# Patient Record
Sex: FEMALE | Race: WHITE | NOT HISPANIC OR LATINO | Employment: FULL TIME | URBAN - METROPOLITAN AREA
[De-identification: names, ages, dates, MRNs, and addresses within clinical notes are randomized per-mention and may not be internally consistent; named-entity substitution may affect disease eponyms.]

---

## 2017-03-07 ENCOUNTER — OFFICE VISIT (OUTPATIENT)
Dept: FAMILY MEDICINE | Facility: CLINIC | Age: 27
End: 2017-03-07
Payer: COMMERCIAL

## 2017-03-07 VITALS
HEART RATE: 64 BPM | DIASTOLIC BLOOD PRESSURE: 64 MMHG | TEMPERATURE: 98.6 F | OXYGEN SATURATION: 100 % | SYSTOLIC BLOOD PRESSURE: 118 MMHG | HEIGHT: 64 IN | RESPIRATION RATE: 16 BRPM | WEIGHT: 125 LBS | BODY MASS INDEX: 21.34 KG/M2

## 2017-03-07 DIAGNOSIS — Z00.00 LABORATORY EXAMINATION ORDERED AS PART OF A ROUTINE GENERAL MEDICAL EXAMINATION: ICD-10-CM

## 2017-03-07 DIAGNOSIS — J45.40 MODERATE PERSISTENT ASTHMA WITHOUT COMPLICATION: ICD-10-CM

## 2017-03-07 DIAGNOSIS — Z23 NEED FOR VACCINATION: ICD-10-CM

## 2017-03-07 DIAGNOSIS — Z12.4 SCREENING FOR MALIGNANT NEOPLASM OF CERVIX: ICD-10-CM

## 2017-03-07 DIAGNOSIS — Z00.00 ENCOUNTER FOR ROUTINE ADULT HEALTH EXAMINATION WITHOUT ABNORMAL FINDINGS: Primary | ICD-10-CM

## 2017-03-07 PROBLEM — Z13.6 CARDIOVASCULAR SCREENING; LDL GOAL LESS THAN 160: Status: ACTIVE | Noted: 2017-03-07

## 2017-03-07 PROBLEM — K90.49 DAIRY PRODUCT INTOLERANCE: Status: ACTIVE | Noted: 2017-03-07

## 2017-03-07 PROCEDURE — 99395 PREV VISIT EST AGE 18-39: CPT | Mod: 25 | Performed by: PHYSICIAN ASSISTANT

## 2017-03-07 PROCEDURE — 90715 TDAP VACCINE 7 YRS/> IM: CPT | Performed by: PHYSICIAN ASSISTANT

## 2017-03-07 PROCEDURE — 90471 IMMUNIZATION ADMIN: CPT | Performed by: PHYSICIAN ASSISTANT

## 2017-03-07 RX ORDER — ALBUTEROL SULFATE 90 UG/1
2 AEROSOL, METERED RESPIRATORY (INHALATION) EVERY 6 HOURS PRN
Qty: 2 INHALER | Refills: 5 | Status: SHIPPED | OUTPATIENT
Start: 2017-03-07 | End: 2020-01-09

## 2017-03-07 RX ORDER — ALBUTEROL SULFATE 90 UG/1
2 AEROSOL, METERED RESPIRATORY (INHALATION) EVERY 6 HOURS PRN
Qty: 2 INHALER | Refills: 5 | Status: SHIPPED | OUTPATIENT
Start: 2017-03-07 | End: 2017-03-07

## 2017-03-07 NOTE — PROGRESS NOTES
"Chief Complaint   Patient presents with     Physical       Initial /64  Pulse 64  Temp 98.6  F (37  C)  Resp 16  Ht 5' 4\" (1.626 m)  Wt 125 lb (56.7 kg)  LMP 02/15/2017 (Exact Date)  SpO2 100%  BMI 21.46 kg/m2 Estimated body mass index is 21.46 kg/(m^2) as calculated from the following:    Height as of this encounter: 5' 4\" (1.626 m).    Weight as of this encounter: 125 lb (56.7 kg).  Medication Reconciliation: complete. KEYLA Alfaro LPN       SUBJECTIVE:     CC: Cindi Davies is an 26 year old woman who presents for preventive health visit.     Healthy Habits:    Do you get at least three servings of calcium containing foods daily (dairy, green leafy vegetables, etc.)? yes    Amount of exercise or daily activities, outside of work: 6 day(s) per week    Problems taking medications regularly No    Medication side effects: No    Have you had an eye exam in the past two years? yes    Do you see a dentist twice per year? yes    Do you have sleep apnea, excessive snoring or daytime drowsiness?no    Pap smear done on this date: 8/2016 (approximately), by this group: [facility], results were normal.     Due for tetanus.   Asthma stable - needs refills.   See snap shot for updated social history - will be starting DNP program - needs form signed.     -------------------------------------    Today's PHQ-2 Score:   PHQ-2 ( 1999 [employer]) 3/7/2017 7/11/2014   Q1: Little interest or pleasure in doing things 0 0   Q2: Feeling down, depressed or hopeless 0 0   PHQ-2 Score 0 0       Abuse: Current or Past(Physical, Sexual or Emotional)- No  Do you feel safe in your environment - Yes    Social History   Substance Use Topics     Smoking status: Never Smoker     Smokeless tobacco: Never Used     Alcohol use Yes     The patient does not drink >3 drinks per day nor >7 drinks per week.    No results for input(s): CHOL, HDL, LDL, TRIG, CHOLHDLRATIO, NHDL in the last 08121 hours.    Reviewed orders with patient.  Reviewed health maintenance " and updated orders accordingly - Yes      KEYLA Alfaro LPN      Mammo Decision Support:  Mammogram not appropriate for this patient based on age.    Pertinent mammograms are reviewed under the imaging tab.  History of abnormal Pap smear: NO - age 21-29 PAP every 3 years recommended    Reviewed and updated as needed this visit by clinical staff  Tobacco  Allergies  Meds  Fam Hx  Soc Hx        Reviewed and updated as needed this visit by Provider            ROS:  C: NEGATIVE for fever, chills, change in weight  I: NEGATIVE for worrisome rashes, moles or lesions  E: NEGATIVE for vision changes or irritation  ENT: NEGATIVE for ear, mouth and throat problems  R: NEGATIVE for significant cough or SOB  B: NEGATIVE for masses, tenderness or discharge  CV: NEGATIVE for chest pain, palpitations or peripheral edema  GI: NEGATIVE for nausea, abdominal pain, heartburn, or change in bowel habits  : NEGATIVE for unusual urinary or vaginal symptoms. Periods are regular.  M: NEGATIVE for significant arthralgias or myalgia  N: NEGATIVE for weakness, dizziness or paresthesias  P: NEGATIVE for changes in mood or affect    Problem list, Medication list, Allergies, and Medical/Social/Surgical histories reviewed in [facility] and updated as appropriate.  Labs reviewed in EPIC  Patient Active Problem List   Diagnosis     Moderate persistent asthma without complication     CARDIOVASCULAR SCREENING; LDL GOAL LESS THAN 160     Past Surgical History   Procedure Laterality Date     No history of surgery       Lasik bilateral Bilateral 03/2016     Laura teeth         Social History   Substance Use Topics     Smoking status: Never Smoker     Smokeless tobacco: Never Used     Alcohol use Yes     Family History   Problem Relation Age of Onset     Asthma Mother      Hypothyroidism Mother      Hypertension Father      well-controlled     Colon Polyps Father      Angina Maternal Grandfather      no h/o MI     Alzheimer Disease Paternal Grandmother       "passed from kidney stone leading to sepsis     Colon Cancer Paternal Grandfather 80     did not pass from this     Pacemaker Paternal Grandfather      Heart Failure Paternal Grandfather      Colon Polyps Paternal Aunt      Breast Cancer No family hx of          Current Outpatient Prescriptions   Medication Sig Dispense Refill     albuterol (PROAIR HFA/PROVENTIL HFA/VENTOLIN HFA) 108 (90 BASE) MCG/ACT Inhaler Inhale 2 puffs into the lungs every 6 hours as needed for shortness of breath / dyspnea 2 Inhaler 5     fluticasone-salmeterol (ADVAIR DISKUS) 250-50 MCG/DOSE diskus inhaler Inhale 1 puff into the lungs 2 times daily 60 Inhaler 5     [DISCONTINUED] albuterol (PROAIR HFA/PROVENTIL HFA/VENTOLIN HFA) 108 (90 BASE) MCG/ACT Inhaler Inhale 2 puffs into the lungs every 6 hours as needed for shortness of breath / dyspnea 2 Inhaler 5     [DISCONTINUED] fluticasone-salmeterol (ADVAIR DISKUS) 250-50 MCG/DOSE diskus inhaler Inhale 1 puff into the lungs 2 times daily 60 Inhaler 5     [DISCONTINUED] ADVAIR DISKUS 250-50 MCG/DOSE diskus inhaler        [DISCONTINUED] albuterol (PROAIR HFA, PROVENTIL HFA, VENTOLIN HFA) 108 (90 BASE) MCG/ACT inhaler Inhale 2 puffs into the lungs every 6 hours as needed for shortness of breath / dyspnea 3 Inhaler 1     No Known Allergies  OBJECTIVE:     /64  Pulse 64  Temp 98.6  F (37  C)  Resp 16  Ht 5' 4\" (1.626 m)  Wt 125 lb (56.7 kg)  LMP 02/15/2017 (Exact Date)  SpO2 100%  BMI 21.46 kg/m2  EXAM:  GENERAL: healthy, alert and no distress  EYES: Eyes grossly normal to inspection, PERRL and conjunctivae and sclerae normal  HENT: ear canals and TM's normal, nose and mouth without ulcers or lesions  NECK: no adenopathy, no asymmetry, masses, or scars and thyroid normal to palpation  RESP: lungs clear to auscultation - no rales, rhonchi or wheezes  BREAST: normal without masses, tenderness or nipple discharge and no palpable axillary masses or adenopathy  CV: regular rate and rhythm, normal S1 " "S2, no S3 or S4, no murmur, click or rub, no peripheral edema and peripheral pulses strong  ABDOMEN: soft, nontender, no hepatosplenomegaly, no masses and bowel sounds normal  MS: no gross musculoskeletal defects noted, no edema  SKIN: no suspicious lesions or rashes  NEURO: Normal strength and tone, mentation intact and speech normal  PSYCH: mentation appears normal, affect normal/bright    ASSESSMENT/PLAN:     Cindi was seen today for physical.    Diagnoses and all orders for this visit:    Laboratory examination ordered as part of a routine general medical examination    Screening for malignant neoplasm of cervix  UTD. Will abstract.     Moderate persistent asthma without complication    -     albuterol (PROAIR HFA/PROVENTIL HFA/VENTOLIN HFA) 108 (90 BASE) MCG/ACT Inhaler; Inhale 2 puffs into the lungs every 6 hours as needed for shortness of breath / dyspnea  -     fluticasone-salmeterol (ADVAIR DISKUS) 250-50 MCG/DOSE diskus inhaler; Inhale 1 puff into the lungs 2 times daily    Need for vaccination  -     TDAP (ADACEL AGES 11-64) [19788.002]  -     1st  Administration  [27518]    Other orders  Not fasting today - will update next year.     COUNSELING:   Reviewed preventive health counseling, as reflected in patient instructions  Special attention given to:        Regular exercise       Healthy diet/nutrition       Vision screening       Contraception       Family planning       Safe sex practices/STD prevention         reports that she has never smoked. She has never used smokeless tobacco.    Estimated body mass index is 21.46 kg/(m^2) as calculated from the following:    Height as of this encounter: 5' 4\" (1.626 m).    Weight as of this encounter: 125 lb (56.7 kg).       Counseling Resources:  ATP IV Guidelines  Pooled Cohorts Equation Calculator  Breast Cancer Risk Calculator  FRAX Risk Assessment  ICSI Preventive Guidelines  Dietary Guidelines for Americans, 2010  USDA's MyPlate  ASA Prophylaxis  Lung CA " Screening    WILL MAIL ACT IN 6 months.     Tonya Ernst PA-C  [facility]

## 2017-03-07 NOTE — MR AVS SNAPSHOT
After Visit Summary   3/7/2017    Cindi Davies    MRN: [id number]           Patient Information     Date Of Birth          1990        Visit Information        Provider Department      3/7/2017 9:00 AM Tonya Ernst PA-C [facility]        Today's Diagnoses     Encounter for routine adult health examination without abnormal findings    -  1    Laboratory examination ordered as part of a routine general medical examination        Screening for malignant neoplasm of cervix        Moderate persistent asthma without complication        Need for vaccination          Care Instructions      Preventive Health Recommendations  Female Ages 18 to 25     Yearly exam:     See your health care provider every year in order to  o Review health changes.   o Discuss preventive care.    o Review your medicines if your doctor has prescribed any.      You should be tested each year for STDs (sexually transmitted diseases).       After age 20, talk to your provider about how often you should have cholesterol testing.      Starting at age 21, get a Pap test every three years. If you have an abnormal result, your doctor may have you test more often.      If you are at risk for diabetes, you should have a diabetes test (fasting glucose).     Shots:     Get a flu shot each year.     Get a tetanus shot every 10 years.     Consider getting the shot (vaccine) that prevents cervical cancer (Gardasil).    Nutrition:     Eat at least 5 servings of fruits and vegetables each day.    Eat whole-grain bread, whole-wheat pasta and brown rice instead of white grains and rice.    Talk to your provider about Calcium and Vitamin D.     Lifestyle    Exercise at least 150 minutes a week each week (30 minutes a day, 5 days a week). This will help you control your weight and prevent disease.    Limit alcohol to one drink per day.    No smoking.     Wear sunscreen to prevent skin cancer.    See your dentist  "every six months for an exam and cleaning.        Follow-ups after your visit        Follow-up notes from your care team     Return in about 1 year (around 3/7/2018), or if symptoms worsen or fail to improve, for Physical Exam.      Who to contact     If you have questions or need follow up information about today's clinic visit or your schedule please contact [facility] [place] directly at [phone].  Normal or non-critical lab and imaging results will be communicated to you by [employer], letter or phone within 4 business days after the clinic has received the results. If you do not hear from us within 7 days, please contact the clinic through [employer] or phone. If you have a critical or abnormal lab result, we will notify you by phone as soon as possible.  Submit refill requests through [facility] or call your pharmacy and they will forward the refill request to us. Please allow 3 business days for your refill to be completed.          Additional Information About Your Visit        [employer]ex[employer]ar[employer] Information     [facility] lets you send messages to your doctor, view your test results, renew your prescriptions, schedule appointments and more. To sign up, go to [email or web address] . Click on \"Log in\" on the left side of the screen, which will take you to the Welcome page. Then click on \"Sign up Now\" on the right side of the page.     You will be asked to enter the access code listed below, as well as some personal information. Please follow the directions to create your username and password.     Your access code is: [email or web address]  Expires: 2017 10:34 AM     Your access code will  in 90 days. If you need help or a new code, please call your [place] clinic or [phone].        Care EveryWhere ID     This is your Care EveryWhere ID. This could be used by other organizations to access your [place] medical records  [id number]        Your Vitals Were     Pulse Temperature Respirations Height Last Period " "Pulse Oximetry    64 98.6  F (37  C) 16 5' 4\" (1.626 m) 02/15/2017 (Exact Date) 100%    BMI (Body Mass Index)                   21.46 kg/m2            Blood Pressure from Last 3 Encounters:   03/07/17 118/64   07/10/14 112/62   08/04/13 120/82    Weight from Last 3 Encounters:   03/07/17 125 lb (56.7 kg)   07/10/14 127 lb (57.6 kg)              We Performed the Following     1st  Administration  [33344]     TDAP (ADACEL AGES 11-64) [38227.002]          Today's Medication Changes          These changes are accurate as of: 3/7/17 10:34 AM.  If you have any questions, ask your nurse or doctor.               Start taking these medicines.        Dose/Directions    albuterol 108 (90 BASE) MCG/ACT Inhaler   Commonly known as:  PROAIR HFA/PROVENTIL HFA/VENTOLIN HFA   Used for:  Moderate persistent asthma without complication   Started by:  Tonya Ernst PA-C        Dose:  2 puff   Inhale 2 puffs into the lungs every 6 hours as needed for shortness of breath / dyspnea   Quantity:  2 Inhaler   Refills:  5       fluticasone-salmeterol 250-50 MCG/DOSE diskus inhaler   Commonly known as:  ADVAIR DISKUS   Used for:  Moderate persistent asthma without complication   Started by:  Tonya Ernst PA-C        Dose:  1 puff   Inhale 1 puff into the lungs 2 times daily   Quantity:  60 Inhaler   Refills:  5         Stop taking these medicines if you haven't already. Please contact your care team if you have questions.     ipratropium - albuterol 0.5 mg/2.5 mg/3 mL 0.5-2.5 (3) MG/3ML neb solution   Commonly known as:  DUONEB   Stopped by:  Tonya Ernst PA-C           predniSONE 20 MG tablet   Commonly known as:  DELTASONE   Stopped by:  Tonya Ernst PA-C                Where to get your medicines      These medications were sent to [facility] [place] [place], [address] [place] [address]     Phone:  [phone]     " albuterol 108 (90 BASE) MCG/ACT Inhaler    fluticasone-salmeterol 250-50 MCG/DOSE diskus inhaler                Primary Care Provider Office Phone # Fax #    Tonya Ernst PA-C [phone]       [facility] [place] [address] [place] [address] [address]  [place] [address]        Thank you!     Thank you for choosing [facility]  for your care. Our goal is always to provide you with excellent care. Hearing back from our patients is one way we can continue to improve our services. Please take a few minutes to complete the written survey that you may receive in the mail after your visit with us. Thank you!             Your Updated Medication List - Protect others around you: Learn how to safely use, store and throw away your medicines at www.disposemymeds.org.          This list is accurate as of: 3/7/17 10:34 AM.  Always use your most recent med list.                   Brand Name Dispense Instructions for use    albuterol 108 (90 BASE) MCG/ACT Inhaler    PROAIR HFA/PROVENTIL HFA/VENTOLIN HFA    2 Inhaler    Inhale 2 puffs into the lungs every 6 hours as needed for shortness of breath / dyspnea       fluticasone-salmeterol 250-50 MCG/DOSE diskus inhaler    ADVAIR DISKUS    60 Inhaler    Inhale 1 puff into the lungs 2 times daily

## 2017-03-08 ASSESSMENT — ASTHMA QUESTIONNAIRES: ACT_TOTALSCORE: 22

## 2018-02-25 ENCOUNTER — APPOINTMENT (OUTPATIENT)
Dept: GENERAL RADIOLOGY | Facility: CLINIC | Age: 28
End: 2018-02-25
Attending: EMERGENCY MEDICINE
Payer: COMMERCIAL

## 2018-02-25 ENCOUNTER — NURSE TRIAGE (OUTPATIENT)
Dept: NURSING | Facility: CLINIC | Age: 28
End: 2018-02-25

## 2018-02-25 ENCOUNTER — HOSPITAL ENCOUNTER (EMERGENCY)
Facility: CLINIC | Age: 28
Discharge: HOME | End: 2018-02-25
Attending: EMERGENCY MEDICINE | Admitting: EMERGENCY MEDICINE
Payer: COMMERCIAL

## 2018-02-25 VITALS
RESPIRATION RATE: 16 BRPM | TEMPERATURE: 97.8 F | OXYGEN SATURATION: 100 % | DIASTOLIC BLOOD PRESSURE: 66 MMHG | SYSTOLIC BLOOD PRESSURE: 106 MMHG

## 2018-02-25 DIAGNOSIS — R07.9 ACUTE CHEST PAIN: ICD-10-CM

## 2018-02-25 LAB
ANION GAP SERPL CALCULATED.3IONS-SCNC: 7 MMOL/L (ref 3–14)
BASOPHILS # BLD AUTO: 0 10E9/L (ref 0–0.2)
BASOPHILS NFR BLD AUTO: 0.5 %
BUN SERPL-MCNC: 18 MG/DL (ref 7–30)
CALCIUM SERPL-MCNC: 8.5 MG/DL (ref 8.5–10.1)
CHLORIDE SERPL-SCNC: 108 MMOL/L (ref 94–109)
CO2 SERPL-SCNC: 26 MMOL/L (ref 20–32)
CREAT SERPL-MCNC: 1.02 MG/DL (ref 0.52–1.04)
D DIMER PPP FEU-MCNC: 0.3 UG/ML FEU (ref 0–0.5)
DIFFERENTIAL METHOD BLD: NORMAL
EOSINOPHIL # BLD AUTO: 0.5 10E9/L (ref 0–0.7)
EOSINOPHIL NFR BLD AUTO: 5.9 %
ERYTHROCYTE [DISTWIDTH] IN BLOOD BY AUTOMATED COUNT: 12.3 % (ref 10–15)
GFR SERPL CREATININE-BSD FRML MDRD: 65 ML/MIN/1.7M2
GLUCOSE SERPL-MCNC: 84 MG/DL (ref 70–99)
HCT VFR BLD AUTO: 39 % (ref 35–47)
HGB BLD-MCNC: 13.9 G/DL (ref 11.7–15.7)
IMM GRANULOCYTES # BLD: 0 10E9/L (ref 0–0.4)
IMM GRANULOCYTES NFR BLD: 0.1 %
LYMPHOCYTES # BLD AUTO: 2 10E9/L (ref 0.8–5.3)
LYMPHOCYTES NFR BLD AUTO: 26 %
MCH RBC QN AUTO: 32.7 PG (ref 26.5–33)
MCHC RBC AUTO-ENTMCNC: 35.6 G/DL (ref 31.5–36.5)
MCV RBC AUTO: 92 FL (ref 78–100)
MONOCYTES # BLD AUTO: 0.7 10E9/L (ref 0–1.3)
MONOCYTES NFR BLD AUTO: 8.4 %
NEUTROPHILS # BLD AUTO: 4.6 10E9/L (ref 1.6–8.3)
NEUTROPHILS NFR BLD AUTO: 59.1 %
NRBC # BLD AUTO: 0 10*3/UL
NRBC BLD AUTO-RTO: 0 /100
PLATELET # BLD AUTO: 301 10E9/L (ref 150–450)
POTASSIUM SERPL-SCNC: 3.9 MMOL/L (ref 3.4–5.3)
RBC # BLD AUTO: 4.25 10E12/L (ref 3.8–5.2)
SODIUM SERPL-SCNC: 141 MMOL/L (ref 133–144)
TROPONIN I SERPL-MCNC: <0.015 UG/L (ref 0–0.04)
WBC # BLD AUTO: 7.7 10E9/L (ref 4–11)

## 2018-02-25 PROCEDURE — 93005 ELECTROCARDIOGRAM TRACING: CPT

## 2018-02-25 PROCEDURE — 71046 X-RAY EXAM CHEST 2 VIEWS: CPT

## 2018-02-25 PROCEDURE — 80048 BASIC METABOLIC PNL TOTAL CA: CPT | Performed by: EMERGENCY MEDICINE

## 2018-02-25 PROCEDURE — 85379 FIBRIN DEGRADATION QUANT: CPT | Performed by: EMERGENCY MEDICINE

## 2018-02-25 PROCEDURE — 84484 ASSAY OF TROPONIN QUANT: CPT | Performed by: EMERGENCY MEDICINE

## 2018-02-25 PROCEDURE — 25000128 H RX IP 250 OP 636: Performed by: EMERGENCY MEDICINE

## 2018-02-25 PROCEDURE — 99285 EMERGENCY DEPT VISIT HI MDM: CPT | Mod: 25

## 2018-02-25 PROCEDURE — 85025 COMPLETE CBC W/AUTO DIFF WBC: CPT | Performed by: EMERGENCY MEDICINE

## 2018-02-25 PROCEDURE — 96360 HYDRATION IV INFUSION INIT: CPT

## 2018-02-25 RX ORDER — SODIUM CHLORIDE 9 MG/ML
1000 INJECTION, SOLUTION INTRAVENOUS CONTINUOUS
Status: DISCONTINUED | OUTPATIENT
Start: 2018-02-25 | End: 2018-02-25 | Stop reason: HOSPADM

## 2018-02-25 RX ORDER — KETOROLAC TROMETHAMINE 30 MG/ML
30 INJECTION, SOLUTION INTRAMUSCULAR; INTRAVENOUS ONCE
Status: DISCONTINUED | OUTPATIENT
Start: 2018-02-25 | End: 2018-02-25 | Stop reason: HOSPADM

## 2018-02-25 RX ADMIN — SODIUM CHLORIDE 1000 ML: 9 INJECTION, SOLUTION INTRAVENOUS at 21:02

## 2018-02-25 RX ADMIN — SODIUM CHLORIDE 1000 ML: 9 INJECTION, SOLUTION INTRAVENOUS at 20:27

## 2018-02-25 ASSESSMENT — ENCOUNTER SYMPTOMS: DIZZINESS: 1

## 2018-02-25 NOTE — ED AVS SNAPSHOT
Emergency Department    [address] [place] [address]    Phone:  [phone]    Fax:  [phone]                                       Cindi Padilla   MRN: [id number]    Department:   Emergency Department   Date of Visit:  2/25/2018           After Visit Summary Signature Page     I have received my discharge instructions, and my questions have been answered. I have discussed any challenges I see with this plan with the nurse or doctor.    ..........................................................................................................................................  Patient/Patient Representative Signature      ..........................................................................................................................................  Patient Representative Print Name and Relationship to Patient    ..................................................               ................................................  Date                                            Time    ..........................................................................................................................................  Reviewed by Signature/Title    ...................................................              ..............................................  Date                                                            Time

## 2018-02-25 NOTE — ED AVS SNAPSHOT
Emergency Department    [address] [place] [id number]    Phone:  [phone]    Fax:  [phone]                                       Cindi Padilla   MRN: [id number]    Department:   Emergency Department   Date of Visit:  2/25/2018           Patient Information     Date Of Birth          1990        Your diagnoses for this visit were:     Acute chest pain        You were seen by Robert Shepherd MD.      Follow-up Information     Follow up with Tonya Ernst PA-C.    Specialty:  Physician Assistant        Discharge Instructions           Chest Wall Pain: Costochondritis    The chest pain that you have had today is caused by costochondritis. This condition is caused by an inflammation of the cartilage joining your ribs to your breastbone. It is not caused by heart or lung problems. Your healthcare team has made sure that the chest pain you feel is not from a life threatening cause of chest pain such as heart attack, collapsed lung, blood clot in the lung, tear in the aorta, or esophageal rupture. The inflammation may have been brought on by a blow to the chest, lifting heavy objects, intense exercise, or an illness that made you cough and sneeze a lot. It often occurs during times of emotional stress. It can be painful, but it is not dangerous. It usually goes away in 1 to 2 weeks. But it may happen again. Rarely, a more serious condition may cause symptoms similar to costochondritis. That s why it s important to watch for the warning signs listed below.  Home care  Follow these guidelines when caring for yourself at home:    If you feel that emotional stress is a cause of your condition, try to figure out the sources of that stress. It may not be obvious. Learn ways to deal with the stress in your life. This can include regular exercise, muscle relaxation, meditation, or simply taking time out for yourself.    You may use acetaminophen, ibuprofen, or naproxen to control  pain, unless another pain medicine was prescribed. If you have liver or kidney disease or ever had a stomach ulcer, talk with your healthcare provider before using these medicines.    You can also help ease pain by using a hot, wet compress or heating pad. Use this with or without a medicated skin cream that helps relieves pain.    Do stretching exercise as advised by your provider.    Take any prescribed medicines as directed.  Follow-up care  Follow up with your healthcare provider, or as advised, if you do not start to get better in the next 2 days.  When to seek medical advice  Call your healthcare provider right away if any of these occur:    A change in the type of pain. Call if it feels different, becomes more serious, lasts longer, or spreads into your shoulder, arm, neck, jaw, or back.    Shortness of breath or pain gets worse when you breathe    Weakness, dizziness, or fainting    Cough with dark-colored sputum (phlegm) or blood    Abdominal pain    Dark red or black stools    Fever of 100.4 F (38 C) or higher, or as directed by your healthcare provider  Date Last Reviewed: 12/1/2016 2000-2017 [employer]. [address] [place] [address]. All rights reserved. This information is not intended as a substitute for professional medical care. Always follow your healthcare professional's instructions.      Discharge Instructions  Chest Pain    You have been seen today for chest pain or discomfort.  At this time, your doctor has found no signs that your chest pain is due to a serious or life-threatening condition, (or you have declined more testing and/or admission to the hospital). However, sometimes there is a serious problem that does not show up right away. Your evaluation today may not be complete and you may need further testing and evaluation.     You need to follow-up with your regular doctor within 3 days.    Return to the Emergency Department if:    Your chest pain changes,  gets worse, starts to happen more often, or comes with less activity.    You are short of breath.    You get very weak or tired.    You pass out or faint.    You have any new symptoms, like fever, cough, numb legs, or you cough up blood.    You have anything else that worries you.    Until you follow-up with your regular doctor please do the following:    Take one aspirin daily unless you have an allergy or are told not to by your doctor.    If a stress test appointment has been made, go to the appointment.    If you have questions, contact your regular doctor.    If your doctor today has told you to follow-up with your regular doctor, it is very important that you make an appointment with your clinic and go to the appointment.  If you do not follow-up with your primary doctor, it may result in missing an important development which could result in permanent injury or disability and/or lasting pain.  If there is any problem keeping your appointment, call your doctor or return to the Emergency Department.    If you were given a prescription for medicine here today, be sure to read all of the information (including the package insert) that comes with your prescription.  This will include important information about the medicine, its side effects, and any warnings that you need to know about.  The pharmacist who fills the prescription can provide more information and answer questions you may have about the medicine.  If you have questions or concerns that the pharmacist cannot address, please call or return to the Emergency Department.     Opioid Medication Information    Pain medications are among the most commonly prescribed medicines, so we are including this information for all our patients. If you did not receive pain medication or get a prescription for pain medicine, you can ignore it.     You may have been given a prescription for an opioid (narcotic) pain medicine and/or have received a pain medicine while  here in the Emergency Department. These medicines can make you drowsy or impaired. You must not drive, operate dangerous equipment, or engage in any other dangerous activities while taking these medications. If you drive while taking these medications, you could be arrested for DUI, or driving under the influence. Do not drink any alcohol while you are taking these medications.     Opioid pain medications can cause addiction. If you have a history of chemical dependency of any type, you are at a higher risk of becoming addicted to pain medications.  Only take these prescribed medications to treat your pain when all other options have been tried. Take it for as short a time and as few doses as possible. Store your pain pills in a secure place, as they are frequently stolen and provide a dangerous opportunity for children or visitors in your house to start abusing these powerful medications. We will not replace any lost or stolen medicine.  As soon as your pain is better, you should flush all your remaining medication.     Many prescription pain medications contain Tylenol  (acetaminophen), including Vicodin , Tylenol #3 , Norco , Lortab , and Percocet .  You should not take any extra pills of Tylenol  if you are using these prescription medications or you can get very sick.  Do not ever take more than 3000 mg of acetaminophen in any 24 hour period.    All opioids tend to cause constipation. Drink plenty of water and eat foods that have a lot of fiber, such as fruits, vegetables, prune juice, apple juice and high fiber cereal.  Take a laxative if you don t move your bowels at least every other day. Miralax , Milk of Magnesia, Colace , or Senna  can be used to keep you regular.      Remember that you can always come back to the Emergency Department if you are not able to see your regular doctor in the amount of time listed above, if you get any new symptoms, or if there is anything that worries you.          24 Hour  Appointment Hotline       To make an appointment at any [facility], call [phone] ([phone]). If you don't have a family doctor or clinic, we will help you find one. [facility] are conveniently located to serve the needs of you and your family.             Review of your medicines      Our records show that you are taking the medicines listed below. If these are incorrect, please call your family doctor or clinic.        Dose / Directions Last dose taken    albuterol 108 (90 BASE) MCG/ACT Inhaler   Commonly known as:  PROAIR HFA/PROVENTIL HFA/VENTOLIN HFA   Dose:  2 puff   Quantity:  2 Inhaler        Inhale 2 puffs into the lungs every 6 hours as needed for shortness of breath / dyspnea   Refills:  5        fluticasone-salmeterol 250-50 MCG/DOSE diskus inhaler   Commonly known as:  ADVAIR DISKUS   Dose:  1 puff   Quantity:  60 Inhaler        Inhale 1 puff into the lungs 2 times daily   Refills:  5                Procedures and tests performed during your visit     Basic metabolic panel    CBC with platelets differential    Cardiac Continuous Monitoring    D dimer quantitative    EKG 12-lead, tracing only    Peripheral IV: Standard    Pulse oximetry nursing    Troponin I    XR Chest 2 Views      Orders Needing Specimen Collection     None      Pending Results     Date and Time Order Name Status Description    2/25/2018 1956 EKG 12-lead, tracing only Preliminary             Pending Culture Results     No orders found from 2/23/2018 to 2/26/2018.            Pending Results Instructions     If you had any lab results that were not finalized at the time of your Discharge, you can call the ED Lab Result RN at [phone]. You will be contacted by this team for any positive Lab results or changes in treatment. The nurses are available 7 days a week from 10A to 6:30P.  You can leave a message 24 hours per day and they will return your call.        Test Results From Your Hospital Stay        2/25/2018   8:30 PM      Component Results     Component Value Ref Range & Units Status    WBC 7.7 4.0 - 11.0 10e9/L Final    RBC Count 4.25 3.8 - 5.2 10e12/L Final    Hemoglobin 13.9 11.7 - 15.7 g/dL Final    Hematocrit 39.0 35.0 - 47.0 % Final    MCV 92 78 - 100 fl Final    MCH 32.7 26.5 - 33.0 pg Final    MCHC 35.6 31.5 - 36.5 g/dL Final    RDW 12.3 10.0 - 15.0 % Final    Platelet Count 301 150 - 450 10e9/L Final    Diff Method Automated Method  Final    % Neutrophils 59.1 % Final    % Lymphocytes 26.0 % Final    % Monocytes 8.4 % Final    % Eosinophils 5.9 % Final    % Basophils 0.5 % Final    % Immature Granulocytes 0.1 % Final    Nucleated RBCs 0 0 /100 Final    Absolute Neutrophil 4.6 1.6 - 8.3 10e9/L Final    Absolute Lymphocytes 2.0 0.8 - 5.3 10e9/L Final    Absolute Monocytes 0.7 0.0 - 1.3 10e9/L Final    Absolute Eosinophils 0.5 0.0 - 0.7 10e9/L Final    Absolute Basophils 0.0 0.0 - 0.2 10e9/L Final    Abs Immature Granulocytes 0.0 0 - 0.4 10e9/L Final    Absolute Nucleated RBC 0.0  Final         2/25/2018  8:48 PM      Component Results     Component Value Ref Range & Units Status    Sodium 141 133 - 144 mmol/L Final    Potassium 3.9 3.4 - 5.3 mmol/L Final    Chloride 108 94 - 109 mmol/L Final    Carbon Dioxide 26 20 - 32 mmol/L Final    Anion Gap 7 3 - 14 mmol/L Final    Glucose 84 70 - 99 mg/dL Final    Urea Nitrogen 18 7 - 30 mg/dL Final    Creatinine 1.02 0.52 - 1.04 mg/dL Final    GFR Estimate 65 >60 mL/min/1.7m2 Final    Non  GFR Calc    GFR Estimate If Black 79 >60 mL/min/1.7m2 Final    African American GFR Calc    Calcium 8.5 8.5 - 10.1 mg/dL Final         2/25/2018  8:52 PM      Component Results     Component Value Ref Range & Units Status    Troponin I ES <0.015 0.000 - 0.045 ug/L Final    The 99th percentile for upper reference range is 0.045 ug/L.  Troponin values   in the range of 0.045 - 0.120 ug/L may be associated with risks of adverse   clinical events.           2/25/2018  8:46 PM       Component Results     Component Value Ref Range & Units Status    D Dimer 0.3 0.0 - 0.50 ug/ml FEU Final    This D-dimer assay is intended for use in conjunction with a clinical pretest   probability assessment model to exclude pulmonary embolism (PE) and deep   venous thrombosis (DVT) in outpatients suspected of PE or DVT. The cut-off   value is 0.5 ug/mL FEU.           2/25/2018  8:24 PM      Narrative     XR CHEST 2 VW 2/25/2018 8:18 PM     HISTORY: Chest pain;      COMPARISON: None available        Impression     IMPRESSION:  No acute pulmonary disease.    ROBERT ORNELAS MD                Clinical Quality Measure: Blood Pressure Screening     Your blood pressure was checked while you were in the emergency department today. The last reading we obtained was  BP: 106/66 . Please read the guidelines below about what these numbers mean and what you should do about them.  If your systolic blood pressure (the top number) is less than 120 and your diastolic blood pressure (the bottom number) is less than 80, then your blood pressure is normal. There is nothing more that you need to do about it.  If your systolic blood pressure (the top number) is 120-139 or your diastolic blood pressure (the bottom number) is 80-89, your blood pressure may be higher than it should be. You should have your blood pressure rechecked within a year by a primary care provider.  If your systolic blood pressure (the top number) is 140 or greater or your diastolic blood pressure (the bottom number) is 90 or greater, you may have high blood pressure. High blood pressure is treatable, but if left untreated over time it can put you at risk for heart attack, stroke, or kidney failure. You should have your blood pressure rechecked by a primary care provider within the next 4 weeks.  If your provider in the emergency department today gave you specific instructions to follow-up with your doctor or provider even sooner than that, you should follow  "that instruction and not wait for up to 4 weeks for your follow-up visit.        Thank you for choosing [facility]       Thank you for choosing [facility] for your care. Our goal is always to provide you with excellent care. Hearing back from our patients is one way we can continue to improve our services. Please take a few minutes to complete the written survey that you may receive in the mail after you visit with us. Thank you!        [employer] Information     [employer] lets you send messages to your doctor, view your test results, renew your prescriptions, schedule appointments and more. To sign up, go to [email or web address] . Click on \"Log in\" on the left side of the screen, which will take you to the Welcome page. Then click on \"Sign up Now\" on the right side of the page.     You will be asked to enter the access code listed below, as well as some personal information. Please follow the directions to create your username and password.     Your access code is: [email or web address]  Expires: 2018  9:26 PM     Your access code will  in 90 days. If you need help or a new code, please call your [facility] clinic or [phone].        Care EveryWhere ID     This is your Care EveryWhere ID. This could be used by other organizations to access your [facility] medical records  [id number]        Equal Access to Services     MARILOU FATIMA : David Ramirez, waaxda luqadaha, qaybta kaalmada gustavo, nessa bravo . So [facility] [phone].    ATENCIÓN: Si habla español, tiene a davidson disposición servicios gratuitos de asistencia lingüística. Llame al [phone].    We comply with applicable federal civil rights laws and [place] laws. We do not discriminate on the basis of race, color, national origin, age, disability, sex, sexual orientation, or gender identity.            After Visit Summary       This is your record. Keep this with you and show to your community pharmacist(s) and " doctor(s) at your next visit.

## 2018-02-26 LAB — INTERPRETATION ECG - MUSE: NORMAL

## 2018-02-26 NOTE — DISCHARGE INSTRUCTIONS
Chest Wall Pain: Costochondritis    The chest pain that you have had today is caused by costochondritis. This condition is caused by an inflammation of the cartilage joining your ribs to your breastbone. It is not caused by heart or lung problems. Your healthcare team has made sure that the chest pain you feel is not from a life threatening cause of chest pain such as heart attack, collapsed lung, blood clot in the lung, tear in the aorta, or esophageal rupture. The inflammation may have been brought on by a blow to the chest, lifting heavy objects, intense exercise, or an illness that made you cough and sneeze a lot. It often occurs during times of emotional stress. It can be painful, but it is not dangerous. It usually goes away in 1 to 2 weeks. But it may happen again. Rarely, a more serious condition may cause symptoms similar to costochondritis. That s why it s important to watch for the warning signs listed below.  Home care  Follow these guidelines when caring for yourself at home:    If you feel that emotional stress is a cause of your condition, try to figure out the sources of that stress. It may not be obvious. Learn ways to deal with the stress in your life. This can include regular exercise, muscle relaxation, meditation, or simply taking time out for yourself.    You may use acetaminophen, ibuprofen, or naproxen to control pain, unless another pain medicine was prescribed. If you have liver or kidney disease or ever had a stomach ulcer, talk with your healthcare provider before using these medicines.    You can also help ease pain by using a hot, wet compress or heating pad. Use this with or without a medicated skin cream that helps relieves pain.    Do stretching exercise as advised by your provider.    Take any prescribed medicines as directed.  Follow-up care  Follow up with your healthcare provider, or as advised, if you do not start to get better in the next 2 days.  When to seek medical  advice  Call your healthcare provider right away if any of these occur:    A change in the type of pain. Call if it feels different, becomes more serious, lasts longer, or spreads into your shoulder, arm, neck, jaw, or back.    Shortness of breath or pain gets worse when you breathe    Weakness, dizziness, or fainting    Cough with dark-colored sputum (phlegm) or blood    Abdominal pain    Dark red or black stools    Fever of 100.4 F (38 C) or higher, or as directed by your healthcare provider  Date Last Reviewed: 12/1/2016 2000-2017 [employer]. [address] [place] [address]. All rights reserved. This information is not intended as a substitute for professional medical care. Always follow your healthcare professional's instructions.      Discharge Instructions  Chest Pain    You have been seen today for chest pain or discomfort.  At this time, your doctor has found no signs that your chest pain is due to a serious or life-threatening condition, (or you have declined more testing and/or admission to the hospital). However, sometimes there is a serious problem that does not show up right away. Your evaluation today may not be complete and you may need further testing and evaluation.     You need to follow-up with your regular doctor within 3 days.    Return to the Emergency Department if:    Your chest pain changes, gets worse, starts to happen more often, or comes with less activity.    You are short of breath.    You get very weak or tired.    You pass out or faint.    You have any new symptoms, like fever, cough, numb legs, or you cough up blood.    You have anything else that worries you.    Until you follow-up with your regular doctor please do the following:    Take one aspirin daily unless you have an allergy or are told not to by your doctor.    If a stress test appointment has been made, go to the appointment.    If you have questions, contact your regular doctor.    If your  doctor today has told you to follow-up with your regular doctor, it is very important that you make an appointment with your clinic and go to the appointment.  If you do not follow-up with your primary doctor, it may result in missing an important development which could result in permanent injury or disability and/or lasting pain.  If there is any problem keeping your appointment, call your doctor or return to the Emergency Department.    If you were given a prescription for medicine here today, be sure to read all of the information (including the package insert) that comes with your prescription.  This will include important information about the medicine, its side effects, and any warnings that you need to know about.  The pharmacist who fills the prescription can provide more information and answer questions you may have about the medicine.  If you have questions or concerns that the pharmacist cannot address, please call or return to the Emergency Department.     Opioid Medication Information    Pain medications are among the most commonly prescribed medicines, so we are including this information for all our patients. If you did not receive pain medication or get a prescription for pain medicine, you can ignore it.     You may have been given a prescription for an opioid (narcotic) pain medicine and/or have received a pain medicine while here in the Emergency Department. These medicines can make you drowsy or impaired. You must not drive, operate dangerous equipment, or engage in any other dangerous activities while taking these medications. If you drive while taking these medications, you could be arrested for DUI, or driving under the influence. Do not drink any alcohol while you are taking these medications.     Opioid pain medications can cause addiction. If you have a history of chemical dependency of any type, you are at a higher risk of becoming addicted to pain medications.  Only take these prescribed  medications to treat your pain when all other options have been tried. Take it for as short a time and as few doses as possible. Store your pain pills in a secure place, as they are frequently stolen and provide a dangerous opportunity for children or visitors in your house to start abusing these powerful medications. We will not replace any lost or stolen medicine.  As soon as your pain is better, you should flush all your remaining medication.     Many prescription pain medications contain Tylenol  (acetaminophen), including Vicodin , Tylenol #3 , Norco , Lortab , and Percocet .  You should not take any extra pills of Tylenol  if you are using these prescription medications or you can get very sick.  Do not ever take more than 3000 mg of acetaminophen in any 24 hour period.    All opioids tend to cause constipation. Drink plenty of water and eat foods that have a lot of fiber, such as fruits, vegetables, prune juice, apple juice and high fiber cereal.  Take a laxative if you don t move your bowels at least every other day. Miralax , Milk of Magnesia, Colace , or Senna  can be used to keep you regular.      Remember that you can always come back to the Emergency Department if you are not able to see your regular doctor in the amount of time listed above, if you get any new symptoms, or if there is anything that worries you.

## 2018-02-26 NOTE — TELEPHONE ENCOUNTER
"  Reason for Disposition    Difficulty breathing    Additional Information    Negative: Chest pain    Negative: Rectal bleeding, bloody stool, or tarry-black stool    Negative: [1] Vomiting AND [2] contains red blood or black (\"coffee ground\") material    Negative: Vomiting is main symptom    Negative: Diarrhea is main symptom    Negative: Headache is main symptom    Negative: Patient states that he/she is having an anxiety/panic attack    Negative: Dizziness from low blood sugar (i.e., < 60 mg/dl or 3.5 mmol/l)    Negative: Dizziness is described as a spinning sensation (i.e., vertigo)    Negative: Heat exhaustion suspected    Negative: Severe difficulty breathing (e.g., struggling for each breath, speaks in single words)    Negative: [1] Difficulty breathing or swallowing AND [2] started suddenly after medicine, an allergic food or bee sting    Negative: Shock suspected (e.g., cold/pale/clammy skin, too weak to stand, low BP, rapid pulse)    Negative: Difficult to awaken or acting confused  (e.g., disoriented, slurred speech)    Negative: [1] Weakness (i.e., paralysis, loss of muscle strength) of the face, arm or leg on one side of the body AND [2] sudden onset AND [3] present now    Negative: [1] Numbness (i.e., loss of sensation) of the face, arm or leg on one side of the body AND [2] sudden onset AND [3] present now    Negative: [1] Loss of speech or garbled speech AND [2] sudden onset AND [3] present now    Negative: Overdose (accidental or intentional) of medications    Negative: [1] Fainted > 15 minutes ago AND [2] still feels too weak or dizzy to stand    Negative: Heart beating < 50 beats per minute OR > 140 beats per minute    Negative: Sounds like a life-threatening emergency to the triager    Answer Assessment - Initial Assessment Questions  1. DESCRIPTION: \"Describe your dizziness.\"      Lightheaded.  Pt reports one hour ago she felt a stabbing pain in her chest and felt light it was difficult to " "breath, she reports this is better now she is talking in sentences.  She also then started to feel dizzy and had some tingling in her legs and hands, no numbness.  She is alert and oriented.  2. LIGHTHEADED: \"Do you feel lightheaded?\" (e.g., somewhat faint, woozy, weak upon standing)      Worse when sitting  3. VERTIGO: \"Do you feel like either you or the room is spinning or tilting?\" (i.e. vertigo)      no  4. SEVERITY: \"How bad is it?\"  \"Do you feel like you are going to faint?\" \"Can you stand and walk?\"    - MILD - walking normally    - MODERATE - interferes with normal activities (e.g., work, school)     - SEVERE - unable to stand, requires support to walk, feels like passing out now.       mild  5. ONSET:  \"When did the dizziness begin?\"      One hour ago  6. AGGRAVATING FACTORS: \"Does anything make it worse?\" (e.g., standing, change in head position)        7. HEART RATE: \"Can you tell me your heart rate?\" \"How many beats in 15 seconds?\"  (Note: not all patients can do this)       no  8. CAUSE: \"What do you think is causing the dizziness?\"      unknown  9. RECURRENT SYMPTOM: \"Have you had dizziness before?\" If so, ask: \"When was the last time?\" \"What happened that time?\"      yes  10. OTHER SYMPTOMS: \"Do you have any other symptoms?\" (e.g., fever, chest pain, vomiting, diarrhea, bleeding)        Chest pain that was stabbing  11. PREGNANCY: \"Is there any chance you are pregnant?\" \"When was your last menstrual period?\"        She has period now    Protocols used: DIZZINESS - LIGHTHEADEDNESS-ADULT-AH    "

## 2018-02-26 NOTE — ED PROVIDER NOTES
History     Chief Complaint:  Chest Pain    HPI   Cindi Padilla is a 27 year old female with a history of asthma who presents to the emergency department today for evaluation of chest pain. The patient reports she is very active and runs about 6-7 miles per day. Today, she was down hill skiing earlier today for several hours. When she returned home at 1730, she was playing with her dog when she began to feel a stabbing left sided chest pain which lasted about 30 seconds. She has been feeling intermittent dizziness and a dull discomfort, prompting her to present to the emergency department. She denies chest pain with exertion.     Cardiac/PE/DVT Risk Factors:  Family history of heart complications     Allergies:  No Known Drug Allergies    Medications:    The patient is currently on no regular medications.    Past Medical History:    Asthma    Past Surgical History:    Lasik bilateral    Family History:    Asthma  Hypertension  Heart Failure  Angina    Social History:  The patient was accompanied to the ED by a friend.  Smoking Status: never  Smokeless Tobacco: never  Alcohol Use: yes  Marital Status:  Single      Review of Systems   Cardiovascular: Positive for chest pain.   Neurological: Positive for dizziness.   All other systems reviewed and are negative.    Physical Exam   First Vitals: BP (!) 138/100  Resp 16  SpO2 98%    Physical Exam  GENERAL: well developed, pleasant  HEAD: atraumatic  EYES: pupils reactive, extraocular muscles intact, conjunctivae normal  ENT:  mucus membranes moist  NECK:  trachea midline, normal range of motion  RESPIRATORY: no tachypnea, breath sounds clear to auscultation   CVS: normal S1/S2, no murmurs, intact distal pulses  ABDOMEN: soft, nontender, nondistention  MUSCULOSKELETAL: no deformities  SKIN: warm and dry, no acute rashes or ulceration  NEURO: GCS 15, cranial nerves intact, alert and oriented x3  PSYCH:  Mood/affect normal    Emergency Department Course      ECG:  Indication: Chest Pain  Completed at 2003.  Read at 2006.   Normal sinus rhythm   Incomplete right bundle branch block  Borderline ECG  Rate 80 bpm. SD interval 162. QRS duration 98. QT/QTc 406/468. P-R-T axes 44 80 42.    Imaging:  Radiology findings were communicated with the patient who voiced understanding of the findings.    XR Chest 2 Views  IMPRESSION:  No acute pulmonary disease.  Report per radiology     Laboratory:  Laboratory findings were communicated with the patient who voiced understanding of the findings.  CBC: WBC 7.7, HGB 13.9,   CMP: Creatinine 1.02  Troponin: <0.015  D dimer: 0.3    Interventions:  2027 NS Bolus 1,000mL IV  2102 NS Bolus 1,000 mL IV    Emergency Department Course:  Nursing notes and vitals reviewed.  I performed an exam of the patient as documented above.   IV was inserted and blood was drawn for laboratory testing, results above.  The patient was sent for a XR Chest 2 Views while in the emergency department, results above.     2153 Patient rechecked and updated.     I personally reviewed the laboratory and imaging results with the Patient and answered all related questions prior to discharge.  Findings and plan explained to the Patient. Patient discharged home with instructions regarding supportive care, medications, and reasons to return. The importance of close follow-up was reviewed.     Impression & Plan      Medical Decision Making:  Cindi Padilla is a 27 year old female who presents with chest pain. Differential includes chest wall pain given all the activity she has been doing, pericarditis, less likely ACS, PE pneumothorax and GERD amongst others. Work up is normal. She does not want anything for pain. She is fairly active with running 5-6 miles on a regular basis without exertional symptoms. She has low cardiac risk factors. I do not feel she needs a stress test. Can try course of tylenol of Maalox. I suspect this is more musculoskeletal.  There are no  findings for pericarditis.     Diagnosis:      1. Acute chest pain     Disposition:  discharged to home    Scribe Disclosure:  I, Adolfo Conklin, am serving as a scribe at 7:45 PM on 2/25/2018 to document services personally performed by Robert Shepherd MD based on my observations and the provider's statements to me.     2/25/2018    EMERGENCY DEPARTMENT       Robert Shepherd MD  02/28/18 1164

## 2018-03-05 ENCOUNTER — OFFICE VISIT (OUTPATIENT)
Dept: FAMILY MEDICINE | Facility: CLINIC | Age: 28
End: 2018-03-05
Payer: COMMERCIAL

## 2018-03-05 VITALS
HEIGHT: 64 IN | WEIGHT: 130 LBS | HEART RATE: 65 BPM | SYSTOLIC BLOOD PRESSURE: 98 MMHG | DIASTOLIC BLOOD PRESSURE: 70 MMHG | OXYGEN SATURATION: 98 % | TEMPERATURE: 97.3 F | BODY MASS INDEX: 22.2 KG/M2

## 2018-03-05 DIAGNOSIS — R07.89 ATYPICAL CHEST PAIN: ICD-10-CM

## 2018-03-05 DIAGNOSIS — J45.40 MODERATE PERSISTENT ASTHMA WITHOUT COMPLICATION: ICD-10-CM

## 2018-03-05 DIAGNOSIS — Z00.00 ENCOUNTER FOR ROUTINE ADULT HEALTH EXAMINATION WITHOUT ABNORMAL FINDINGS: Primary | ICD-10-CM

## 2018-03-05 PROCEDURE — 99395 PREV VISIT EST AGE 18-39: CPT | Performed by: PHYSICIAN ASSISTANT

## 2018-03-05 NOTE — LETTER
[facility]          [address] [place] [address]                            [phone]  Fax: [phone]    Cindi Padilla  [address] [address]    [phone]    March 5, 2018      To whom it may concern     Cindi Padilla received her TDAP vaccination on 03/7/2017. .  If you have any other questions or concerns please feel free to contact me at anytime.        Sincerely,      Sherif Bull PA-C

## 2018-03-05 NOTE — MR AVS SNAPSHOT
After Visit Summary   3/5/2018    Cindi Padilla    MRN: [id number]           Patient Information     Date Of Birth          1990        Visit Information        Provider Department      3/5/2018 10:00 AM Sherif Bull PA-C [facility]        Today's Diagnoses     Encounter for routine adult health examination without abnormal findings    -  1    Atypical chest pain        Moderate persistent asthma without complication          Care Instructions      Preventive Health Recommendations  Female Ages 26 - 39  Yearly exam:   See your health care provider every year in order to    Review health changes.     Discuss preventive care.      Review your medicines if you your doctor has prescribed any.    Until age 30: Get a Pap test every three years (more often if you have had an abnormal result).    After age 30: Talk to your doctor about whether you should have a Pap test every 3 years or have a Pap test with HPV screening every 5 years.   You do not need a Pap test if your uterus was removed (hysterectomy) and you have not had cancer.  You should be tested each year for STDs (sexually transmitted diseases), if you're at risk.   Talk to your provider about how often to have your cholesterol checked.  If you are at risk for diabetes, you should have a diabetes test (fasting glucose).  Shots: Get a flu shot each year. Get a tetanus shot every 10 years.   Nutrition:     Eat at least 5 servings of fruits and vegetables each day.    Eat whole-grain bread, whole-wheat pasta and brown rice instead of white grains and rice.    Talk to your provider about Calcium and Vitamin D.     Lifestyle    Exercise at least 150 minutes a week (30 minutes a day, 5 days of the week). This will help you control your weight and prevent disease.    Limit alcohol to one drink per day.    No smoking.     Wear sunscreen to prevent skin cancer.    See your dentist every six months for an exam and  "cleaning.            Follow-ups after your visit        Follow-up notes from your care team     Return in about 1 year (around 3/5/2019) for Physical Exam.      Your next 10 appointments already scheduled     Mar 05, 2018 10:00 AM CST   New Patient with Sherif Bull PA-C   [facility] [place] [place] ([facility] [place] [place])    [address] [place], [place] [address]en [facility] [id number] [phone]              Who to contact     If you have questions or need follow up information about today's clinic visit or your schedule please contact [facility] directly at [phone].  Normal or non-critical lab and imaging results will be communicated to you by MyChart, letter or phone within 4 business days after the clinic has received the results. If you do not hear from us within 7 days, please contact the clinic through [email or web address] or phone. If you have a critical or abnormal lab result, we will notify you by phone as soon as possible.  Submit refill requests through "[employer] or call your pharmacy and they will forward the refill request to us. Please allow 3 business days for your refill to be completed.          Additional Information About Your Visit        MyChart Information     "[employer] lets you send messages to your doctor, view your test results, renew your prescriptions, schedule appointments and more. To sign up, go to [email or web address] . Click on \"Log in\" on the left side of the screen, which will take you to the Welcome page. Then click on \"Sign up Now\" on the right side of the page.     You will be asked to enter the access code listed below, as well as some personal information. Please follow the directions to create your username and password.     Your access code is: [email or web address]  Expires: 2018  9:26 PM     Your access code will  in 90 days. If you need help or a new code, please call your [facility] or [phone].        Care EveryWhere ID     This is " "your Care EveryWhere ID. This could be used by other organizations to access your [facility] medical records  [id number]        Your Vitals Were     Pulse Temperature Height Last Period Pulse Oximetry BMI (Body Mass Index)    65 97.3  F (36.3  C) (Tympanic) 5' 4\" (1.626 m) 02/21/2018 98% 22.31 kg/m2       Blood Pressure from Last 3 Encounters:   03/05/18 98/70   02/25/18 106/66   03/07/17 118/64    Weight from Last 3 Encounters:   03/05/18 130 lb (59 kg)   03/07/17 125 lb (56.7 kg)   07/10/14 127 lb (57.6 kg)              Today, you had the following     No orders found for display       Primary Care Provider Office Phone # Fax #    [facility] [phone]       [address] [facility] [address]        Equal Access to Services     MARILOU FATIMA : Hadii aad ku hadasho Soomaali, waaxda luqadaha, qaybta kaalmada adeegyada, waxay marimarin haychayo bravo . So [facility] [phone].    ATENCIÓN: Si salvadorla español, tiene a davidson disposición servicios gratuitos de asistencia lingüística. Llame al [phone].    We comply with applicable federal civil rights laws and [place] laws. We do not discriminate on the basis of race, color, national origin, age, disability, sex, sexual orientation, or gender identity.            Thank you!     Thank you for choosing [facility]  for your care. Our goal is always to provide you with excellent care. Hearing back from our patients is one way we can continue to improve our services. Please take a few minutes to complete the written survey that you may receive in the mail after your visit with us. Thank you!             Your Updated Medication List - Protect others around you: Learn how to safely use, store and throw away your medicines at www.disposemymeds.org.          This list is accurate as of 3/5/18  9:49 AM.  Always use your most recent med list.                   Brand Name Dispense Instructions for use Diagnosis "    albuterol 108 (90 BASE) MCG/ACT Inhaler    PROAIR HFA/PROVENTIL HFA/VENTOLIN HFA    2 Inhaler    Inhale 2 puffs into the lungs every 6 hours as needed for shortness of breath / dyspnea    Moderate persistent asthma without complication       fluticasone-salmeterol 250-50 MCG/DOSE diskus inhaler    ADVAIR DISKUS    60 Inhaler    Inhale 1 puff into the lungs 2 times daily    Moderate persistent asthma without complication

## 2018-03-05 NOTE — PROGRESS NOTES
SUBJECTIVE:   CC: Cindi Padilla is an 27 year old woman who presents for preventive health visit.     Healthy Habits:    Do you get at least three servings of calcium containing foods daily (dairy, green leafy vegetables, etc.)? yes    Amount of exercise or daily activities, outside of work: 6-7 day(s) per week    Problems taking medications regularly No    Medication side effects: No    Have you had an eye exam in the past two years? yes    Do you see a dentist twice per year? no    Do you have sleep apnea, excessive snoring or daytime drowsiness?no      PROBLEMS TO ADD ON... Was seen in [facility] on 2-25  for chest pain, all tests came back normal: She continues to have intermittent dull ache in the last side of her chest underneath her breasts.  This comes and goes and is not severe or associated with  Dizziness, SOB or nausea.     Today's PHQ-2 Score:   PHQ-2 ( 1999 Pfizer) 3/7/2017 7/11/2014   Q1: Little interest or pleasure in doing things 0 0   Q2: Feeling down, depressed or hopeless 0 0   PHQ-2 Score 0 0       Abuse: Current or Past(Physical, Sexual or Emotional)- NO  Do you feel safe in your environment - YES    Social History   Substance Use Topics     Smoking status: Never Smoker     Smokeless tobacco: Never Used     Alcohol use Yes     If you drink alcohol do you typically have >3 drinks per day or >7 drinks per week? No                     Reviewed orders with patient.  Reviewed health maintenance and updated orders accordingly - Yes  Patient Active Problem List   Diagnosis     Moderate persistent asthma without complication     CARDIOVASCULAR SCREENING; LDL GOAL LESS THAN 160     Dairy product intolerance     Past Surgical History:   Procedure Laterality Date     LASIK BILATERAL Bilateral 03/2016     NO HISTORY OF SURGERY       wisdom teeth         Social History   Substance Use Topics     Smoking status: Never Smoker     Smokeless tobacco: Never Used     Alcohol use Yes     Family History    Problem Relation Age of Onset     Asthma Mother      Hypothyroidism Mother      Hypertension Father      well-controlled     Colon Polyps Father      Angina Maternal Grandfather      no h/o MI     Alzheimer Disease Paternal Grandmother      passed from kidney stone leading to sepsis     Colon Cancer Paternal Grandfather 80     did not pass from this     Pacemaker Paternal Grandfather      Heart Failure Paternal Grandfather      Colon Polyps Paternal Aunt      Breast Cancer No family hx of          Current Outpatient Prescriptions   Medication Sig Dispense Refill     albuterol (PROAIR HFA/PROVENTIL HFA/VENTOLIN HFA) 108 (90 BASE) MCG/ACT Inhaler Inhale 2 puffs into the lungs every 6 hours as needed for shortness of breath / dyspnea 2 Inhaler 5     fluticasone-salmeterol (ADVAIR DISKUS) 250-50 MCG/DOSE diskus inhaler Inhale 1 puff into the lungs 2 times daily 60 Inhaler 5     No Known Allergies  Recent Labs   Lab Test  02/25/18 2007   CR  1.02   GFRESTIMATED  65   GFRESTBLACK  79   POTASSIUM  3.9        Mammogram not appropriate for this patient based on age.    Pertinent mammograms are reviewed under the imaging tab.  History of abnormal Pap smear: NO - age 21-29 PAP every 3 years recommended    Reviewed and updated as needed this visit by clinical staff  Tobacco  Allergies  Meds  Surg Hx  Fam Hx  Soc Hx        Reviewed and updated as needed this visit by Provider  Tobacco  Surg Hx  Fam Hx  Soc Hx           ROS:  C: NEGATIVE for fever, chills, change in weight  I: NEGATIVE for worrisome rashes, moles or lesions  E: NEGATIVE for vision changes or irritation  ENT: NEGATIVE for ear, mouth and throat problems  R: NEGATIVE for significant cough or SOB  B: NEGATIVE for masses, tenderness or discharge  CV: See above  GI: NEGATIVE for nausea, abdominal pain, heartburn, or change in bowel habits  : NEGATIVE for unusual urinary or vaginal symptoms. Periods are regular.  M: NEGATIVE for significant arthralgias or  "myalgia  N: NEGATIVE for weakness, dizziness or paresthesias  P: NEGATIVE for changes in mood or affect    OBJECTIVE:   BP 98/70 (BP Location: Left arm, Patient Position: Chair, Cuff Size: Adult Regular)  Pulse 65  Temp 97.3  F (36.3  C) (Tympanic)  Ht 5' 4\" (1.626 m)  Wt 130 lb (59 kg)  LMP 02/21/2018  SpO2 98%  BMI 22.31 kg/m2  EXAM:  GENERAL: healthy, alert and no distress  EYES: Eyes grossly normal to inspection, PERRL and conjunctivae and sclerae normal  HENT: ear canals and TM's normal, nose and mouth without ulcers or lesions  NECK: no adenopathy, no asymmetry, masses, or scars and thyroid normal to palpation  RESP: lungs clear to auscultation - no rales, rhonchi or wheezes  CV: regular rate and rhythm, normal S1 S2, no S3 or S4, no murmur, click or rub,  ABDOMEN: soft, nontender, no hepatosplenomegaly, no masses and bowel sounds normal  MS: no gross musculoskeletal defects noted, no edema  SKIN: no suspicious lesions or rashes  NEURO: Normal strength and tone, mentation intact and speech normal  PSYCH: mentation appears normal, affect normal/bright    ASSESSMENT/PLAN:   1. Encounter for routine adult health examination without abnormal findings    2. Atypical chest pain  No current pain, ED workup unremarkable.  Advise that patient monitor her symptoms for a pattern and for any worsening.  If persistent or worsening will consider echo/holter    3. Moderate persistent asthma without complication        COUNSELING:   Reviewed preventive health counseling, as reflected in patient instructions       Regular exercise       Healthy diet/nutrition         reports that she has never smoked. She has never used smokeless tobacco.    Estimated body mass index is 22.31 kg/(m^2) as calculated from the following:    Height as of this encounter: 5' 4\" (1.626 m).    Weight as of this encounter: 130 lb (59 kg).       Counseling Resources:  ATP IV Guidelines  Pooled Cohorts Equation Calculator  Breast Cancer Risk " Calculator  FRAX Risk Assessment  ICSI Preventive Guidelines  Dietary Guidelines for Americans, 2010  USDA's MyPlate  ASA Prophylaxis  Lung CA Screening    Sherif Bull PA-C  [facility] DALJIT CONNER

## 2018-03-06 ASSESSMENT — ASTHMA QUESTIONNAIRES: ACT_TOTALSCORE: 23

## 2018-08-06 DIAGNOSIS — J45.40 MODERATE PERSISTENT ASTHMA WITHOUT COMPLICATION: ICD-10-CM

## 2018-08-06 NOTE — TELEPHONE ENCOUNTER
"Requested Prescriptions   Pending Prescriptions Disp Refills     fluticasone-salmeterol (ADVAIR DISKUS) 250-50 MCG/DOSE diskus inhaler  Last Written Prescription Date:  3/7/17  Last Fill Quantity: 60,  # refills: 5   Last office visit: 3/5/2018 with prescribing provider:  Jorgito     Future Office Visit:     60 Inhaler 5     Sig: Inhale 1 puff into the lungs 2 times daily    Inhaled Steroids Protocol Passed    8/6/2018 11:34 AM       Passed - Patient is age 12 or older       Passed - Asthma control assessment score within normal limits in last 6 months    Please review ACT score.   ACT Total Scores 3/7/2017 3/5/2018   ACT TOTAL SCORE (Goal Greater than or Equal to 20) 22 23   In the past 12 months, how many times did you visit the emergency room for your asthma without being admitted to the hospital? 0 0   In the past 12 months, how many times were you hospitalized overnight because of your asthma? 0 0              Passed - Recent (6 mo) or future (30 days) visit within the authorizing provider's specialty    Patient had office visit in the last 6 months or has a visit in the next 30 days with authorizing provider or within the authorizing provider's specialty.  See \"Patient Info\" tab in inbasket, or \"Choose Columns\" in Meds & Orders section of the refill encounter.              "

## 2018-08-06 NOTE — TELEPHONE ENCOUNTER
Medication is being filled for 1 time refill only due to:  Due for asthma check up in September   Alexia Pitts RN- Triage [employer]

## 2018-11-01 ENCOUNTER — TELEPHONE (OUTPATIENT)
Dept: FAMILY MEDICINE | Facility: CLINIC | Age: 28
End: 2018-11-01

## 2018-11-01 DIAGNOSIS — Z28.39 INCOMPLETE IMMUNIZATION STATUS: Primary | ICD-10-CM

## 2018-11-01 NOTE — TELEPHONE ENCOUNTER
Ordered. Notify patient.    Blair Quispe MD  [facility], [place]

## 2018-11-01 NOTE — TELEPHONE ENCOUNTER
Reason for Call: Request for an order or referral:    Order or referral being requested: needs tiders for lab work for Hep B and Varicella -scheduled lab only for next week    Date needed: as soon as possible    Has the patient been seen by the PCP for this problem? NO    Additional comments: na    Phone number Patient can be reached at:  Cell number on file:    Telephone Information:   Mobile [phone]       Best Time:  any    Can we leave a detailed message on this number?  YES    Call taken on 11/1/2018 at 8:32 AM by Janell Watson

## 2018-11-06 ENCOUNTER — TELEPHONE (OUTPATIENT)
Dept: FAMILY MEDICINE | Facility: CLINIC | Age: 28
End: 2018-11-06

## 2018-11-06 DIAGNOSIS — Z28.39 INCOMPLETE IMMUNIZATION STATUS: ICD-10-CM

## 2018-11-06 DIAGNOSIS — J45.40 MODERATE PERSISTENT ASTHMA WITHOUT COMPLICATION: ICD-10-CM

## 2018-11-06 PROCEDURE — 36415 COLL VENOUS BLD VENIPUNCTURE: CPT | Performed by: FAMILY MEDICINE

## 2018-11-06 PROCEDURE — 86706 HEP B SURFACE ANTIBODY: CPT | Performed by: FAMILY MEDICINE

## 2018-11-06 PROCEDURE — 86787 VARICELLA-ZOSTER ANTIBODY: CPT | Performed by: FAMILY MEDICINE

## 2018-11-06 NOTE — TELEPHONE ENCOUNTER
ACT Total Scores 3/7/2017 3/5/2018 11/6/2018   ACT TOTAL SCORE (Goal Greater than or Equal to 20) 22 23 22   In the past 12 months, how many times did you visit the emergency room for your asthma without being admitted to the hospital? 0 0 0   In the past 12 months, how many times were you hospitalized overnight because of your asthma? 0 0 0

## 2018-11-06 NOTE — TELEPHONE ENCOUNTER
"Requested Prescriptions   Pending Prescriptions Disp Refills     ADVAIR DISKUS 250-50 MCG/DOSE diskus inhaler [Pharmacy Med Name: ADVAIR DISKUS 250-50MCG/DOSE AEPB]  Last Written Prescription Date:  8-6-2018  Last Fill Quantity: 60 inhaler,  # refills: 0   Last office visit: 3/5/2018 with prescribing provider:     Future Office Visit:      0     Sig: INHALE ONE PUFF BY MOUTH TWICE A DAY. DUE FOR ASTHMA CHECK UP IN SEPTEMBER    Inhaled Steroids Protocol Failed    11/6/2018  2:35 PM       Failed - Recent (6 mo) or future (30 days) visit within the authorizing provider's specialty    Patient had office visit in the last 6 months or has a visit in the next 30 days with authorizing provider or within the authorizing provider's specialty.  See \"Patient Info\" tab in inbasket, or \"Choose Columns\" in Meds & Orders section of the refill encounter.           Passed - Patient is age 12 or older       Passed - Asthma control assessment score within normal limits in last 6 months    Please review ACT score.             "

## 2018-11-07 LAB
HBV SURFACE AB SERPL IA-ACNC: 3.56 M[IU]/ML
VZV IGG SER QL IA: 3.3 AI (ref 0–0.8)

## 2018-11-07 ASSESSMENT — ASTHMA QUESTIONNAIRES: ACT_TOTALSCORE: 22

## 2018-11-07 NOTE — PROGRESS NOTES
Please call the patient to notify patient that the blood work is shows negative titer for Hep B. She needs vaccination.   Varicella titer is positive.     Blair Quispe MD

## 2018-11-12 ENCOUNTER — OFFICE VISIT (OUTPATIENT)
Dept: NURSING | Facility: CLINIC | Age: 28
End: 2018-11-12
Payer: COMMERCIAL

## 2018-11-12 DIAGNOSIS — Z23 NEED FOR VACCINATION: Primary | ICD-10-CM

## 2018-11-12 PROCEDURE — 99207 ZZC NO CHARGE NURSE ONLY: CPT

## 2018-11-12 PROCEDURE — 90471 IMMUNIZATION ADMIN: CPT

## 2018-11-12 PROCEDURE — 90746 HEPB VACCINE 3 DOSE ADULT IM: CPT

## 2018-11-12 NOTE — MR AVS SNAPSHOT
After Visit Summary   11/12/2018    Cindi Padilla    MRN: [id number]           Patient Information     Date Of Birth          1990        Visit Information        Provider Department      11/12/2018 3:00 PM EC MA/LPN [facility]        Today's Diagnoses     Need for vaccination    -  1       Follow-ups after your visit        Who to contact     If you have questions or need follow up information about today's clinic visit or your schedule please contact [facility] directly at [phone].  Normal or non-critical lab and imaging results will be communicated to you by MyChart, letter or phone within 4 business days after the clinic has received the results. If you do not hear from us within 7 days, please contact the clinic through MyChart or phone. If you have a critical or abnormal lab result, we will notify you by phone as soon as possible.  Submit refill requests through [employer] or call your pharmacy and they will forward the refill request to us. Please allow 3 business days for your refill to be completed.          Additional Information About Your Visit        Care EveryWhere ID     This is your Care EveryWhere ID. This could be used by other organizations to access your [facility] medical records  [id number]         Blood Pressure from Last 3 Encounters:   03/05/18 98/70   02/25/18 106/66   03/07/17 118/64    Weight from Last 3 Encounters:   03/05/18 130 lb (59 kg)   03/07/17 125 lb (56.7 kg)   07/10/14 127 lb (57.6 kg)              We Performed the Following     1st  Administration  [[id number]]     HEPATITIS B VACCINE,  ADULT  [[id number]]        Primary Care Provider Office Phone # Fax #    [facility] [phone] [address] [facility] [address]        Equal Access to Services     MARILOU FATIMA AH: David Ramirez, watanjada andrew, qaybta kaalmada adearely, nessa bravo  ah. So [facility] [phone].    ATENCIÓN: Si habla husam, tiene a davidson disposición servicios gratuitos de asistencia lingüística. Daria al [phone].    We comply with applicable federal civil rights laws and [place] laws. We do not discriminate on the basis of race, color, national origin, age, disability, sex, sexual orientation, or gender identity.            Thank you!     Thank you for choosing [facility] [place]  for your care. Our goal is always to provide you with excellent care. Hearing back from our patients is one way we can continue to improve our services. Please take a few minutes to complete the written survey that you may receive in the mail after your visit with us. Thank you!             Your Updated Medication List - Protect others around you: Learn how to safely use, store and throw away your medicines at www.disposemymeds.org.          This list is accurate as of 11/12/18  3:20 PM.  Always use your most recent med list.                   Brand Name Dispense Instructions for use Diagnosis    ADVAIR DISKUS 250-50 MCG/DOSE diskus inhaler   Generic drug:  fluticasone-salmeterol     60 Inhaler    INHALE ONE PUFF BY MOUTH TWICE A DAY. DUE FOR ASTHMA CHECK UP IN SEPTEMBER    Moderate persistent asthma without complication       albuterol 108 (90 Base) MCG/ACT inhaler    PROAIR HFA/PROVENTIL HFA/VENTOLIN HFA    2 Inhaler    Inhale 2 puffs into the lungs every 6 hours as needed for shortness of breath / dyspnea    Moderate persistent asthma without complication

## 2018-12-11 ENCOUNTER — TELEPHONE (OUTPATIENT)
Dept: FAMILY MEDICINE | Facility: CLINIC | Age: 28
End: 2018-12-11

## 2018-12-11 DIAGNOSIS — Z23 NEED FOR VACCINATION: Primary | ICD-10-CM

## 2018-12-11 NOTE — TELEPHONE ENCOUNTER
Please see message below. Patient is needing another hep b titer after vaccination. Lab pended.   Manda Sanchez RN   [facility]3] - Triage

## 2018-12-11 NOTE — TELEPHONE ENCOUNTER
Reason for Call:  Other call back    Detailed comments: Pt is wanting a titer for hep b for school.   Needs to have another one done. Need orders    Phone Number Patient can be reached at: Home number on file [phone] (home)    Best Time: anytime    Can we leave a detailed message on this number? YES    Call taken on 12/11/2018 at 7:42 AM by Olivia De Jesus

## 2019-01-29 LAB
BLD GP AB SCN SERPL QL: NEGATIVE
HBV SURFACE AG SERPL QL IA: NONREACTIVE
HIV 1+2 AB+HIV1 P24 AG SERPL QL IA: NONREACTIVE
RUBELLA ANTIBODY IGG QUANTITATIVE: NORMAL IU/ML
TREPONEMA ANTIBODIES: NONREACTIVE

## 2019-02-11 ENCOUNTER — TRANSFERRED RECORDS (OUTPATIENT)
Dept: HEALTH INFORMATION MANAGEMENT | Facility: CLINIC | Age: 29
End: 2019-02-11

## 2019-02-11 LAB — PAP SMEAR - HIM PATIENT REPORTED: NEGATIVE

## 2019-03-08 NOTE — PROGRESS NOTES
SUBJECTIVE:   CC: Cindi Padilla is an 28 year old woman who presents for preventive health visit.     Healthy Habits:    Do you get at least three servings of calcium containing foods daily (dairy, green leafy vegetables, etc.)? yes    Amount of exercise or daily activities, outside of work: not much but usually 30-60 min per day    Problems taking medications regularly No    Medication side effects: No    Have you had an eye exam in the past two years? no    Do you see a dentist twice per year? yes    Do you have sleep apnea, excessive snoring or daytime drowsiness?no  Pap smear done on this date: 02/11/2019 (approximately), by this group: [facility] in [place], results were normal.         Cindi is currently 18 weeks pregnant with her first child.  feeling well so far, no complications in her pregnancy thus far.  She is following with Dr. Phoenix at [facility].    She needs another Hep B titer for work. She was vaccinated as a child but does not have records.         Today's PHQ-2 Score:   PHQ-2 ( 1999 Pfizer) 3/11/2019 3/7/2017   Q1: Little interest or pleasure in doing things 0 0   Q2: Feeling down, depressed or hopeless 0 0   PHQ-2 Score 0 0       Abuse: Current or Past(Physical, Sexual or Emotional)- No  Do you feel safe in your environment? Yes    Social History     Tobacco Use     Smoking status: Never Smoker     Smokeless tobacco: Never Used   Substance Use Topics     Alcohol use: Yes     If you drink alcohol do you typically have >3 drinks per day or >7 drinks per week? Not Applicable                     Reviewed orders with patient.  Reviewed health maintenance and updated orders accordingly - Yes  Patient Active Problem List   Diagnosis     Moderate persistent asthma without complication     CARDIOVASCULAR SCREENING; LDL GOAL LESS THAN 160     Dairy product intolerance     Past Surgical History:   Procedure Laterality Date     LASIK BILATERAL Bilateral 03/2016     NO HISTORY OF SURGERY       george  teeth         Social History     Tobacco Use     Smoking status: Never Smoker     Smokeless tobacco: Never Used   Substance Use Topics     Alcohol use: Yes     Family History   Problem Relation Age of Onset     Asthma Mother      Hypothyroidism Mother      Hypertension Father         well-controlled     Colon Polyps Father      Angina Maternal Grandfather         no h/o MI     Alzheimer Disease Paternal Grandmother         passed from kidney stone leading to sepsis     Colon Cancer Paternal Grandfather 80        did not pass from this     Pacemaker Paternal Grandfather      Heart Failure Paternal Grandfather      Colon Polyps Paternal Aunt      Breast Cancer No family hx of          Current Outpatient Medications   Medication Sig Dispense Refill     ADVAIR DISKUS 250-50 MCG/DOSE diskus inhaler INHALE ONE PUFF BY MOUTH TWICE A DAY. DUE FOR ASTHMA CHECK UP IN  Inhaler 1     albuterol (PROAIR HFA/PROVENTIL HFA/VENTOLIN HFA) 108 (90 BASE) MCG/ACT Inhaler Inhale 2 puffs into the lungs every 6 hours as needed for shortness of breath / dyspnea 2 Inhaler 5     Prenatal Multivit-Min-Fe-FA (PRE- PO)        ranitidine (ZANTAC) 150 MG tablet        No Known Allergies  Recent Labs   Lab Test 18   CR 1.02   GFRESTIMATED 65   GFRESTBLACK 79   POTASSIUM 3.9        Mammogram not appropriate for this patient based on age.    Pertinent mammograms are reviewed under the imaging tab.  History of abnormal Pap smear: NO - age 21-30 PAP every 3 years recommended     Reviewed and updated as needed this visit by clinical staff  Tobacco  Allergies  Meds  Med Hx  Surg Hx  Fam Hx  Soc Hx        Reviewed and updated as needed this visit by Provider  Tobacco  Med Hx  Surg Hx  Fam Hx  Soc Hx       Past Medical History:   Diagnosis Date     NO ACTIVE PROBLEMS       Past Surgical History:   Procedure Laterality Date     LASIK BILATERAL Bilateral 2016     NO HISTORY OF SURGERY       wisdom teeth    "    Obstetric History       T0      L0     SAB0   TAB0   Ectopic0   Multiple0   Live Births0       # Outcome Date GA Lbr Aime/2nd Weight Sex Delivery Anes PTL Lv   1 Current                   ROS:  CONSTITUTIONAL: NEGATIVE for fever, chills, change in weight  INTEGUMENTARU/SKIN: NEGATIVE for worrisome rashes, moles or lesions  EYES: NEGATIVE for vision changes or irritation  ENT: NEGATIVE for ear, mouth and throat problems  RESP: NEGATIVE for significant cough or SOB  BREAST: NEGATIVE for masses, tenderness or discharge  CV: NEGATIVE for chest pain, palpitations or peripheral edema  GI: NEGATIVE for nausea, abdominal pain, heartburn, or change in bowel habits  : NEGATIVE for unusual urinary or vaginal symptoms. Periods are absent due to pregnancy  MUSCULOSKELETAL: NEGATIVE for significant arthralgias or myalgia  NEURO: NEGATIVE for weakness, dizziness or paresthesias  PSYCHIATRIC: NEGATIVE for changes in mood or affect    OBJECTIVE:   /64   Pulse 98   Temp 96.8  F (36  C) (Tympanic)   Ht 1.676 m (5' 6\")   Wt 66.2 kg (146 lb)   SpO2 100%   BMI 23.57 kg/m    EXAM:  GENERAL: healthy, alert and no distress  EYES: Eyes grossly normal to inspection, PERRL and conjunctivae and sclerae normal  HENT: ear canals and TM's normal, nose and mouth without ulcers or lesions  NECK: no adenopathy, no asymmetry, masses, or scars and thyroid normal to palpation  RESP: lungs clear to auscultation - no rales, rhonchi or wheezes  CV: regular rate and rhythm, normal S1 S2, no S3 or S4, no murmur, click or rub, no peripheral edema   ABDOMEN: soft, nontender, no hepatosplenomegaly, no masses and bowel sounds normal  MS: right ankle and foot edema, associated mild TTP of medial foot and plantar right heal, FROM of toes and ankle  SKIN: no suspicious lesions or rashes  NEURO: Normal strength and tone, mentation intact and speech normal  PSYCH: mentation appears normal, affect normal/bright    Diagnostic Test " "Results:  none     ASSESSMENT/PLAN:   1. Encounter for routine adult health examination without abnormal findings  - Hepatitis B Surface Antibody    2. Pregnant state, incidental  Following with OBGYN    3. Tendonitis  Right foot with edema and TTP along plantar fascia and medial aspect of foot.  Etiology likely tendonitis vs. Plantar fasciitis. Patient is compressing with ace, icing and elevating when able which is helping.  Dicussed different exercises for plantar fascitis.  Her BP is normal, symptoms present prior to pregnancy. Will continue to monitor.  May consider advanced imaging if persistent.       COUNSELING:   Reviewed preventive health counseling, as reflected in patient instructions       Regular exercise       Healthy diet/nutrition    BP Readings from Last 1 Encounters:   03/11/19 118/64     Estimated body mass index is 23.57 kg/m  as calculated from the following:    Height as of this encounter: 1.676 m (5' 6\").    Weight as of this encounter: 66.2 kg (146 lb).           reports that  has never smoked. she has never used smokeless tobacco.      Counseling Resources:  ATP IV Guidelines  Pooled Cohorts Equation Calculator  Breast Cancer Risk Calculator  FRAX Risk Assessment  ICSI Preventive Guidelines  Dietary Guidelines for Americans, 2010  USDA's MyPlate  ASA Prophylaxis  Lung CA Screening    Sherif Bull PA-C  [facility] [place]  "

## 2019-03-11 ENCOUNTER — OFFICE VISIT (OUTPATIENT)
Dept: FAMILY MEDICINE | Facility: CLINIC | Age: 29
End: 2019-03-11
Payer: COMMERCIAL

## 2019-03-11 VITALS
WEIGHT: 146 LBS | TEMPERATURE: 96.8 F | HEIGHT: 66 IN | DIASTOLIC BLOOD PRESSURE: 64 MMHG | SYSTOLIC BLOOD PRESSURE: 118 MMHG | BODY MASS INDEX: 23.46 KG/M2 | HEART RATE: 98 BPM | OXYGEN SATURATION: 100 %

## 2019-03-11 DIAGNOSIS — Z00.00 ENCOUNTER FOR ROUTINE ADULT HEALTH EXAMINATION WITHOUT ABNORMAL FINDINGS: Primary | ICD-10-CM

## 2019-03-11 DIAGNOSIS — M77.9 TENDONITIS: ICD-10-CM

## 2019-03-11 DIAGNOSIS — Z33.1 PREGNANT STATE, INCIDENTAL: ICD-10-CM

## 2019-03-11 LAB — HBV SURFACE AB SERPL IA-ACNC: 23.53 M[IU]/ML

## 2019-03-11 PROCEDURE — 99395 PREV VISIT EST AGE 18-39: CPT | Performed by: PHYSICIAN ASSISTANT

## 2019-03-11 PROCEDURE — 36415 COLL VENOUS BLD VENIPUNCTURE: CPT | Performed by: PHYSICIAN ASSISTANT

## 2019-03-11 PROCEDURE — 86706 HEP B SURFACE ANTIBODY: CPT | Performed by: PHYSICIAN ASSISTANT

## 2019-03-11 ASSESSMENT — MIFFLIN-ST. JEOR: SCORE: 1414

## 2019-03-11 NOTE — Clinical Note
Pap smear done on this date: 02/11/2019 (approximately), by this group: [facility] in [place], results were normal.

## 2019-03-12 ENCOUNTER — TELEPHONE (OUTPATIENT)
Dept: FAMILY MEDICINE | Facility: CLINIC | Age: 29
End: 2019-03-12

## 2019-03-12 ASSESSMENT — ASTHMA QUESTIONNAIRES: ACT_TOTALSCORE: 24

## 2019-03-12 NOTE — TELEPHONE ENCOUNTER
Please call Cindi and inform her that her Hep B titer is positive indicating immunity to Hep B.  I have printed a letter for her job stating this which is in by box for

## 2019-03-13 LAB
C TRACH DNA SPEC QL PROBE+SIG AMP: NEGATIVE
N GONORRHOEA DNA SPEC QL PROBE+SIG AMP: NEGATIVE

## 2019-03-13 NOTE — TELEPHONE ENCOUNTER
TC has letter in in-basket.      .Marga VELARDE    [facility]

## 2019-03-13 NOTE — TELEPHONE ENCOUNTER
Non detailed message left for pt to return call to clinic and ask to speak with a triage nurse.    Tere RAMIRES RN  EP Triage

## 2019-03-14 NOTE — TELEPHONE ENCOUNTER
Left a non detailed message for patient to return call.     Arabella TRAVISN, RN   [facility]

## 2019-03-15 NOTE — TELEPHONE ENCOUNTER
Attempt #3. Left a non detailed message for patient to return call.     Routing to team 3 to send patient a letter    Arabella COOL, RN   [facility]

## 2019-03-15 NOTE — TELEPHONE ENCOUNTER
Letter has been mailed to patient.      .Marga VELARDE    [facility]

## 2019-06-20 DIAGNOSIS — J45.40 MODERATE PERSISTENT ASTHMA WITHOUT COMPLICATION: ICD-10-CM

## 2019-06-20 NOTE — TELEPHONE ENCOUNTER
"Requested Prescriptions   Pending Prescriptions Disp Refills     ADVAIR DISKUS 250-50 MCG/DOSE inhaler [Pharmacy Med Name: ADVAIR  Last Written Prescription Date:  11-6-2018  Last Fill Quantity: 60 inhaler,  # refills: 1   Last office visit: 3/11/2019 with prescribing provider:     Future Office Visit:     DISKUS 250-50MCG/DOSE AEPB]  1     Sig: INHALE ONE PUFF BY MOUTH TWICE A DAY       Inhaled Steroids Protocol Passed - 6/20/2019 12:43 PM        Passed - Patient is age 12 or older        Passed - Asthma control assessment score within normal limits in last 6 months     Please review ACT score.           Passed - Medication is active on med list        Passed - Recent (6 mo) or future (30 days) visit within the authorizing provider's specialty     Patient had office visit in the last 6 months or has a visit in the next 30 days with authorizing provider or within the authorizing provider's specialty.  See \"Patient Info\" tab in inbasket, or \"Choose Columns\" in Meds & Orders section of the refill encounter.              "

## 2019-06-20 NOTE — TELEPHONE ENCOUNTER
ACT Total Scores 3/5/2018 11/6/2018 3/11/2019   ACT TOTAL SCORE (Goal Greater than or Equal to 20) 23 22 24   In the past 12 months, how many times did you visit the emergency room for your asthma without being admitted to the hospital? 0 0 0   In the past 12 months, how many times were you hospitalized overnight because of your asthma? 0 0 0

## 2019-07-18 LAB — GROUP B STREP PCR: NEGATIVE

## 2019-08-25 ENCOUNTER — HOSPITAL ENCOUNTER (INPATIENT)
Facility: CLINIC | Age: 29
LOS: 5 days | Discharge: HOME | End: 2019-08-30
Attending: OBSTETRICS & GYNECOLOGY | Admitting: OBSTETRICS & GYNECOLOGY
Payer: COMMERCIAL

## 2019-08-25 LAB
ABO + RH BLD: NORMAL
ABO + RH BLD: NORMAL
SPECIMEN EXP DATE BLD: NORMAL

## 2019-08-25 PROCEDURE — 86780 TREPONEMA PALLIDUM: CPT | Performed by: OBSTETRICS & GYNECOLOGY

## 2019-08-25 PROCEDURE — 36415 COLL VENOUS BLD VENIPUNCTURE: CPT | Performed by: OBSTETRICS & GYNECOLOGY

## 2019-08-25 PROCEDURE — 25800030 ZZH RX IP 258 OP 636: Performed by: OBSTETRICS & GYNECOLOGY

## 2019-08-25 PROCEDURE — 86901 BLOOD TYPING SEROLOGIC RH(D): CPT | Performed by: OBSTETRICS & GYNECOLOGY

## 2019-08-25 PROCEDURE — 25000132 ZZH RX MED GY IP 250 OP 250 PS 637: Performed by: OBSTETRICS & GYNECOLOGY

## 2019-08-25 PROCEDURE — 86900 BLOOD TYPING SEROLOGIC ABO: CPT | Performed by: OBSTETRICS & GYNECOLOGY

## 2019-08-25 PROCEDURE — 3E0P7VZ INTRODUCTION OF HORMONE INTO FEMALE REPRODUCTIVE, VIA NATURAL OR ARTIFICIAL OPENING: ICD-10-PCS | Performed by: OBSTETRICS & GYNECOLOGY

## 2019-08-25 PROCEDURE — 12000000 ZZH R&B MED SURG/OB

## 2019-08-25 RX ORDER — NALOXONE HYDROCHLORIDE 0.4 MG/ML
.1-.4 INJECTION, SOLUTION INTRAMUSCULAR; INTRAVENOUS; SUBCUTANEOUS
Status: DISCONTINUED | OUTPATIENT
Start: 2019-08-25 | End: 2019-08-27

## 2019-08-25 RX ORDER — FENTANYL CITRATE 50 UG/ML
50-100 INJECTION, SOLUTION INTRAMUSCULAR; INTRAVENOUS
Status: DISCONTINUED | OUTPATIENT
Start: 2019-08-25 | End: 2019-08-29

## 2019-08-25 RX ORDER — ONDANSETRON 2 MG/ML
4 INJECTION INTRAMUSCULAR; INTRAVENOUS EVERY 6 HOURS PRN
Status: DISCONTINUED | OUTPATIENT
Start: 2019-08-25 | End: 2019-08-27

## 2019-08-25 RX ORDER — METHYLERGONOVINE MALEATE 0.2 MG/ML
200 INJECTION INTRAVENOUS
Status: DISCONTINUED | OUTPATIENT
Start: 2019-08-25 | End: 2019-08-29

## 2019-08-25 RX ORDER — ACETAMINOPHEN 325 MG/1
650 TABLET ORAL EVERY 4 HOURS PRN
Status: DISCONTINUED | OUTPATIENT
Start: 2019-08-25 | End: 2019-08-27

## 2019-08-25 RX ORDER — OXYTOCIN 10 [USP'U]/ML
10 INJECTION, SOLUTION INTRAMUSCULAR; INTRAVENOUS
Status: DISCONTINUED | OUTPATIENT
Start: 2019-08-25 | End: 2019-08-30 | Stop reason: HOSPADM

## 2019-08-25 RX ORDER — OXYTOCIN/0.9 % SODIUM CHLORIDE 30/500 ML
100-340 PLASTIC BAG, INJECTION (ML) INTRAVENOUS CONTINUOUS PRN
Status: DISCONTINUED | OUTPATIENT
Start: 2019-08-25 | End: 2019-08-30 | Stop reason: HOSPADM

## 2019-08-25 RX ORDER — CARBOPROST TROMETHAMINE 250 UG/ML
250 INJECTION, SOLUTION INTRAMUSCULAR
Status: DISCONTINUED | OUTPATIENT
Start: 2019-08-25 | End: 2019-08-27

## 2019-08-25 RX ORDER — IBUPROFEN 400 MG/1
800 TABLET, FILM COATED ORAL
Status: DISCONTINUED | OUTPATIENT
Start: 2019-08-25 | End: 2019-08-27

## 2019-08-25 RX ORDER — LIDOCAINE 40 MG/G
CREAM TOPICAL
Status: DISCONTINUED | OUTPATIENT
Start: 2019-08-25 | End: 2019-08-29

## 2019-08-25 RX ORDER — OXYCODONE AND ACETAMINOPHEN 5; 325 MG/1; MG/1
1 TABLET ORAL
Status: DISCONTINUED | OUTPATIENT
Start: 2019-08-25 | End: 2019-08-27

## 2019-08-25 RX ORDER — TERBUTALINE SULFATE 1 MG/ML
0.25 INJECTION, SOLUTION SUBCUTANEOUS
Status: DISCONTINUED | OUTPATIENT
Start: 2019-08-25 | End: 2019-08-27 | Stop reason: CLARIF

## 2019-08-25 RX ORDER — HYDROXYZINE HYDROCHLORIDE 50 MG/1
50-100 TABLET, FILM COATED ORAL EVERY 6 HOURS PRN
Status: DISCONTINUED | OUTPATIENT
Start: 2019-08-25 | End: 2019-08-27 | Stop reason: CLARIF

## 2019-08-25 RX ORDER — SODIUM CHLORIDE, SODIUM LACTATE, POTASSIUM CHLORIDE, CALCIUM CHLORIDE 600; 310; 30; 20 MG/100ML; MG/100ML; MG/100ML; MG/100ML
INJECTION, SOLUTION INTRAVENOUS CONTINUOUS
Status: DISCONTINUED | OUTPATIENT
Start: 2019-08-25 | End: 2019-08-29

## 2019-08-25 RX ADMIN — DINOPROSTONE 10 MG: 10 INSERT VAGINAL at 20:57

## 2019-08-25 RX ADMIN — SODIUM CHLORIDE, POTASSIUM CHLORIDE, SODIUM LACTATE AND CALCIUM CHLORIDE 500 ML: 600; 310; 30; 20 INJECTION, SOLUTION INTRAVENOUS at 21:00

## 2019-08-25 NOTE — LETTER
My Asthma Action Plan    Name: Cindi Padilla   YOB: 1990  Date: 8/25/2019   My doctor: No name on file.   My clinic: [facility]        My Rescue Medicine:   Albuterol inhaler (Proair/Ventolin/Proventil HFA)  2-4 puffs EVERY 4 HOURS as needed. Use a spacer if recommended by your provider.   My Asthma Severity:   Intermittent / Exercise Induced  Know your asthma triggers: upper respiratory infections and exercise or sports             GREEN ZONE   Good Control    I feel good    No cough or wheeze    Can work, sleep and play without asthma symptoms       Take your asthma control medicine every day.     1. If exercise triggers your asthma, take your rescue medication    15 minutes before exercise or sports, and    During exercise if you have asthma symptoms  2. Spacer to use with inhaler: If you have a spacer, make sure to use it with your inhaler             YELLOW ZONE Getting Worse  I have ANY of these:    I do not feel good    Cough or wheeze    Chest feels tight    Wake up at night   1. Keep taking your Green Zone medications  2. Start taking your rescue medicine:    every 20 minutes for up to 1 hour. Then every 4 hours for 24-48 hours.  3. If you stay in the Yellow Zone for more than 12-24 hours, contact your doctor.  4. If you do not return to the Green Zone in 12-24 hours or you get worse, start taking your oral steroid medicine if prescribed by your provider.           RED ZONE Medical Alert - Get Help  I have ANY of these:    I feel awful    Medicine is not helping    Breathing getting harder    Trouble walking or talking    Nose opens wide to breathe       1. Take your rescue medicine NOW  2. If your provider has prescribed an oral steroid medicine, start taking it NOW  3. Call your doctor NOW  4. If you are still in the Red Zone after 20 minutes and you have not reached your doctor:    Take your rescue medicine again and    Call 911 or go to the emergency room right  away    See your regular doctor within 2 weeks of an Emergency Room or Urgent Care visit for follow-up treatment.          Annual Reminders:  Meet with Asthma Educator,  Flu Shot in the Fall, consider Pneumonia Vaccination for patients with asthma (aged 19 and older).    Pharmacy:    [facility] [place] [id number] [facility] [phone]  [facility] [place]                        Asthma Triggers  How To Control Things That Make Your Asthma Worse    Triggers are things that make your asthma worse.  Look at the list below to help you find your triggers and   what you can do about them. You can help prevent asthma flare-ups by staying away from your triggers.      Trigger                                                          What you can do   Cigarette Smoke  Tobacco smoke can make asthma worse. Do not allow smoking in your home, car or around you.  Be sure no one smokes at a child s day care or school.  If you smoke, ask your health care provider for ways to help you quit.  Ask family members to quit too.  Ask your health care provider for a referral to Quit Plan to help you quit smoking, or call 3-175-317-PLAN.     Colds, Flu, Bronchitis  These are common triggers of asthma. Wash your hands often.  Don t touch your eyes, nose or mouth.  Get a flu shot every year.     Dust Mites  These are tiny bugs that live in cloth or carpet. They are too small to see. Wash sheets and blankets in hot water every week.   Encase pillows and mattress in dust mite proof covers.  Avoid having carpet if you can. If you have carpet, vacuum weekly.   Use a dust mask and HEPA vacuum.   Pollen and Outdoor Mold  Some people are allergic to trees, grass, or weed pollen, or molds. Try to keep your windows closed.  Limit time out doors when pollen count is high.   Ask you health care provider about taking medicine during allergy season.     Animal  Dander  Some people are allergic to skin flakes, urine or saliva from pets with fur or feathers. Keep pets with fur or feathers out of your home.    If you can t keep the pet outdoors, then keep the pet out of your bedroom.  Keep the bedroom door closed.  Keep pets off cloth furniture and away from stuffed toys.     Mice, Rats, and Cockroaches  Some people are allergic to the waste from these pests.   Cover food and garbage.  Clean up spills and food crumbs.  Store grease in the refrigerator.   Keep food out of the bedroom.   Indoor Mold  This can be a trigger if your home has high moisture. Fix leaking faucets, pipes, or other sources of water.   Clean moldy surfaces.  Dehumidify basement if it is damp and smelly.   Smoke, Strong Odors, and Sprays  These can reduce air quality. Stay away from strong odors and sprays, such as perfume, powder, hair spray, paints, smoke incense, paint, cleaning products, candles and new carpet.   Exercise or Sports  Some people with asthma have this trigger. Be active!  Ask your doctor about taking medicine before sports or exercise to prevent symptoms.    Warm up for 5-10 minutes before and after sports or exercise.     Other Triggers of Asthma  Cold air:  Cover your nose and mouth with a scarf.  Sometimes laughing or crying can be a trigger.  Some medicines and food can trigger asthma.

## 2019-08-26 ENCOUNTER — ANESTHESIA EVENT (OUTPATIENT)
Dept: OBGYN | Facility: CLINIC | Age: 29
End: 2019-08-26
Payer: COMMERCIAL

## 2019-08-26 ENCOUNTER — ANESTHESIA (OUTPATIENT)
Dept: OBGYN | Facility: CLINIC | Age: 29
End: 2019-08-26
Payer: COMMERCIAL

## 2019-08-26 LAB — T PALLIDUM AB SER QL: NONREACTIVE

## 2019-08-26 PROCEDURE — 25000128 H RX IP 250 OP 636: Performed by: ANESTHESIOLOGY

## 2019-08-26 PROCEDURE — 27110038 ZZH RX 271: Performed by: ANESTHESIOLOGY

## 2019-08-26 PROCEDURE — 12000000 ZZH R&B MED SURG/OB

## 2019-08-26 PROCEDURE — 25000125 ZZHC RX 250: Performed by: ANESTHESIOLOGY

## 2019-08-26 PROCEDURE — 25800030 ZZH RX IP 258 OP 636: Performed by: OBSTETRICS & GYNECOLOGY

## 2019-08-26 PROCEDURE — 25000125 ZZHC RX 250: Performed by: OBSTETRICS & GYNECOLOGY

## 2019-08-26 PROCEDURE — 00HU33Z INSERTION OF INFUSION DEVICE INTO SPINAL CANAL, PERCUTANEOUS APPROACH: ICD-10-PCS | Performed by: ANESTHESIOLOGY

## 2019-08-26 PROCEDURE — 25000132 ZZH RX MED GY IP 250 OP 250 PS 637: Performed by: OBSTETRICS & GYNECOLOGY

## 2019-08-26 PROCEDURE — 3E0R3BZ INTRODUCTION OF ANESTHETIC AGENT INTO SPINAL CANAL, PERCUTANEOUS APPROACH: ICD-10-PCS | Performed by: ANESTHESIOLOGY

## 2019-08-26 PROCEDURE — 37000011 ZZH ANESTHESIA WARD SERVICE

## 2019-08-26 PROCEDURE — 10907ZC DRAINAGE OF AMNIOTIC FLUID, THERAPEUTIC FROM PRODUCTS OF CONCEPTION, VIA NATURAL OR ARTIFICIAL OPENING: ICD-10-PCS | Performed by: OBSTETRICS & GYNECOLOGY

## 2019-08-26 PROCEDURE — 25000128 H RX IP 250 OP 636: Performed by: OBSTETRICS & GYNECOLOGY

## 2019-08-26 RX ORDER — LIDOCAINE HYDROCHLORIDE AND EPINEPHRINE 15; 5 MG/ML; UG/ML
INJECTION, SOLUTION EPIDURAL PRN
Status: DISCONTINUED | OUTPATIENT
Start: 2019-08-26 | End: 2019-08-27 | Stop reason: HOSPADM

## 2019-08-26 RX ORDER — ROPIVACAINE HYDROCHLORIDE 2 MG/ML
10 INJECTION, SOLUTION EPIDURAL; INFILTRATION; PERINEURAL ONCE
Status: COMPLETED | OUTPATIENT
Start: 2019-08-26 | End: 2019-08-26

## 2019-08-26 RX ORDER — EPHEDRINE SULFATE 50 MG/ML
5 INJECTION, SOLUTION INTRAMUSCULAR; INTRAVENOUS; SUBCUTANEOUS
Status: DISCONTINUED | OUTPATIENT
Start: 2019-08-26 | End: 2019-08-29

## 2019-08-26 RX ORDER — BUPIVACAINE HYDROCHLORIDE 2.5 MG/ML
INJECTION, SOLUTION EPIDURAL; INFILTRATION; INTRACAUDAL PRN
Status: DISCONTINUED | OUTPATIENT
Start: 2019-08-26 | End: 2019-08-27 | Stop reason: HOSPADM

## 2019-08-26 RX ORDER — OXYTOCIN/0.9 % SODIUM CHLORIDE 30/500 ML
1-24 PLASTIC BAG, INJECTION (ML) INTRAVENOUS CONTINUOUS
Status: DISCONTINUED | OUTPATIENT
Start: 2019-08-26 | End: 2019-08-27 | Stop reason: CLARIF

## 2019-08-26 RX ORDER — BUPIVACAINE HYDROCHLORIDE 2.5 MG/ML
INJECTION, SOLUTION EPIDURAL; INFILTRATION; INTRACAUDAL
Status: COMPLETED
Start: 2019-08-26 | End: 2019-08-26

## 2019-08-26 RX ORDER — LIDOCAINE 40 MG/G
CREAM TOPICAL
Status: DISCONTINUED | OUTPATIENT
Start: 2019-08-26 | End: 2019-08-27 | Stop reason: CLARIF

## 2019-08-26 RX ORDER — NALBUPHINE HYDROCHLORIDE 10 MG/ML
2.5-5 INJECTION, SOLUTION INTRAMUSCULAR; INTRAVENOUS; SUBCUTANEOUS EVERY 6 HOURS PRN
Status: DISCONTINUED | OUTPATIENT
Start: 2019-08-26 | End: 2019-08-30 | Stop reason: HOSPADM

## 2019-08-26 RX ORDER — GENTAMICIN SULFATE 60 MG/50ML
1.5 INJECTION, SOLUTION INTRAVENOUS EVERY 8 HOURS
Status: DISCONTINUED | OUTPATIENT
Start: 2019-08-27 | End: 2019-08-27 | Stop reason: CLARIF

## 2019-08-26 RX ORDER — ACETAMINOPHEN 325 MG/1
975 TABLET ORAL EVERY 6 HOURS
Status: DISCONTINUED | OUTPATIENT
Start: 2019-08-26 | End: 2019-08-27 | Stop reason: CLARIF

## 2019-08-26 RX ORDER — NALOXONE HYDROCHLORIDE 0.4 MG/ML
.1-.4 INJECTION, SOLUTION INTRAMUSCULAR; INTRAVENOUS; SUBCUTANEOUS
Status: DISCONTINUED | OUTPATIENT
Start: 2019-08-26 | End: 2019-08-30 | Stop reason: HOSPADM

## 2019-08-26 RX ORDER — AMPICILLIN 2 G/1
2 INJECTION, POWDER, FOR SOLUTION INTRAVENOUS EVERY 6 HOURS
Status: DISCONTINUED | OUTPATIENT
Start: 2019-08-26 | End: 2019-08-27 | Stop reason: CLARIF

## 2019-08-26 RX ADMIN — Medication 2 MILLI-UNITS/MIN: at 10:21

## 2019-08-26 RX ADMIN — BUPIVACAINE HYDROCHLORIDE 5 ML: 2.5 INJECTION, SOLUTION EPIDURAL; INFILTRATION; INTRACAUDAL at 22:41

## 2019-08-26 RX ADMIN — AMPICILLIN SODIUM 2 G: 2 INJECTION, POWDER, FOR SOLUTION INTRAMUSCULAR; INTRAVENOUS at 23:54

## 2019-08-26 RX ADMIN — SODIUM CHLORIDE, POTASSIUM CHLORIDE, SODIUM LACTATE AND CALCIUM CHLORIDE: 600; 310; 30; 20 INJECTION, SOLUTION INTRAVENOUS at 08:44

## 2019-08-26 RX ADMIN — ACETAMINOPHEN 975 MG: 325 TABLET, FILM COATED ORAL at 23:51

## 2019-08-26 RX ADMIN — SODIUM CHLORIDE, POTASSIUM CHLORIDE, SODIUM LACTATE AND CALCIUM CHLORIDE: 600; 310; 30; 20 INJECTION, SOLUTION INTRAVENOUS at 03:59

## 2019-08-26 RX ADMIN — LIDOCAINE HYDROCHLORIDE AND EPINEPHRINE 3 ML: 15; 5 INJECTION, SOLUTION EPIDURAL at 12:40

## 2019-08-26 RX ADMIN — ROPIVACAINE HYDROCHLORIDE 10 ML: 2 INJECTION, SOLUTION EPIDURAL; INFILTRATION at 12:47

## 2019-08-26 RX ADMIN — Medication 12 ML/HR: at 13:16

## 2019-08-26 RX ADMIN — SODIUM CHLORIDE, POTASSIUM CHLORIDE, SODIUM LACTATE AND CALCIUM CHLORIDE: 600; 310; 30; 20 INJECTION, SOLUTION INTRAVENOUS at 13:16

## 2019-08-26 ASSESSMENT — LIFESTYLE VARIABLES: TOBACCO_USE: 0

## 2019-08-26 ASSESSMENT — MIFFLIN-ST. JEOR: SCORE: 1477.51

## 2019-08-26 NOTE — H&P
CC: Late term IOL  HPI: 27yo  at 41 4/7 GA c EDC 8/15/19 who presents for a late term IOL.  No CTX, VB, LOF, nor decreased FM.  No recent illnesses.  Cervix 1.5cm per nursing at admission last night     PNI: 1. Asthma (Advair, Albuterol PRN)  PNL: O+, Rub imm, GBS neg, Panorama WNL, surprise gender, otherwise WNL  PNC: Reg visits since the 1st Tri, Normotensive, neg Duplex of LE for 3+ edema, TWG 25lb    PObHx: Primigravida  PGynHx: Reg menses, no STDs, no Abn Paps  PMHx: Asthma, low resting heart rate  PSHx: ACL repair, oral surgery  Meds: Advair, Albuterol MDI, PNV  Allergies: NKDA  PSocHx: No Tob, EtOH, drugs; ICU nurse at [employer]   PFamHx: Family history of Von [place]brands but her personal testing was negative    PE: Max 136/86, 98, RR 16, 97% RA  Comfortable; Cervadil in situ; /-2, AROM scant clear;   Fetal Cat 1, Irregular CTX      Assessment: 27yo  at late term for induction of labor.  Plan: s/p Cervadil; s/p AROM.  Recheck in 2 hours, if <3cm, then start oxytocin.  Pain control PRN.  Anticipate

## 2019-08-26 NOTE — ANESTHESIA PREPROCEDURE EVALUATION
Anesthesia Pre-Procedure Evaluation    Patient: Cindi Padilla   MRN: [id number] : 1990          Preoperative Diagnosis: * No surgery found *        Past Medical History:   Diagnosis Date     Asthma     Lifelong, currently stable.  No inhaler use for 3months, and infrequent use during preg (19)     NO ACTIVE PROBLEMS      Past Surgical History:   Procedure Laterality Date     C REPAIR CRUCIATE LIGAMENT,KNEE Right     No current issues     LASIK BILATERAL Bilateral 2016     wisdom teeth         Anesthesia Evaluation     . Pt has had prior anesthetic.     No history of anesthetic complications          ROS/MED HX    ENT/Pulmonary:     (+)asthma , . .   (-) tobacco use and sleep apnea   Neurologic:       Cardiovascular:         METS/Exercise Tolerance:     Hematologic:         Musculoskeletal:         GI/Hepatic:        (-) GERD   Renal/Genitourinary:         Endo:         Psychiatric:         Infectious Disease:         Malignancy:         Other:                          Physical Exam  Normal systems: dental    Airway   Mallampati: II  TM distance: >3 FB  Neck ROM: full    Dental     Cardiovascular   Rhythm and rate: regular and normal      Pulmonary    breath sounds clear to auscultation            Lab Results   Component Value Date    WBC 7.7 2018    HGB 13.9 2018    HCT 39.0 2018     2018     2018    POTASSIUM 3.9 2018    CHLORIDE 108 2018    CO2 26 2018    BUN 18 2018    CR 1.02 2018    GLC 84 2018    CHRIS 8.5 2018       Preop Vitals  BP Readings from Last 3 Encounters:   19 100/57   19 118/64   18 98/70    Pulse Readings from Last 3 Encounters:   19 67   19 98   18 65      Resp Readings from Last 3 Encounters:   19 16   18 16   17 16    SpO2 Readings from Last 3 Encounters:   19 93%   19 100%   18 98%      Temp Readings from Last 1 Encounters:    08/26/19 36.4  C (97.6  F) (Temporal)    Ht Readings from Last 1 Encounters:   08/26/19 1.524 m (5')      Wt Readings from Last 1 Encounters:   08/26/19 82.1 kg (181 lb)    Estimated body mass index is 35.35 kg/m  as calculated from the following:    Height as of this encounter: 1.524 m (5').    Weight as of this encounter: 82.1 kg (181 lb).       Anesthesia Plan      History & Physical Review  History and physical reviewed and following examination; no interval change.    ASA Status:  2 .        Plan for Epidural          Postoperative Care  Postoperative pain management:  Neuraxial analgesia.      Consents  Anesthetic plan, risks, benefits and alternatives discussed with:  Patient..                 Tova Chaney

## 2019-08-26 NOTE — PLAN OF CARE
Pt arrived in stable condition for scheduled induction.  Report to Dr EWA Phoenix, who ordered cervidil overnight.  Plan of care discussed with pt and , questions answered, birth wishes reviewed.

## 2019-08-26 NOTE — PLAN OF CARE
Cervix unchanged from last exam 2 hours ago, patient radha but not feeling much other then some cramping.  Dr Phoenix updated via pager.  Will start oxytocin as discussed.

## 2019-08-26 NOTE — PROVIDER NOTIFICATION
08/26/19 1635   Provider Notification   Provider Name/Title Dr Phoenix   Method of Notification Phone   Notification Reason Status Update;SVE;Labor Status   Dr Phoenix phoned in for status update. Requests SVE and status update at 1900. Will cont to monitor per POC.

## 2019-08-26 NOTE — ANESTHESIA PROCEDURE NOTES
Peripheral nerve/Neuraxial procedure note : epidural catheter  Pre-Procedure  Performed by Tova Chaney  Location: OB      Pre-Anesthestic Checklist: patient identified, IV checked, risks and benefits discussed, informed consent and pre-op evaluation    Timeout  Correct Patient: Yes   Correct Procedure: Yes   Correct Site: Yes   Correct Laterality: N/A   Correct Position: Yes   Site Marked: N/A   .   Procedure Documentation    .    Procedure:    Epidural catheter.  Insertion Site:L3-4  (midline approach) Injection technique: LORT saline   Local skin infiltrated with mL of 1% lidocaine.  ARIANE at 5 cm     Patient Prep;mask, sterile gloves, povidone-iodine 7.5% surgical scrub, patient draped.  .  Needle: Touhy needle Needle Gauge: 17.    Needle Length (Inches) 3.5  # of attempts: 1 and # of redirects:  .   . .  Catheter threaded easily  5 cm epidural space.  .   .    Assessment/Narrative  Paresthesias: No.  .  .  Aspiration negative for heme or CSF  . Test dose of 3 mL lidocaine 1.5% w/ 1:200,000 epinephrine at. Test dose negative for signs of intravascular, subdural or intrathecal injection.

## 2019-08-27 ENCOUNTER — ANESTHESIA (OUTPATIENT)
Dept: OBGYN | Facility: CLINIC | Age: 29
End: 2019-08-27
Payer: COMMERCIAL

## 2019-08-27 ENCOUNTER — ANESTHESIA EVENT (OUTPATIENT)
Dept: OBGYN | Facility: CLINIC | Age: 29
End: 2019-08-27
Payer: COMMERCIAL

## 2019-08-27 LAB
ABO + RH BLD: NORMAL
ABO + RH BLD: NORMAL
ERYTHROCYTE [DISTWIDTH] IN BLOOD BY AUTOMATED COUNT: 12.6 % (ref 10–15)
HCT VFR BLD AUTO: 35.6 % (ref 35–47)
HGB BLD-MCNC: 12.2 G/DL (ref 11.7–15.7)
MCH RBC QN AUTO: 30.9 PG (ref 26.5–33)
MCHC RBC AUTO-ENTMCNC: 34.3 G/DL (ref 31.5–36.5)
MCV RBC AUTO: 90 FL (ref 78–100)
PLATELET # BLD AUTO: 176 10E9/L (ref 150–450)
RBC # BLD AUTO: 3.95 10E12/L (ref 3.8–5.2)
SPECIMEN EXP DATE BLD: NORMAL
T PALLIDUM AB SER QL: NONREACTIVE
WBC # BLD AUTO: 18.3 10E9/L (ref 4–11)

## 2019-08-27 PROCEDURE — 71000013 ZZH RECOVERY PHASE 1 LEVEL 1 EA ADDTL HR: Performed by: OBSTETRICS & GYNECOLOGY

## 2019-08-27 PROCEDURE — 36000058 ZZH SURGERY LEVEL 3 EA 15 ADDTL MIN: Performed by: OBSTETRICS & GYNECOLOGY

## 2019-08-27 PROCEDURE — 36415 COLL VENOUS BLD VENIPUNCTURE: CPT | Performed by: PHYSICIAN ASSISTANT

## 2019-08-27 PROCEDURE — 86780 TREPONEMA PALLIDUM: CPT | Performed by: PHYSICIAN ASSISTANT

## 2019-08-27 PROCEDURE — 88307 TISSUE EXAM BY PATHOLOGIST: CPT | Performed by: OBSTETRICS & GYNECOLOGY

## 2019-08-27 PROCEDURE — 88307 TISSUE EXAM BY PATHOLOGIST: CPT | Mod: 26 | Performed by: OBSTETRICS & GYNECOLOGY

## 2019-08-27 PROCEDURE — 36000056 ZZH SURGERY LEVEL 3 1ST 30 MIN: Performed by: OBSTETRICS & GYNECOLOGY

## 2019-08-27 PROCEDURE — 25000128 H RX IP 250 OP 636

## 2019-08-27 PROCEDURE — 36415 COLL VENOUS BLD VENIPUNCTURE: CPT | Performed by: OBSTETRICS & GYNECOLOGY

## 2019-08-27 PROCEDURE — 37000008 ZZH ANESTHESIA TECHNICAL FEE, 1ST 30 MIN: Performed by: OBSTETRICS & GYNECOLOGY

## 2019-08-27 PROCEDURE — 86901 BLOOD TYPING SEROLOGIC RH(D): CPT | Performed by: PHYSICIAN ASSISTANT

## 2019-08-27 PROCEDURE — 37000009 ZZH ANESTHESIA TECHNICAL FEE, EACH ADDTL 15 MIN: Performed by: OBSTETRICS & GYNECOLOGY

## 2019-08-27 PROCEDURE — 86900 BLOOD TYPING SEROLOGIC ABO: CPT | Performed by: PHYSICIAN ASSISTANT

## 2019-08-27 PROCEDURE — 71000012 ZZH RECOVERY PHASE 1 LEVEL 1 FIRST HR: Performed by: OBSTETRICS & GYNECOLOGY

## 2019-08-27 PROCEDURE — 25800030 ZZH RX IP 258 OP 636: Performed by: NURSE ANESTHETIST, CERTIFIED REGISTERED

## 2019-08-27 PROCEDURE — 85027 COMPLETE CBC AUTOMATED: CPT | Performed by: OBSTETRICS & GYNECOLOGY

## 2019-08-27 PROCEDURE — 25000125 ZZHC RX 250: Performed by: OBSTETRICS & GYNECOLOGY

## 2019-08-27 PROCEDURE — 12000035 ZZH R&B POSTPARTUM

## 2019-08-27 PROCEDURE — 25000132 ZZH RX MED GY IP 250 OP 250 PS 637: Performed by: OBSTETRICS & GYNECOLOGY

## 2019-08-27 PROCEDURE — 25000125 ZZHC RX 250: Performed by: NURSE ANESTHETIST, CERTIFIED REGISTERED

## 2019-08-27 PROCEDURE — 25000128 H RX IP 250 OP 636: Performed by: OBSTETRICS & GYNECOLOGY

## 2019-08-27 PROCEDURE — 25000128 H RX IP 250 OP 636: Performed by: ANESTHESIOLOGY

## 2019-08-27 PROCEDURE — 25800030 ZZH RX IP 258 OP 636: Performed by: OBSTETRICS & GYNECOLOGY

## 2019-08-27 PROCEDURE — 25000128 H RX IP 250 OP 636: Performed by: NURSE ANESTHETIST, CERTIFIED REGISTERED

## 2019-08-27 PROCEDURE — 27210794 ZZH OR GENERAL SUPPLY STERILE: Performed by: OBSTETRICS & GYNECOLOGY

## 2019-08-27 PROCEDURE — 25000125 ZZHC RX 250: Performed by: ANESTHESIOLOGY

## 2019-08-27 RX ORDER — IBUPROFEN 400 MG/1
800 TABLET, FILM COATED ORAL EVERY 6 HOURS PRN
Status: DISCONTINUED | OUTPATIENT
Start: 2019-08-27 | End: 2019-08-30 | Stop reason: HOSPADM

## 2019-08-27 RX ORDER — ONDANSETRON 2 MG/ML
INJECTION INTRAMUSCULAR; INTRAVENOUS PRN
Status: DISCONTINUED | OUTPATIENT
Start: 2019-08-27 | End: 2019-08-27

## 2019-08-27 RX ORDER — OXYTOCIN/0.9 % SODIUM CHLORIDE 30/500 ML
1-24 PLASTIC BAG, INJECTION (ML) INTRAVENOUS CONTINUOUS
Status: DISCONTINUED | OUTPATIENT
Start: 2019-08-27 | End: 2019-08-30 | Stop reason: HOSPADM

## 2019-08-27 RX ORDER — ONDANSETRON 2 MG/ML
4 INJECTION INTRAMUSCULAR; INTRAVENOUS EVERY 6 HOURS PRN
Status: DISCONTINUED | OUTPATIENT
Start: 2019-08-27 | End: 2019-08-30 | Stop reason: HOSPADM

## 2019-08-27 RX ORDER — AZITHROMYCIN 500 MG/1
500 INJECTION, POWDER, LYOPHILIZED, FOR SOLUTION INTRAVENOUS
Status: COMPLETED | OUTPATIENT
Start: 2019-08-27 | End: 2019-08-27

## 2019-08-27 RX ORDER — METHYLERGONOVINE MALEATE 0.2 MG/ML
200 INJECTION INTRAVENOUS
Status: DISCONTINUED | OUTPATIENT
Start: 2019-08-27 | End: 2019-08-30 | Stop reason: HOSPADM

## 2019-08-27 RX ORDER — CEFAZOLIN SODIUM 2 G/100ML
2 INJECTION, SOLUTION INTRAVENOUS
Status: DISCONTINUED | OUTPATIENT
Start: 2019-08-27 | End: 2019-08-27 | Stop reason: HOSPADM

## 2019-08-27 RX ORDER — SIMETHICONE 80 MG
80 TABLET,CHEWABLE ORAL 4 TIMES DAILY PRN
Status: DISCONTINUED | OUTPATIENT
Start: 2019-08-27 | End: 2019-08-30 | Stop reason: HOSPADM

## 2019-08-27 RX ORDER — HYDROCORTISONE 2.5 %
CREAM (GRAM) TOPICAL 3 TIMES DAILY PRN
Status: DISCONTINUED | OUTPATIENT
Start: 2019-08-27 | End: 2019-08-30 | Stop reason: HOSPADM

## 2019-08-27 RX ORDER — OXYTOCIN/0.9 % SODIUM CHLORIDE 30/500 ML
100 PLASTIC BAG, INJECTION (ML) INTRAVENOUS CONTINUOUS
Status: DISCONTINUED | OUTPATIENT
Start: 2019-08-27 | End: 2019-08-30 | Stop reason: HOSPADM

## 2019-08-27 RX ORDER — KETOROLAC TROMETHAMINE 30 MG/ML
30 INJECTION, SOLUTION INTRAMUSCULAR; INTRAVENOUS EVERY 6 HOURS
Status: DISPENSED | OUTPATIENT
Start: 2019-08-27 | End: 2019-08-28

## 2019-08-27 RX ORDER — MORPHINE SULFATE 1 MG/ML
INJECTION, SOLUTION EPIDURAL; INTRATHECAL; INTRAVENOUS
Status: DISCONTINUED
Start: 2019-08-27 | End: 2019-08-27 | Stop reason: HOSPADM

## 2019-08-27 RX ORDER — METHYLERGONOVINE MALEATE 0.2 MG/ML
200 INJECTION INTRAVENOUS
Status: DISCONTINUED | OUTPATIENT
Start: 2019-08-27 | End: 2019-08-27

## 2019-08-27 RX ORDER — ACETAMINOPHEN 325 MG/1
975 TABLET ORAL EVERY 8 HOURS
Status: DISPENSED | OUTPATIENT
Start: 2019-08-27 | End: 2019-08-29

## 2019-08-27 RX ORDER — CEFAZOLIN SODIUM 1 G/3ML
1 INJECTION, POWDER, FOR SOLUTION INTRAMUSCULAR; INTRAVENOUS SEE ADMIN INSTRUCTIONS
Status: DISCONTINUED | OUTPATIENT
Start: 2019-08-27 | End: 2019-08-27 | Stop reason: HOSPADM

## 2019-08-27 RX ORDER — ONDANSETRON 2 MG/ML
4 INJECTION INTRAMUSCULAR; INTRAVENOUS EVERY 6 HOURS PRN
Status: DISCONTINUED | OUTPATIENT
Start: 2019-08-27 | End: 2019-08-27

## 2019-08-27 RX ORDER — MISOPROSTOL 200 UG/1
800 TABLET ORAL
Status: DISCONTINUED | OUTPATIENT
Start: 2019-08-27 | End: 2019-08-30 | Stop reason: HOSPADM

## 2019-08-27 RX ORDER — DEXTROSE, SODIUM CHLORIDE, SODIUM LACTATE, POTASSIUM CHLORIDE, AND CALCIUM CHLORIDE 5; .6; .31; .03; .02 G/100ML; G/100ML; G/100ML; G/100ML; G/100ML
INJECTION, SOLUTION INTRAVENOUS CONTINUOUS
Status: DISCONTINUED | OUTPATIENT
Start: 2019-08-27 | End: 2019-08-30 | Stop reason: HOSPADM

## 2019-08-27 RX ORDER — SODIUM CHLORIDE, SODIUM LACTATE, POTASSIUM CHLORIDE, CALCIUM CHLORIDE 600; 310; 30; 20 MG/100ML; MG/100ML; MG/100ML; MG/100ML
INJECTION, SOLUTION INTRAVENOUS CONTINUOUS
Status: DISCONTINUED | OUTPATIENT
Start: 2019-08-27 | End: 2019-08-27 | Stop reason: HOSPADM

## 2019-08-27 RX ORDER — LIDOCAINE 40 MG/G
CREAM TOPICAL
Status: DISCONTINUED | OUTPATIENT
Start: 2019-08-27 | End: 2019-08-27

## 2019-08-27 RX ORDER — DOCUSATE SODIUM 100 MG/1
100 CAPSULE, LIQUID FILLED ORAL 2 TIMES DAILY
Status: DISCONTINUED | OUTPATIENT
Start: 2019-08-27 | End: 2019-08-30 | Stop reason: HOSPADM

## 2019-08-27 RX ORDER — MISOPROSTOL 100 UG/1
25 TABLET ORAL EVERY 4 HOURS PRN
Status: DISCONTINUED | OUTPATIENT
Start: 2019-08-27 | End: 2019-08-29

## 2019-08-27 RX ORDER — CARBOPROST TROMETHAMINE 250 UG/ML
250 INJECTION, SOLUTION INTRAMUSCULAR
Status: DISCONTINUED | OUTPATIENT
Start: 2019-08-27 | End: 2019-08-29

## 2019-08-27 RX ORDER — OXYTOCIN 10 [USP'U]/ML
10 INJECTION, SOLUTION INTRAMUSCULAR; INTRAVENOUS
Status: DISCONTINUED | OUTPATIENT
Start: 2019-08-27 | End: 2019-08-27

## 2019-08-27 RX ORDER — OXYTOCIN 10 [USP'U]/ML
10 INJECTION, SOLUTION INTRAMUSCULAR; INTRAVENOUS
Status: DISCONTINUED | OUTPATIENT
Start: 2019-08-27 | End: 2019-08-30 | Stop reason: HOSPADM

## 2019-08-27 RX ORDER — FENTANYL CITRATE 50 UG/ML
INJECTION, SOLUTION INTRAMUSCULAR; INTRAVENOUS
Status: DISCONTINUED
Start: 2019-08-27 | End: 2019-08-27 | Stop reason: HOSPADM

## 2019-08-27 RX ORDER — ACETAMINOPHEN 325 MG/1
650 TABLET ORAL EVERY 4 HOURS PRN
Status: DISCONTINUED | OUTPATIENT
Start: 2019-08-27 | End: 2019-08-29

## 2019-08-27 RX ORDER — NALOXONE HYDROCHLORIDE 0.4 MG/ML
.1-.4 INJECTION, SOLUTION INTRAMUSCULAR; INTRAVENOUS; SUBCUTANEOUS
Status: DISCONTINUED | OUTPATIENT
Start: 2019-08-27 | End: 2019-08-27

## 2019-08-27 RX ORDER — METOCLOPRAMIDE HYDROCHLORIDE 5 MG/ML
10 INJECTION INTRAMUSCULAR; INTRAVENOUS EVERY 6 HOURS PRN
Status: DISCONTINUED | OUTPATIENT
Start: 2019-08-27 | End: 2019-08-30 | Stop reason: HOSPADM

## 2019-08-27 RX ORDER — ZOLPIDEM TARTRATE 5 MG/1
5 TABLET ORAL
Status: DISCONTINUED | OUTPATIENT
Start: 2019-08-27 | End: 2019-08-30 | Stop reason: HOSPADM

## 2019-08-27 RX ORDER — LIDOCAINE HCL/EPINEPHRINE/PF 2%-1:200K
VIAL (ML) INJECTION PRN
Status: DISCONTINUED | OUTPATIENT
Start: 2019-08-27 | End: 2019-08-27

## 2019-08-27 RX ORDER — PROCHLORPERAZINE 25 MG
25 SUPPOSITORY, RECTAL RECTAL EVERY 12 HOURS PRN
Status: DISCONTINUED | OUTPATIENT
Start: 2019-08-27 | End: 2019-08-30 | Stop reason: HOSPADM

## 2019-08-27 RX ORDER — LIDOCAINE HCL/EPINEPHRINE/PF 2%-1:200K
VIAL (ML) INJECTION
Status: DISCONTINUED
Start: 2019-08-27 | End: 2019-08-27 | Stop reason: HOSPADM

## 2019-08-27 RX ORDER — IBUPROFEN 400 MG/1
800 TABLET, FILM COATED ORAL
Status: DISCONTINUED | OUTPATIENT
Start: 2019-08-27 | End: 2019-08-27

## 2019-08-27 RX ORDER — OXYTOCIN/0.9 % SODIUM CHLORIDE 30/500 ML
100-340 PLASTIC BAG, INJECTION (ML) INTRAVENOUS CONTINUOUS PRN
Status: DISCONTINUED | OUTPATIENT
Start: 2019-08-27 | End: 2019-08-30 | Stop reason: HOSPADM

## 2019-08-27 RX ORDER — OXYTOCIN/0.9 % SODIUM CHLORIDE 30/500 ML
PLASTIC BAG, INJECTION (ML) INTRAVENOUS
Status: COMPLETED
Start: 2019-08-27 | End: 2019-08-27

## 2019-08-27 RX ORDER — OXYTOCIN/0.9 % SODIUM CHLORIDE 30/500 ML
340 PLASTIC BAG, INJECTION (ML) INTRAVENOUS CONTINUOUS PRN
Status: DISCONTINUED | OUTPATIENT
Start: 2019-08-27 | End: 2019-08-27

## 2019-08-27 RX ORDER — CITRIC ACID/SODIUM CITRATE 334-500MG
30 SOLUTION, ORAL ORAL
Status: COMPLETED | OUTPATIENT
Start: 2019-08-27 | End: 2019-08-27

## 2019-08-27 RX ORDER — OXYTOCIN/0.9 % SODIUM CHLORIDE 30/500 ML
PLASTIC BAG, INJECTION (ML) INTRAVENOUS CONTINUOUS PRN
Status: DISCONTINUED | OUTPATIENT
Start: 2019-08-27 | End: 2019-08-27

## 2019-08-27 RX ORDER — BISACODYL 10 MG
10 SUPPOSITORY, RECTAL RECTAL DAILY PRN
Status: DISCONTINUED | OUTPATIENT
Start: 2019-08-29 | End: 2019-08-30 | Stop reason: HOSPADM

## 2019-08-27 RX ORDER — LANOLIN 100 %
OINTMENT (GRAM) TOPICAL
Status: DISCONTINUED | OUTPATIENT
Start: 2019-08-27 | End: 2019-08-30 | Stop reason: HOSPADM

## 2019-08-27 RX ORDER — KETOROLAC TROMETHAMINE 30 MG/ML
INJECTION, SOLUTION INTRAMUSCULAR; INTRAVENOUS
Status: COMPLETED
Start: 2019-08-27 | End: 2019-08-27

## 2019-08-27 RX ORDER — HYDROMORPHONE HYDROCHLORIDE 1 MG/ML
.3-.5 INJECTION, SOLUTION INTRAMUSCULAR; INTRAVENOUS; SUBCUTANEOUS EVERY 30 MIN PRN
Status: DISCONTINUED | OUTPATIENT
Start: 2019-08-27 | End: 2019-08-30 | Stop reason: HOSPADM

## 2019-08-27 RX ORDER — ACETAMINOPHEN 325 MG/1
650 TABLET ORAL EVERY 4 HOURS PRN
Status: DISCONTINUED | OUTPATIENT
Start: 2019-08-30 | End: 2019-08-27

## 2019-08-27 RX ORDER — FENTANYL CITRATE 50 UG/ML
INJECTION, SOLUTION INTRAMUSCULAR; INTRAVENOUS PRN
Status: DISCONTINUED | OUTPATIENT
Start: 2019-08-27 | End: 2019-08-27

## 2019-08-27 RX ORDER — SODIUM CHLORIDE, SODIUM LACTATE, POTASSIUM CHLORIDE, CALCIUM CHLORIDE 600; 310; 30; 20 MG/100ML; MG/100ML; MG/100ML; MG/100ML
INJECTION, SOLUTION INTRAVENOUS CONTINUOUS
Status: DISCONTINUED | OUTPATIENT
Start: 2019-08-27 | End: 2019-08-27

## 2019-08-27 RX ORDER — OXYCODONE HYDROCHLORIDE 5 MG/1
5-10 TABLET ORAL
Status: DISCONTINUED | OUTPATIENT
Start: 2019-08-27 | End: 2019-08-30 | Stop reason: HOSPADM

## 2019-08-27 RX ORDER — OXYCODONE AND ACETAMINOPHEN 5; 325 MG/1; MG/1
1 TABLET ORAL
Status: DISCONTINUED | OUTPATIENT
Start: 2019-08-27 | End: 2019-08-27

## 2019-08-27 RX ADMIN — DOCUSATE SODIUM 100 MG: 100 CAPSULE, LIQUID FILLED ORAL at 19:53

## 2019-08-27 RX ADMIN — Medication 0.5 MEQ: at 01:02

## 2019-08-27 RX ADMIN — KETOROLAC TROMETHAMINE 30 MG: 30 INJECTION, SOLUTION INTRAMUSCULAR at 02:41

## 2019-08-27 RX ADMIN — Medication 340 ML/HR: at 01:21

## 2019-08-27 RX ADMIN — DOCUSATE SODIUM 100 MG: 100 CAPSULE, LIQUID FILLED ORAL at 08:36

## 2019-08-27 RX ADMIN — ONDANSETRON 4 MG: 2 INJECTION INTRAMUSCULAR; INTRAVENOUS at 01:05

## 2019-08-27 RX ADMIN — KETOROLAC TROMETHAMINE 30 MG: 30 INJECTION, SOLUTION INTRAMUSCULAR at 13:55

## 2019-08-27 RX ADMIN — HYDROMORPHONE HYDROCHLORIDE 0.3 MG: 1 INJECTION, SOLUTION INTRAMUSCULAR; INTRAVENOUS; SUBCUTANEOUS at 05:30

## 2019-08-27 RX ADMIN — LIDOCAINE HYDROCHLORIDE,EPINEPHRINE BITARTRATE 4.5 ML: 20; .005 INJECTION, SOLUTION EPIDURAL; INFILTRATION; INTRACAUDAL; PERINEURAL at 01:04

## 2019-08-27 RX ADMIN — PHENYLEPHRINE HYDROCHLORIDE 0.5 MCG/KG/MIN: 10 INJECTION INTRAVENOUS at 01:07

## 2019-08-27 RX ADMIN — Medication 100 ML/HR: at 02:59

## 2019-08-27 RX ADMIN — LIDOCAINE HYDROCHLORIDE,EPINEPHRINE BITARTRATE 4.5 ML: 20; .005 INJECTION, SOLUTION EPIDURAL; INFILTRATION; INTRACAUDAL; PERINEURAL at 01:02

## 2019-08-27 RX ADMIN — ENOXAPARIN SODIUM 40 MG: 40 INJECTION SUBCUTANEOUS at 19:53

## 2019-08-27 RX ADMIN — Medication 0.5 MEQ: at 01:04

## 2019-08-27 RX ADMIN — SODIUM CHLORIDE, SODIUM LACTATE, POTASSIUM CHLORIDE, CALCIUM CHLORIDE AND DEXTROSE MONOHYDRATE: 5; 600; 310; 30; 20 INJECTION, SOLUTION INTRAVENOUS at 08:07

## 2019-08-27 RX ADMIN — KETOROLAC TROMETHAMINE 30 MG: 30 INJECTION, SOLUTION INTRAMUSCULAR at 08:36

## 2019-08-27 RX ADMIN — ACETAMINOPHEN 975 MG: 325 TABLET ORAL at 13:55

## 2019-08-27 RX ADMIN — FENTANYL CITRATE 100 MCG: 50 INJECTION, SOLUTION INTRAMUSCULAR; INTRAVENOUS at 00:58

## 2019-08-27 RX ADMIN — IBUPROFEN 800 MG: 400 TABLET ORAL at 20:11

## 2019-08-27 RX ADMIN — Medication 0.5 MEQ: at 00:58

## 2019-08-27 RX ADMIN — ACETAMINOPHEN 975 MG: 325 TABLET ORAL at 06:14

## 2019-08-27 RX ADMIN — SODIUM CHLORIDE, POTASSIUM CHLORIDE, SODIUM LACTATE AND CALCIUM CHLORIDE: 600; 310; 30; 20 INJECTION, SOLUTION INTRAVENOUS at 01:41

## 2019-08-27 RX ADMIN — GENTAMICIN SULFATE 160 MG: 40 INJECTION, SOLUTION INTRAMUSCULAR; INTRAVENOUS at 00:18

## 2019-08-27 RX ADMIN — SODIUM CITRATE AND CITRIC ACID MONOHYDRATE 30 ML: 500; 334 SOLUTION ORAL at 00:44

## 2019-08-27 RX ADMIN — ACETAMINOPHEN 975 MG: 325 TABLET ORAL at 22:26

## 2019-08-27 RX ADMIN — MORPHINE SULFATE 3 MG: 10 INJECTION, SOLUTION INTRAMUSCULAR; INTRAVENOUS at 01:29

## 2019-08-27 RX ADMIN — LIDOCAINE HYDROCHLORIDE,EPINEPHRINE BITARTRATE 4.5 ML: 20; .005 INJECTION, SOLUTION EPIDURAL; INFILTRATION; INTRACAUDAL; PERINEURAL at 00:58

## 2019-08-27 RX ADMIN — AZITHROMYCIN MONOHYDRATE 500 MG: 500 INJECTION, POWDER, LYOPHILIZED, FOR SOLUTION INTRAVENOUS at 01:09

## 2019-08-27 RX ADMIN — SODIUM CHLORIDE, POTASSIUM CHLORIDE, SODIUM LACTATE AND CALCIUM CHLORIDE: 600; 310; 30; 20 INJECTION, SOLUTION INTRAVENOUS at 01:01

## 2019-08-27 ASSESSMENT — LIFESTYLE VARIABLES: TOBACCO_USE: 0

## 2019-08-27 NOTE — PLAN OF CARE
Admitted from at 0515. Mother and father oriented to room and educated about safety procedures. VSS, plan to pump and feed infant donor milk in NICU, donor milk consent signed, incision dressing marked- no change, Tylenol  and Diluadid given for pain, states satisfaction with pain management, brock draining adequate amount of urine,  at bedside, encouraged to call with questions or concerns, will continue to monitor.

## 2019-08-27 NOTE — PROGRESS NOTES
Pt comfortable with epidural, feeling pressure c CTX.  Pushing since .  Fetal Cat 2, Tachycardia, recurrent variable decelerations, mod bianca  [place]olckow q2  SVE 10/100/0, caput to +1, bloody show, warmth on exam  Maternal fever to 101 (previous 100.4)  Maternal   Oxytocin at 10u    Assessment: Stage 2 labor with developing chorio amnionitis and concern for arrest of descent.    Start Amp/Gent for chorio coverage.  Cont active pushing.  If fetal station does not change in 1-2 hours or fetal status worsens, then proceed to delivery by  section.

## 2019-08-27 NOTE — PROVIDER NOTIFICATION
08/27/19 0030   Second Stage of Labor   Provider Location provider at bedside   Dr Phoenix at bedside discussing fetal tachycardia and failure to descend. Decision for c/s made. Consent reviewed and signed by pt. Questions encouraged and answered. Pt prepped for surgery.

## 2019-08-27 NOTE — PROVIDER NOTIFICATION
08/26/19 1929   Provider Notification   Provider Name/Title Dr Phoenix   Method of Notification Phone   Notification Reason Status Update;SVE    Do you still want IUPC? Keke.  Called back 1930: Dr Phoenix orders: do not place [facility], recheck in 1 hr. Call for delivery.

## 2019-08-27 NOTE — PLAN OF CARE
Assumed care of pt @ 1515. Pt complete  and -1 station @ 2145, started pushing @ 2230 after laboring down due to recurrent variables, early decels, and minimal variability. Dr Phoenix @ pt's bedside @2255 for second stage of labor. FHT noted for tachycardia @ 2310 with minimal variability. Pt pushed for 2 hrs with no significant change in station. Diagnosis of choreo made by MD due to maternal fever, and fetal tachycardia. Decision for C/S discussed and made @ 0030. Pt prepped for surgery and report given to stanford Peng RN. Pt transferred to OR @ 0100. See Delivery note for more information.

## 2019-08-27 NOTE — ANESTHESIA POSTPROCEDURE EVALUATION
Patient: Cindi Padilla    Procedure(s):   SECTION    Diagnosis:pregnancy  Diagnosis Additional Information: No value filed.    Anesthesia Type:  Epidural    Note:  Anesthesia Post Evaluation    Patient location during evaluation: OB PACU  Patient participation: Able to participate in evaluation but full recovery from regional anesthesia has not yet ocurrred but is anticipated to occur within 48 hours  Level of consciousness: awake and alert  Pain management: adequate  Airway patency: patent  Cardiovascular status: acceptable and hemodynamically stable  Respiratory status: acceptable, unassisted and nonlabored ventilation  Hydration status: acceptable  PONV: none             Last vitals:  Vitals:    19 0235 19 0250 19 0305   BP: 104/66 110/61 112/62   Pulse:      Resp: 16 16 16   Temp:   36.8  C (98.3  F)   SpO2: 92% 95% 94%         Electronically Signed By: Bhupendra Shepherd MD  2019  3:22 AM

## 2019-08-27 NOTE — PLAN OF CARE
Data: Cindi Padilla transferred to [facility] via bed at 0510. Baby in NICU.  Action: Receiving unit notified of transfer: Yes. Patient and family notified of room change. Report given to Abel CORTES RN at 7590. Belongings sent to receiving unit. Accompanied by Registered Nurse. Oriented patient to surroundings. Call light within reach.   Response: Patient tolerated transfer and is stable.

## 2019-08-27 NOTE — ANESTHESIA POSTPROCEDURE EVALUATION
Patient: Cindi Padilla    * No procedures listed *    Diagnosis:* No pre-op diagnosis entered *  Diagnosis Additional Information: No value filed.    Anesthesia Type:  MILTON    Note:  Anesthesia Post Evaluation    Patient location during evaluation: OB PACU  Patient participation: Able to fully participate in evaluation  Level of consciousness: awake and alert  Pain management: adequate  Airway patency: patent  Cardiovascular status: acceptable and hemodynamically stable  Respiratory status: acceptable, nonlabored ventilation and unassisted  Hydration status: acceptable  PONV: none       Comments: No apparent epidural-related complications.  Worked well for .          Last vitals:  Vitals:    19 0235 19 0250 19 0305   BP: 104/66 110/61 112/62   Pulse:      Resp: 16 16 16   Temp:   36.8  C (98.3  F)   SpO2: 92% 95% 94%         Electronically Signed By: Bhupendra Shepherd MD  2019  3:22 AM

## 2019-08-27 NOTE — PLAN OF CARE
Pt's PACU recovery went well. VS WNL. Fundus firm 1/U, scant to light flow. Denies pain, Ketoralac given as ordered. Catheter care complete. Tolerating ice water. RN delivered pt to the NICU to visit her infant @ 0400. RN will continue to monitor pt while she's in the NICU.

## 2019-08-27 NOTE — PROVIDER NOTIFICATION
08/26/19 2212   Provider Notification   Provider Name/Title Dr Phoenix   Method of Notification Phone   Notification Reason Status Update;Decels   MD notified of recurrent variable and early decels and minimal variability. Will start pushing, Dr Phoenix on his way to evaluate pt.

## 2019-08-27 NOTE — PLAN OF CARE
At 0845, assisted Pt up to bathroom.  Pt denied feeling any dizziness when up.  Katty cares performed. Pt brushed her teeth.  Pt walked back to bed with stand by assist, tolerated well.  Started Pt pumping.  Pt tolerated toasts well.  Wheeled Pt to see baby in  nursery.  Continues to monitor Pt.

## 2019-08-27 NOTE — PROVIDER NOTIFICATION
08/26/19 2044   Provider Notification   Provider Name/Title Dr Phoenix   Method of Notification Electronic Page   Notification Reason Status Update;SVE   SVE 9.5/90/-1. feeling more pressure. Keke RN  Dr Cervantes phoned in: orders to recheck SVE in 1 hr, if complete Labor down for 1 hr and start pushing. Call MD for delivery or if any complications. Will continue to monitor per POC.

## 2019-08-27 NOTE — ANESTHESIA PREPROCEDURE EVALUATION
Anesthesia Pre-Procedure Evaluation    Patient: Cindi Padilla   MRN: [id number] : 1990          Preoperative Diagnosis: pregnancy    Procedure(s):   SECTION    Past Medical History:   Diagnosis Date     Asthma     Lifelong, currently stable.  No inhaler use for 3months, and infrequent use during preg (19)     NO ACTIVE PROBLEMS      Past Surgical History:   Procedure Laterality Date     C REPAIR CRUCIATE LIGAMENT,KNEE Right     No current issues     LASIK BILATERAL Bilateral 2016     wisdom teeth       No Known Allergies  Social History     Tobacco Use     Smoking status: Never Smoker     Smokeless tobacco: Never Used   Substance Use Topics     Alcohol use: Not Currently     Comment: not while pregnant     Prior to Admission medications    Medication Sig Start Date End Date Taking? Authorizing Provider   Prenatal Multivit-Min-Fe-FA (PRE-SHERON PO)    Yes Reported, Patient   ranitidine (ZANTAC) 150 MG tablet  3/4/19  Yes Reported, Patient   ADVAIR DISKUS 250-50 MCG/DOSE inhaler INHALE ONE PUFF BY MOUTH TWICE A DAY 19   Sherif Bull PA-C   albuterol (PROAIR HFA/PROVENTIL HFA/VENTOLIN HFA) 108 (90 BASE) MCG/ACT Inhaler Inhale 2 puffs into the lungs every 6 hours as needed for shortness of breath / dyspnea 3/7/17   Tonya Ernst PA-C     Current Facility-Administered Medications Ordered in Epic   Medication Dose Route Frequency Last Rate Last Dose     acetaminophen (TYLENOL) tablet 650 mg  650 mg Oral Q4H PRN         acetaminophen (TYLENOL) tablet 975 mg  975 mg Oral Q6H   975 mg at 19 2351     ampicillin (OMNIPEN) 2 g vial to attach to  ml bag  2 g Intravenous Q6H 400 mL/hr at 19 2354 2 g at 19 2354    And     gentamicin (GARAMYCIN) infusion 120 mg  1.5 mg/kg Intravenous Q8H         azithromycin (ZITHROMAX) 500 mg vial to attach to  mL bag  500 mg Intravenous Pre-Op/Pre-procedure x 1 dose         bupivacaine 0.25% PF    PRN   5 mL at  08/26/19 2241     carboprost (HEMABATE) injection 250 mcg  250 mcg Intramuscular Once PRN         ceFAZolin (ANCEF) 1 g vial to attach to  ml bag for ADULT or 50 ml bag for PEDS  1 g Intravenous See Admin Instructions         ceFAZolin (ANCEF) intermittent infusion 2 g in 100 mL dextrose PRE-MIX  2 g Intravenous Pre-Op/Pre-procedure x 1 dose         dinoprostone(CERVIDIL) suppository REMOVAL   Vaginal Once         ePHEDrine injection 5 mg  5 mg Intravenous Q3 Min PRN         fentaNYL (PF) (SUBLIMAZE) 100 MCG/2ML injection             fentaNYL (PF) (SUBLIMAZE) injection  mcg   mcg Intravenous Q1H PRN         fentaNYL (SUBLIMAZE) 2 mcg/mL, ropivacaine (NAROPIN) 0.2% in NaCl 0.9% EPIDURAL infusion   EPIDURAL Continuous 12 mL/hr at 08/26/19 1530 12 mL/hr at 08/26/19 1530     hydrOXYzine (ATARAX) tablet  mg   mg Oral Q6H PRN         ibuprofen (ADVIL/MOTRIN) tablet 800 mg  800 mg Oral Once PRN         lactated ringers BOLUS 1,000 mL  1,000 mL Intravenous Once         lactated ringers BOLUS 1,000 mL  1,000 mL Intravenous Once  mL/hr at 08/26/19 1222      Or     lactated ringers BOLUS 500 mL  500 mL Intravenous Once PRN         lactated ringers BOLUS 250 mL  250 mL Intravenous Once  mL/hr at 08/26/19 2134       lactated ringers infusion   Intravenous Continuous         lactated ringers infusion   Intravenous Continuous 125 mL/hr at 08/26/19 1530       lidocaine (LMX4) cream   Topical Q1H PRN         lidocaine (LMX4) cream   Topical Q1H PRN         lidocaine 1 % 0.1-1 mL  0.1-1 mL Other Q1H PRN         lidocaine 1 % 0.1-1 mL  0.1-1 mL Other Q1H PRN         lidocaine 1 % 0.1-20 mL  0.1-20 mL Subcutaneous Once PRN         lidocaine 2%-EPINEPHrine 1:200,000 2 %-1:160265 injection             lidocaine-EPINEPHrine 1.5 %-1:181204 injection   EPIDURAL PRN   3 mL at 08/26/19 1240     medication instruction   Does not apply Continuous PRN         medication instruction   Does not apply  "Continuous PRN         Medication Instructions: misoprostol (CYTOTEC)- Nurse to discuss ordering with provider, if needed. Ordered via \"OB misoprostol (CYTOTEC) Postpartum Hemorrhage PANEL\"   Does not apply Continuous PRN         methylergonovine (METHERGINE) injection 200 mcg  200 mcg Intramuscular Once PRN         nalbuphine (NUBAIN) injection 2.5-5 mg  2.5-5 mg Intravenous Q6H PRN         naloxone (NARCAN) injection 0.1-0.4 mg  0.1-0.4 mg Intravenous Q2 Min PRN         naloxone (NARCAN) injection 0.1-0.4 mg  0.1-0.4 mg Intravenous Q2 Min PRN         ondansetron (ZOFRAN) injection 4 mg  4 mg Intravenous Q6H PRN         Opioid plan postpartum - medication instruction   Does not apply Continuous PRN         oxyCODONE-acetaminophen (PERCOCET) 5-325 MG per tablet 1 tablet  1 tablet Oral Once PRN         oxytocin (PITOCIN) 30 units in 500 mL 0.9% NaCl infusion  1-24 reema-units/min Intravenous Continuous 10 mL/hr at 08/26/19 1530 10 reema-units/min at 08/26/19 1530     oxytocin (PITOCIN) 30 units in 500 mL 0.9% NaCl infusion  100-340 mL/hr Intravenous Continuous PRN         oxytocin (PITOCIN) injection 10 Units  10 Units Intramuscular Once PRN         sodium bicarbonate 8.4 % injection             sodium chloride (PF) 0.9% PF flush 3 mL  3 mL Intracatheter q1 min prn         sodium chloride (PF) 0.9% PF flush 3 mL  3 mL Intracatheter Q8H         sodium chloride (PF) 0.9% PF flush 3 mL  3 mL Intracatheter q1 min prn         sodium chloride (PF) 0.9% PF flush 3 mL  3 mL Intracatheter Q8H         terbutaline (BRETHINE) injection 0.25 mg  0.25 mg Subcutaneous Once PRN         tranexamic acid (CYKLOKAPRON) Bolus 1g vial attach to NaCl 50 or 100 mL bag ADULT  1 g Intravenous Q30 Min PRN         No current Epic-ordered outpatient medications on file.       fentaNYL-ropivacaine 12 mL/hr (08/26/19 1530)     lactated ringers       lactated ringers 125 mL/hr at 08/26/19 1530     - MEDICATION INSTRUCTIONS -       - MEDICATION " INSTRUCTIONS -       - MEDICATION INSTRUCTIONS -       - MEDICATION INSTRUCTIONS -       oxytocin in 0.9% NaCl 10 reema-units/min (08/26/19 1530)     oxytocin in 0.9% NaCl       Recent Labs   Lab Test 02/25/18 2007      POTASSIUM 3.9   CHLORIDE 108   CO2 26   ANIONGAP 7   GLC 84   BUN 18   CR 1.02   CHRIS 8.5     Recent Labs   Lab Test 02/25/18 2007   WBC 7.7   HGB 13.9        Recent Labs   Lab Test 08/25/19 2053   ABO O   RH Pos     Recent Labs   Lab Test 02/25/18 2007   TROPI <0.015     RECENT LABS:     Anesthesia Evaluation     . Pt has had prior anesthetic. Type: General    No history of anesthetic complications          ROS/MED HX    ENT/Pulmonary:     (+)asthma , . .   (-) tobacco use and sleep apnea   Neurologic:       Cardiovascular:         METS/Exercise Tolerance:     Hematologic:         Musculoskeletal:         GI/Hepatic:        (-) GERD   Renal/Genitourinary:         Endo:         Psychiatric:         Infectious Disease:         Malignancy:         Other:                          Physical Exam  Normal systems: dental    Airway   Mallampati: II  TM distance: >3 FB  Neck ROM: full    Dental     Cardiovascular   Rhythm and rate: regular and normal  (-) no murmur    Pulmonary    breath sounds clear to auscultation(-) no wheezes            Lab Results   Component Value Date    WBC 7.7 02/25/2018    HGB 13.9 02/25/2018    HCT 39.0 02/25/2018     02/25/2018     02/25/2018    POTASSIUM 3.9 02/25/2018    CHLORIDE 108 02/25/2018    CO2 26 02/25/2018    BUN 18 02/25/2018    CR 1.02 02/25/2018    GLC 84 02/25/2018    CHRIS 8.5 02/25/2018       Preop Vitals  BP Readings from Last 3 Encounters:   08/26/19 107/63   03/11/19 118/64   03/05/18 98/70    Pulse Readings from Last 3 Encounters:   08/25/19 67   03/11/19 98   03/05/18 65      Resp Readings from Last 3 Encounters:   08/26/19 16   02/25/18 16   03/07/17 16    SpO2 Readings from Last 3 Encounters:   08/26/19 97%   03/11/19 100%    18 98%      Temp Readings from Last 1 Encounters:   19 38.3  C (101  F)    Ht Readings from Last 1 Encounters:   19 1.524 m (5')      Wt Readings from Last 1 Encounters:   19 82.1 kg (181 lb)    Estimated body mass index is 35.35 kg/m  as calculated from the following:    Height as of this encounter: 1.524 m (5').    Weight as of this encounter: 82.1 kg (181 lb).       Anesthesia Plan      History & Physical Review  History and physical reviewed and following examination, relevant changes include:    ASA Status:  3 .    NPO Status:  > 8 hours    Plan for Epidural   PONV prophylaxis:  Ondansetron (or other 5HT-3)  27 yo Primip in labor and complete - Failure to Descend - in need of .      Bicitra   Epidural DM for postop pain control   Phenylephrine gtt prn         Postoperative Care  Postoperative pain management:  Neuraxial analgesia and Multi-modal analgesia.      Consents  Anesthetic plan, risks, benefits and alternatives discussed with:  Patient and Spouse..                 Bhupendra Shepherd MD

## 2019-08-27 NOTE — ANESTHESIA CARE TRANSFER NOTE
Patient: Cindi Padilla    Procedure(s):   SECTION    Diagnosis: pregnancy  Diagnosis Additional Information: No value filed.    Anesthesia Type:   Epidural     Note:  Airway :Room Air  Patient transferred to:PACU  Comments: Pt exhibits spontaneous respirations, all monitors and alarms on in PACU, VSS, patent IV, report and transfer of care to RN.  Handoff Report: Identifed the Patient, Identified the Reponsible Provider, Reviewed the pertinent medical history, Discussed the surgical course, Reviewed Intra-OP anesthesia mangement and issues during anesthesia, Set expectations for post-procedure period and Allowed opportunity for questions and acknowledgement of understanding      Vitals: (Last set prior to Anesthesia Care Transfer)    CRNA VITALS  2019 0127 - 2019 0206      2019             Resp Rate (set):  10                Electronically Signed By: FLAVIO Christensen CRNA  2019  2:06 AM

## 2019-08-27 NOTE — PROVIDER NOTIFICATION
08/26/19 1910   Provider Notification   Provider Name/Title Dr Phoenix   Method of Notification Phone   Notification Reason Status Update   Dr Cervantes requests to place IUPC and titrated to adequate MVU. Recheck SVE in 2 hrs and notify. Will continue to monitor per POC.

## 2019-08-27 NOTE — OP NOTE
SURGEON: Herminio Phoenix MD  PREOPERATIVE DIAGNOSIS: 1.Single intrauterine pregnancy at 41 5/7  2. Arrest of Descent  3. Chorioamnionitis  POSTOPERATIVE DIAGNOSIS: Same, delivered, Meconium stained fluid, Occiput transverse  OPERATION PERFORMED: Primary low transverse  section  ANESTHESIA: Spinal  EBL: 500 mL  FLUIDS: Lactated Ringers  URINE OUTPUT: blood tinged urine in Farias catheter present prior to  section  DRAINS: Farias catheter  SPECIMENS: Cord blood and placenta  COMPLICATIONS: None  FINDINGS: Viable male infant weighing PENDING grams with APGARs of 8 and 9 at 1 and 5 minutes, respectively. Normal appearing uterus, tubes, and ovaries.  CONDITION: Both mother and infant stable in the immediate postpartum period.   INDICATIONS: 28 year old  at 41 5/7 weeks gestational age who presented for a late term induction of labor.  Her induction was started with cervadil.  After 12 hours, the cervadil was removed and she underwent artificial rupture of membranes for clear fluid.  Oxytocin was started and titrated to 10 units.  She had an epidural for pain control which was working well.  She progressed to complete dilation and pushed for 2 hours without fetal descent from 0 station, albeit increasing caput.  In addition, fetal tachycardia, maternal tachycardia, and maternal fever was present and chorioamnionitis was diagnosed.  She was given a dose of gentamicin and ampicillin per protocol prior to decision to move to  section for both arrest of descent and continued Fetal Cat 2.  OPERATION: Informed consent was obtained for the procedure and the patient was taken to the operating room. Epidural anesthesia was bolused and obtained without difficulty and found to be adequate. The patient was then placed in a dorsal supine position with a left lateral tilt. The patient was prepped and draped in the normal sterile fashion. A perioperative time out was performed. A Pfannenstiel skin incision was  made and carried down to the underlying fascia with the scalpel. The fascia was then nicked in the midline. This was extended laterally with the pickups and Lui scissors. The superior portion of the fascia was grasped with Kocher clamps x2 and elevated off of the underlying rectus muscles both bluntly and sharply with Lui scissors. Attention was then turned to the inferior portion of the fascia which was, in a similar fashion, grasped with Kocher clamps x2 and elevated off of the underlying rectus muscles both bluntly and sharply with Lui scissors. The rectus muscles were then  in the midline. The peritoneum was then entered bluntly and placed on stretch with excellent visualization of the uterus and bladder. A hand was inserted into the abdomen and the uterus was noted to be clear of adhesions. The bladder blade was inserted and the vesicouterine peritoneum was identified, grasped with a Justyna clamp and entered sharply with Metzenbaum scissors. This was then extended laterally and the bladder flap created digitally. The bladder blade was then reinserted. The lower uterine segment was incised in a transverse fashion with the scalpel and the uterus entered and meconium stained fluid found. This was extended bluntly in a superior to inferior fashion. The bladder blade was then removed and the fetal head was elevated to the hysterotomy in an atraumatic fashion. The baby delivered in the right occiput transverse position. The nose and mouth were bulb-suctioned. There was no nuchal cord. The remainder of the body then delivered without incident. The cord was clamped times two and cut and the baby was handed off the awaiting pediatric staff. Cord segment and cord blood were obtained.  The placenta was removed manually. The uterus was exteriorized and cleared of all clots and debris. Attention was turned to the hysterotomy which was then closed with 0 Vicryl in a running, locked fashion. A second layer of the  same suture was used in an imbricating fashion for excellent hemostasis. The posterior cul-de-sac was then inspected and irrigated and cleaned of clot and debris. The hysterotomy inspected and was noted to be hemostatic. The uterus was returned to the abdomen. Bilateral gutters were cleared of all clot and debris. The hysterotomy was reinspected and noted to remain hemostatic. The space of Retzius was noted to be hemostatic. The fascia was then closed with 0 Vicryl in a runniung fashion. Subcutaneous tissue was irrigated and noted to be hemostatic. The subcutaneous tissue was closed with 2-0 plain suture in a running fashion. The skin was closed with a running stitch of 3-0 Monocryl in a subcuticular fashion.  Dermabond was applied.  At the termination of the procedure, fundal pressure was applied and a moderate amount of lochia was expressed. The patient tolerated the procedure well. Needle and sponge counts were correct times two. Two grams of Ancef and Azithromycin were given prior to the start of the procedure.

## 2019-08-27 NOTE — PLAN OF CARE
VSS.  Incision C/D/I.  Up to bathroom with assist. Pt walked in the hallway, tolerated well.  Pain well controlled with toradol, tylenol, and duramorph. Breastfeeding baby well in the nursery. Continue to monitor and notify MD as needed.

## 2019-08-28 LAB — HGB BLD-MCNC: 9.7 G/DL (ref 11.7–15.7)

## 2019-08-28 PROCEDURE — 25000128 H RX IP 250 OP 636: Performed by: OBSTETRICS & GYNECOLOGY

## 2019-08-28 PROCEDURE — 25000132 ZZH RX MED GY IP 250 OP 250 PS 637: Performed by: OBSTETRICS & GYNECOLOGY

## 2019-08-28 PROCEDURE — 36415 COLL VENOUS BLD VENIPUNCTURE: CPT | Performed by: OBSTETRICS & GYNECOLOGY

## 2019-08-28 PROCEDURE — 12000035 ZZH R&B POSTPARTUM

## 2019-08-28 PROCEDURE — 85018 HEMOGLOBIN: CPT | Performed by: OBSTETRICS & GYNECOLOGY

## 2019-08-28 RX ADMIN — ACETAMINOPHEN 975 MG: 325 TABLET ORAL at 21:30

## 2019-08-28 RX ADMIN — OXYCODONE HYDROCHLORIDE 5 MG: 5 TABLET ORAL at 16:38

## 2019-08-28 RX ADMIN — OXYCODONE HYDROCHLORIDE 5 MG: 5 TABLET ORAL at 13:48

## 2019-08-28 RX ADMIN — OXYCODONE HYDROCHLORIDE 5 MG: 5 TABLET ORAL at 22:46

## 2019-08-28 RX ADMIN — OXYCODONE HYDROCHLORIDE 5 MG: 5 TABLET ORAL at 06:10

## 2019-08-28 RX ADMIN — IBUPROFEN 800 MG: 400 TABLET ORAL at 02:04

## 2019-08-28 RX ADMIN — ACETAMINOPHEN 975 MG: 325 TABLET ORAL at 06:10

## 2019-08-28 RX ADMIN — IBUPROFEN 800 MG: 400 TABLET ORAL at 21:30

## 2019-08-28 RX ADMIN — OXYCODONE HYDROCHLORIDE 5 MG: 5 TABLET ORAL at 19:46

## 2019-08-28 RX ADMIN — IBUPROFEN 800 MG: 400 TABLET ORAL at 15:02

## 2019-08-28 RX ADMIN — DOCUSATE SODIUM 100 MG: 100 CAPSULE, LIQUID FILLED ORAL at 19:46

## 2019-08-28 RX ADMIN — IBUPROFEN 800 MG: 400 TABLET ORAL at 08:20

## 2019-08-28 RX ADMIN — DOCUSATE SODIUM 100 MG: 100 CAPSULE, LIQUID FILLED ORAL at 08:21

## 2019-08-28 RX ADMIN — ACETAMINOPHEN 975 MG: 325 TABLET ORAL at 13:46

## 2019-08-28 RX ADMIN — ENOXAPARIN SODIUM 40 MG: 40 INJECTION SUBCUTANEOUS at 19:46

## 2019-08-28 NOTE — PLAN OF CARE
VSS. FF, scant flow. Incision dressing removed, open to air. Voiding and ambulating independently. Breastfeeding infant every 3 hours. Taking tylenol and ibuprofen for pain. Denying the need for oxy. Will continue to monitor.

## 2019-08-28 NOTE — PROGRESS NOTES
[facility]       DAILY NOTE - POSTPARTUM DAY 1     SUBJECTIVE:     Pain controlled? Yes  Tolerating a regular diet? YES  Ambulating? YES  Voiding without difficulty? Yes    OBJECTIVE:  Vitals:    19 1555 19 1955 19 0000 19 0745   BP: 111/71 112/74 97/57 108/55   Pulse:       Resp: 16 16 16 18   Temp: 98.1  F (36.7  C)  97.7  F (36.5  C) 97.7  F (36.5  C)   TempSrc: Oral  Oral Oral   SpO2: 99% 100%     Weight:       Height:           Constitutional: healthy, alert and no distress    Abdomen:  Uterine fundus is firm, non-tender and at the level of the umbilicus     Incision: Healing well      LABS:  Hemoglobin   Date Value Ref Range Status   2019 9.7 (L) 11.7 - 15.7 g/dL Final   2019 12.2 11.7 - 15.7 g/dL Final       ASSESSMENT:  Post-partum day #1 s/p  Section  Pregnancy complicated by NO COMPLICATIONS    Doing well.       PLAN:   Continue routine postpartum cares    Mark Ross MD, MD

## 2019-08-28 NOTE — PROVIDER NOTIFICATION
Paged on call anesthesia for loss IV access. Waiting for call back.     2008: Dr. Zambrano called back. No need to place IV. TORB.

## 2019-08-28 NOTE — LACTATION NOTE
Initial visit with Cindi, FOSOPHIA and baby.   Cindi States baby has been latching well and she is not experiencing pain.    Breastfeeding general information reviewed.   Advised to breastfeed exclusively, on demand, avoid pacifiers, bottles and formula unless medically indicated.  Encouraged rooming in, skin to skin, feeding on demand 8-12x/day or sooner if baby cues.  Explained benefits of holding and skin to skin.  Encouraged lots of skin to skin. Instructed on hand expression. Outpatient resource phone numbers given. Cindi has a Medela breast pump at home.    Continues to nurse well per mom. No further questions at this time.   Will follow as needed.   Charlotte Kim BSN, RN, PHN, RNC-MNN, IBCLC

## 2019-08-28 NOTE — PLAN OF CARE
VSS, taking Tylenol and Ibuprofen for pain. Inc. intact with surgical glue. Wearing abd. binder. Breastfeeding her baby boy ind. Hgb.-9.7.

## 2019-08-28 NOTE — PLAN OF CARE
VSS. Fundus firm and midline. Scant flow. Pain 3/10, taking tylenol and ibuprofen. Dressing marked, no change. Breastfeeding/ pumping. IV access loss, anesthesia aware, see provider notification note. Ambulating free of dizziness and lightheaded. Farias adequate out, removed at 2000. Due to void. Encouraged to call with needs, questions, or concerns. Will continue to monitor.

## 2019-08-29 LAB — COPATH REPORT: NORMAL

## 2019-08-29 PROCEDURE — 12000035 ZZH R&B POSTPARTUM

## 2019-08-29 PROCEDURE — 25000132 ZZH RX MED GY IP 250 OP 250 PS 637: Performed by: OBSTETRICS & GYNECOLOGY

## 2019-08-29 PROCEDURE — 25000128 H RX IP 250 OP 636: Performed by: OBSTETRICS & GYNECOLOGY

## 2019-08-29 RX ORDER — FERROUS SULFATE 325(65) MG
325 TABLET ORAL 2 TIMES DAILY
Status: DISCONTINUED | OUTPATIENT
Start: 2019-08-29 | End: 2019-08-30 | Stop reason: HOSPADM

## 2019-08-29 RX ADMIN — OXYCODONE HYDROCHLORIDE 5 MG: 5 TABLET ORAL at 06:22

## 2019-08-29 RX ADMIN — ACETAMINOPHEN 975 MG: 325 TABLET ORAL at 13:56

## 2019-08-29 RX ADMIN — IBUPROFEN 800 MG: 400 TABLET ORAL at 07:34

## 2019-08-29 RX ADMIN — FERROUS SULFATE TAB 325 MG (65 MG ELEMENTAL FE) 325 MG: 325 (65 FE) TAB at 08:16

## 2019-08-29 RX ADMIN — DOCUSATE SODIUM 100 MG: 100 CAPSULE, LIQUID FILLED ORAL at 07:34

## 2019-08-29 RX ADMIN — ACETAMINOPHEN 975 MG: 325 TABLET ORAL at 06:22

## 2019-08-29 RX ADMIN — IBUPROFEN 800 MG: 400 TABLET ORAL at 20:18

## 2019-08-29 RX ADMIN — IBUPROFEN 800 MG: 400 TABLET ORAL at 13:56

## 2019-08-29 RX ADMIN — FERROUS SULFATE TAB 325 MG (65 MG ELEMENTAL FE) 325 MG: 325 (65 FE) TAB at 20:18

## 2019-08-29 RX ADMIN — ENOXAPARIN SODIUM 40 MG: 40 INJECTION SUBCUTANEOUS at 20:19

## 2019-08-29 RX ADMIN — DOCUSATE SODIUM 100 MG: 100 CAPSULE, LIQUID FILLED ORAL at 20:18

## 2019-08-29 NOTE — PLAN OF CARE
VSS. Fundus firm and midline. Scant flow. Pain 4/10, taking tylenol, ibuprofen and oxycodone. Incision open to air. Breastfeeding. Ambulating free of dizziness and lightheaded. Denies SOB. Voiding without difficulty. Mild edema in hands. Moderate edema in legs, ankles, and feet. Right leg is larger than left, MD aware. Encouraged to call with needs, questions, or concerns. Will continue to monitor.

## 2019-08-29 NOTE — PROGRESS NOTES
S: Patient doing well with no overnight issues and no current complaints.  Pain is well controlled.  Tolerating PO intake.  Breast feeding without issues.  Ambulating without difficulty.  Voiding and passing flatus.  Lochia is less than normal menses and decreasing.  Denies fevers, headaches, changes in vision, chest pain, SOB, nausea, vomiting, diarrhea, constipation, RUQ tenderness, dysuria, or LE pain.    O:   Vitals:    19 1930 19 2040 19 0000 19 0622   BP:   (!) 138/90    BP Location:       Pulse:       Resp:    Temp:   98.1  F (36.7  C)    TempSrc:       SpO2:       Weight:       Height:         NAD, AAOx3, RRR, CTAB, ABd soft NTND, Firm fundus 1cm below the umbilicus, LE NT 3+ edema; incision c/d/i and covered with dermabond    A: 29yo  s/PPD#2 1LTCS who is recovering well.    P: Routine post partum care.  Plan for discharge tomorrow.  Desires circ.  Due to LE edema, plan for Lovenox prophylaxis while inpatient, does not need post partum.  Hb 9.7, start oral iron supplementation.

## 2019-08-29 NOTE — PLAN OF CARE
VSS. Fundus firm and midline. Scant flow. Pain 4/10, taking tylenol, ibuprofen and oxycodone. Incision open to air. Breastfeeding. Ambulating free of dizziness and lightheaded. Voiding without difficulty. Mild edema in hands. Moderate edema in legs, ankles, and feet. Right leg is larger than left, MD aware. Encouraged to call with needs, questions, or concerns. Will continue to monitor.

## 2019-08-29 NOTE — PLAN OF CARE
VSS. Fundus firm and midline. Scant flow. Pain 4/10, taking tylenol, ibuprofen and oxycodone. Incision open to air. Breastfeeding. Ambulating f and denies SOB.du                                                                                                                                                                                                                                                    . Voiding without difficulty. Mild edema in hands. Moderate edema in legs, ankles, and feet. Right leg is larger than left, MD aware. Encouraged to call with needs, questions, or concerns. Will continue to monitor.

## 2019-08-29 NOTE — PLAN OF CARE
Pt's pain controlled with Ibuprofen/Tylenol. Up and about in room and hallways. Abdominal binder on.

## 2019-08-30 VITALS
HEART RATE: 85 BPM | OXYGEN SATURATION: 100 % | BODY MASS INDEX: 35.53 KG/M2 | HEIGHT: 60 IN | RESPIRATION RATE: 16 BRPM | TEMPERATURE: 97.6 F | DIASTOLIC BLOOD PRESSURE: 85 MMHG | SYSTOLIC BLOOD PRESSURE: 130 MMHG | WEIGHT: 181 LBS

## 2019-08-30 LAB — PLATELET # BLD AUTO: 243 10E9/L (ref 150–450)

## 2019-08-30 PROCEDURE — 25000132 ZZH RX MED GY IP 250 OP 250 PS 637: Performed by: OBSTETRICS & GYNECOLOGY

## 2019-08-30 PROCEDURE — 85049 AUTOMATED PLATELET COUNT: CPT | Performed by: OBSTETRICS & GYNECOLOGY

## 2019-08-30 PROCEDURE — 36415 COLL VENOUS BLD VENIPUNCTURE: CPT | Performed by: OBSTETRICS & GYNECOLOGY

## 2019-08-30 RX ORDER — FERROUS SULFATE 325(65) MG
325 TABLET ORAL 2 TIMES DAILY
COMMUNITY
Start: 2019-08-30 | End: 2020-01-09

## 2019-08-30 RX ORDER — OXYCODONE HYDROCHLORIDE 5 MG/1
5-10 TABLET ORAL EVERY 6 HOURS PRN
Qty: 6 TABLET | Refills: 0 | Status: SHIPPED | OUTPATIENT
Start: 2019-08-30 | End: 2020-01-09

## 2019-08-30 RX ORDER — IBUPROFEN 800 MG/1
800 TABLET, FILM COATED ORAL EVERY 6 HOURS PRN
Qty: 60 TABLET | Refills: 1 | Status: SHIPPED | OUTPATIENT
Start: 2019-08-30 | End: 2020-12-29

## 2019-08-30 RX ORDER — DOCUSATE SODIUM 100 MG/1
100 CAPSULE, LIQUID FILLED ORAL 2 TIMES DAILY
COMMUNITY
Start: 2019-08-30 | End: 2020-01-09

## 2019-08-30 RX ADMIN — IBUPROFEN 800 MG: 400 TABLET ORAL at 09:37

## 2019-08-30 RX ADMIN — FERROUS SULFATE TAB 325 MG (65 MG ELEMENTAL FE) 325 MG: 325 (65 FE) TAB at 08:33

## 2019-08-30 RX ADMIN — IBUPROFEN 800 MG: 400 TABLET ORAL at 03:40

## 2019-08-30 RX ADMIN — DOCUSATE SODIUM 100 MG: 100 CAPSULE, LIQUID FILLED ORAL at 08:33

## 2019-08-30 NOTE — DISCHARGE SUMMARY
[facility] Discharge Summary    Cindi Padilla MRN# [id number]   Age: 28 year old YOB: 1990     Date of Admission:  2019  Date of Discharge::  2019   Admitting Physician:  Herminio Phoenix MD  Discharge Physician:  Herminio Phoenix MD     Home clinic: [facility]          Admission Diagnoses:   Late term pregnancy          Discharge Diagnosis:   Same, Delivered, Arrest of descent, Chorioamnionitis          Procedures:   Procedure(s): Primary low transverse  section              Medications Prior to Admission:     Medications Prior to Admission   Medication Sig Dispense Refill Last Dose     Prenatal Multivit-Min-Fe-FA (PRE-SHERON PO)    2019 at Unknown time     ranitidine (ZANTAC) 150 MG tablet    Past Week at Unknown time     ADVAIR DISKUS 250-50 MCG/DOSE inhaler INHALE ONE PUFF BY MOUTH TWICE A DAY 60 Inhaler 2 More than a month at Unknown time     albuterol (PROAIR HFA/PROVENTIL HFA/VENTOLIN HFA) 108 (90 BASE) MCG/ACT Inhaler Inhale 2 puffs into the lungs every 6 hours as needed for shortness of breath / dyspnea 2 Inhaler 5 More than a month at Unknown time             Discharge Medications:     Current Discharge Medication List      START taking these medications    Details   docusate sodium (COLACE) 100 MG capsule Take 1 capsule (100 mg) by mouth 2 times daily    Associated Diagnoses: Labor and delivery, indication for care      ferrous sulfate (FEROSUL) 325 (65 Fe) MG tablet Take 1 tablet (325 mg) by mouth 2 times daily    Associated Diagnoses: Labor and delivery, indication for care      ibuprofen (ADVIL/MOTRIN) 800 MG tablet Take 1 tablet (800 mg) by mouth every 6 hours as needed for other (cramping)  Qty: 60 tablet, Refills: 1    Associated Diagnoses: Labor and delivery, indication for care      oxyCODONE (ROXICODONE) 5 MG tablet Take 1-2 tablets (5-10 mg) by mouth every 6 hours as needed for severe pain  Qty: 6 tablet, Refills: 0    Associated Diagnoses:  Labor and delivery, indication for care         CONTINUE these medications which have NOT CHANGED    Details   Prenatal Multivit-Min-Fe-FA (PRE-SHERON PO)       ranitidine (ZANTAC) 150 MG tablet       ADVAIR DISKUS 250-50 MCG/DOSE inhaler INHALE ONE PUFF BY MOUTH TWICE A DAY  Qty: 60 Inhaler, Refills: 2    Associated Diagnoses: Moderate persistent asthma without complication      albuterol (PROAIR HFA/PROVENTIL HFA/VENTOLIN HFA) 108 (90 BASE) MCG/ACT Inhaler Inhale 2 puffs into the lungs every 6 hours as needed for shortness of breath / dyspnea  Qty: 2 Inhaler, Refills: 5    Comments: PROFILE ONLY - please don't fill today  Associated Diagnoses: Moderate persistent asthma without complication                   Consultations:   No consultations were requested during this admission          Brief History of Labor:   Please review operative note for labor process, diagnosis of chorioamnionitis, arrest of descent, and uncomplicated primary low transverse  section.           Hospital Course:   The patient's hospital course was unremarkable.  She recovered as anticipated and experienced no post-operative complications.  Upon discharge, her pain was well controlled. Vaginal bleeding is mild to moderate.  Voiding without difficulty.  Ambulating well and tolerating a normal diet.  No fever or significant wound drainage.  Breastfeeding well.  She was given Lovenox prophylaxis while inpatient for DVT prevention.  Infant is stable.  She had a bowel movement prior to discharge.  She was discharged on post-partum day #3.    Post-partum hemoglobin:   Hemoglobin   Date Value Ref Range Status   2019 9.7 (L) 11.7 - 15.7 g/dL Final             Discharge Instructions and Follow-Up:   Discharge diet: Regular   Discharge activity: Activity as tolerated, no lifting greater than 10 lbs   Discharge follow-up: Follow up with consultant in 6 weeks for post partum visit   Wound care: Keep wound clean and dry           Discharge  Disposition:   Discharged to home      Attestation:  I have reviewed today's vital signs, notes, medications, labs and imaging.  Amount of time performed on this discharge summary: 10 minutes.    Herminio Phoenix MD

## 2019-08-30 NOTE — PLAN OF CARE
VSS. Voiding adequately on own. Scant vag bleeding. Incision WNL. Pain controlled w/ ibuprofen. Breast feeding well independently.

## 2019-08-30 NOTE — PROGRESS NOTES
S: Patient doing well with no overnight issues and no current complaints.  Pain is well controlled.  Tolerating PO intake.  Breast feeding without issues.  Ambulating without difficulty.  Voiding and passing flatus.  Lochia is less than normal menses and decreasing.  Denies fevers, headaches, changes in vision, chest pain, SOB, nausea, vomiting, diarrhea, constipation, RUQ tenderness, dysuria, or LE pain.    O:   Vitals:    19 0622 19 0832 19 1810 19 0053   BP:  111/73 116/79 120/78   Pulse:       Resp: 18 18 16 16   Temp:  97.6  F (36.4  C) 98.3  F (36.8  C) 97.7  F (36.5  C)   TempSrc:  Oral Oral Oral   SpO2:       Weight:       Height:         NAD, AAOx3, RRR, CTAB, ABd soft NTND, Firm fundus 1cm below the umbilicus, LE NT 3+ edema; incision c/d/i and covered with dermabond, central area of dermabond peeling off, removed and dressed with a steristrip    A: 29yo  s/PPD#3 1LTCS who is recovering well.    P: Routine post partum care.  Plan for discharge today.  Desires circ.  Due to LE edema, plan for Lovenox prophylaxis while inpatient, does not need post partum.  Hb 9.7, start oral iron supplementation.

## 2019-08-30 NOTE — LACTATION NOTE
Routine visit. Cindi is discharging home today with her baby and . She states breastfeeding is going well and infant cluster fed overnight. Her milk is in and she's getting engorged. Discussed ways to relieve engorgement. Recommended she continue marking feedings, voids and stools on feeding log once at home and use outpatient lactation resources as needed. Cindi and her  appreciative of my visit.

## 2019-08-30 NOTE — PLAN OF CARE
Pt's pain controlled with Ibuprofen/Tylenol. Up and about in room. Abdominal binder on. Going home today with .

## 2019-08-30 NOTE — PLAN OF CARE
VSS, breastfeeding, fundus is firm at U/1, scant flow, no clots.  Incision is approximated, liquid bandage, no drainage.  Pain controlled with Tylenol and Ibuprofen.  Independent with cares.   is at bedside and supportive.  Will continue to monitor and support.

## 2019-10-24 ENCOUNTER — DOCUMENTATION ONLY (OUTPATIENT)
Dept: CARE COORDINATION | Facility: CLINIC | Age: 29
End: 2019-10-24

## 2019-10-24 NOTE — PROGRESS NOTES
[facility] will be sharing updates with you on Maternal Child Health Referral requests for home care services.  This is for care coordination purposes and alert you to referral status.  We received the referral for  Cindi Padilla; MRN [id number] and want to update you:      [facility] has made two attempts to contact patient by phone and text message over the last four days.  We have not had any response from patient.  Final message was left advising patient to follow up with Primary Care Providers for mom and baby.  Ordering MD and Primary Care Providers for mom and baby notified.        Sincerely [facility]  [place]on  [phone]

## 2019-12-26 DIAGNOSIS — J45.40 MODERATE PERSISTENT ASTHMA WITHOUT COMPLICATION: ICD-10-CM

## 2019-12-26 NOTE — TELEPHONE ENCOUNTER
"Requested Prescriptions   Pending Prescriptions Disp Refills     fluticasone-salmeterol (ADVAIR DISKUS) 250-50 MCG/DOSE inhaler [Pharmacy Med Name: FLUTICASONE-SALMETEROL 250-50 AEPB]  Last Written Prescription Date:  6/20/2019  Last Fill Quantity: 60 inhaler,  # refills: 2   Last office visit: 3/11/2019 with prescribing provider:  Jorgito   Future Office Visit:   1 Inhaler 2     Sig: INHALE ONE PUFF BY MOUTH TWO TIMES A DAY       Inhaled Steroids Protocol Failed - 12/26/2019 10:59 AM        Failed - Asthma control assessment score within normal limits in last 6 months     Please review ACT score.   ACT Total Scores 3/5/2018 11/6/2018 3/11/2019   ACT TOTAL SCORE (Goal Greater than or Equal to 20) 23 22 24   In the past 12 months, how many times did you visit the emergency room for your asthma without being admitted to the hospital? 0 0 0   In the past 12 months, how many times were you hospitalized overnight because of your asthma? 0 0 0             Failed - Recent (6 mo) or future (30 days) visit within the authorizing provider's specialty     Patient had office visit in the last 6 months or has a visit in the next 30 days with authorizing provider or within the authorizing provider's specialty.  See \"Patient Info\" tab in inbasket, or \"Choose Columns\" in Meds & Orders section of the refill encounter.            Passed - Patient is age 12 or older        Passed - Medication is active on med list         "

## 2019-12-26 NOTE — LETTER
January 7, 2020      Cindi Padilla  [address] [address]        Dear Cindi,    I care about your health and have reviewed your health plan. I have reviewed your medical conditions, medication list, and lab results and am making recommendations based on this review, to better manage your health.    You are in particular need of attention regarding:  -Asthma office visit    I am recommending that you:  -Please complte the enclosed asthma action plan and return it to the clinic in the envelope provided    Here is a list of Health Maintenance topics that are due now or due soon:  Health Maintenance Due   Topic Date Due     Asthma Action Plan - yearly  1990     Asthma Control Test  09/11/2019     PHQ-2  01/01/2020       Please call us at [phone] (or use [employer]) to address the above recommendations.     Thank you for trusting [facility] and we appreciate the opportunity to serve you.  We look forward to supporting your healthcare needs in the future.    Healthy Regards,    Sherif Bull, MPH, PA-C

## 2019-12-30 NOTE — TELEPHONE ENCOUNTER
2nd request- .  Left message on answering machine for patient to call back.        Aracely ASKEWRN Triage  [facility]  [phone]

## 2020-01-02 NOTE — TELEPHONE ENCOUNTER
Third attempt to contact the patient. Please route to triage.   Patient needs to fill out ACT through My Chart message sent to her.  Patient is also overdue for 6 month follow up appointment.  Bhargavi Bender RN

## 2020-01-06 NOTE — TELEPHONE ENCOUNTER
Patient has read mychart but not responded    Last read by Cindi Padilla at 10:23 AM on 12/27/2019.

## 2020-01-06 NOTE — TELEPHONE ENCOUNTER
Routing to team as 3 attempts have been made to reach patient and patient did review [employer] message, but has not responded.     TC please mail letter with ACT along with mailing letter than patient to due for an office visit. Thank you.     Di Head RN, BSN  [facility]

## 2020-01-09 ENCOUNTER — OFFICE VISIT (OUTPATIENT)
Dept: FAMILY MEDICINE | Facility: CLINIC | Age: 30
End: 2020-01-09
Payer: COMMERCIAL

## 2020-01-09 VITALS
HEART RATE: 64 BPM | OXYGEN SATURATION: 97 % | TEMPERATURE: 98.1 F | SYSTOLIC BLOOD PRESSURE: 112 MMHG | HEIGHT: 66 IN | WEIGHT: 144 LBS | BODY MASS INDEX: 23.14 KG/M2 | DIASTOLIC BLOOD PRESSURE: 70 MMHG

## 2020-01-09 DIAGNOSIS — J45.40 MODERATE PERSISTENT ASTHMA WITHOUT COMPLICATION: ICD-10-CM

## 2020-01-09 DIAGNOSIS — Z00.00 ROUTINE GENERAL MEDICAL EXAMINATION AT A HEALTH CARE FACILITY: Primary | ICD-10-CM

## 2020-01-09 PROCEDURE — 99395 PREV VISIT EST AGE 18-39: CPT | Performed by: PHYSICIAN ASSISTANT

## 2020-01-09 RX ORDER — ALBUTEROL SULFATE 90 UG/1
2 AEROSOL, METERED RESPIRATORY (INHALATION) EVERY 6 HOURS PRN
Qty: 2 INHALER | Refills: 5 | Status: SHIPPED | OUTPATIENT
Start: 2020-01-09 | End: 2021-02-08

## 2020-01-09 ASSESSMENT — MIFFLIN-ST. JEOR: SCORE: 1391.99

## 2020-01-09 NOTE — PROGRESS NOTES
SUBJECTIVE:   CC: Cindi Padilla is an 29 year old woman who presents for preventive health visit.     Healthy Habits:    Do you get at least three servings of calcium containing foods daily (dairy, green leafy vegetables, etc.)? yes    Amount of exercise or daily activities, outside of work:  45 min daily     Problems taking medications regularly No    Medication side effects: No    Have you had an eye exam in the past two years? no    Do you see a dentist twice per year? yes    Do you have sleep apnea, excessive snoring or daytime drowsiness? Yes when pt has a cold     Cindi presents to the clinic for a physical exam.  She recently gave birth to her first child, Efrain in august.  Doing well. 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     Today's PHQ-2 Score:   PHQ-2 (  [employer]) 2020 3/11/2019   Q1: Little interest or pleasure in doing things 0 0   Q2: Feeling down, depressed or hopeless 0 0   PHQ-2 Score 0 0       Abuse: Current or Past(Physical, Sexual or Emotional)- No  Do you feel safe in your environment? Yes        Social History     Tobacco Use     Smoking status: Never Smoker     Smokeless tobacco: Never Used   Substance Use Topics     Alcohol use: Not Currently     Comment: not while pregnant     If you drink alcohol do you typically have >3 drinks per day or >7 drinks per week? No                     Reviewed orders with patient.  Reviewed health maintenance and updated orders accordingly - Yes  Patient Active Problem List   Diagnosis     Moderate persistent asthma without complication     CARDIOVASCULAR SCREENING; LDL GOAL LESS THAN 160     Dairy product intolerance     Labor and delivery, indication for care     Normal labor and delivery     Past Surgical History:   Procedure Laterality Date     C REPAIR CRUCIATE LIGAMENT,KNEE Right     No current issues      SECTION N/A 2019    Procedure:  SECTION;  Surgeon: Herminio Phoenix MD;   Location: [facility]     LASIK BILATERAL Bilateral 2016     wisdom teeth         Social History     Tobacco Use     Smoking status: Never Smoker     Smokeless tobacco: Never Used   Substance Use Topics     Alcohol use: Not Currently     Comment: not while pregnant     Family History   Problem Relation Age of Onset     Asthma Mother      Hypothyroidism Mother      Hypertension Father         well-controlled     Colon Polyps Father      Angina Maternal Grandfather         no h/o MI     Alzheimer Disease Paternal Grandmother         passed from kidney stone leading to sepsis     Colon Cancer Paternal Grandfather 80        did not pass from this     Pacemaker Paternal Grandfather      Heart Failure Paternal Grandfather      Colon Polyps Paternal Aunt      Breast Cancer No family hx of          Current Outpatient Medications   Medication Sig Dispense Refill     albuterol (PROAIR HFA/PROVENTIL HFA/VENTOLIN HFA) 108 (90 Base) MCG/ACT inhaler Inhale 2 puffs into the lungs every 6 hours as needed for shortness of breath / dyspnea 2 Inhaler 5     fluticasone-salmeterol (ADVAIR DISKUS) 250-50 MCG/DOSE inhaler INHALE ONE PUFF BY MOUTH TWICE A DAY 60 Inhaler 2     ibuprofen (ADVIL/MOTRIN) 800 MG tablet Take 1 tablet (800 mg) by mouth every 6 hours as needed for other (cramping) 60 tablet 1     Prenatal Multivit-Min-Fe-FA (PRE-SHERON PO)        No Known Allergies  Recent Labs   Lab Test 18   CR 1.02   GFRESTIMATED 65   GFRESTBLACK 79   POTASSIUM 3.9        Mammogram not appropriate for this patient based on age.    Pertinent mammograms are reviewed under the imaging tab.  History of abnormal Pap smear: NO - age 21-29 PAP every 3 years recommended     Reviewed and updated as needed this visit by clinical staff  Tobacco  Allergies  Meds         Reviewed and updated as needed this visit by Provider        Past Medical History:   Diagnosis Date     Asthma     Lifelong, currently stable.  No inhaler use for 3months, and  "infrequent use during preg (19)     NO ACTIVE PROBLEMS       Past Surgical History:   Procedure Laterality Date     C REPAIR CRUCIATE LIGAMENT,KNEE Right     No current issues      SECTION N/A 2019    Procedure:  SECTION;  Surgeon: Herminio Phoneix MD;  Location:  [facility]     LASIK BILATERAL Bilateral 2016     wisdom teeth         ROS:  CONSTITUTIONAL: NEGATIVE for fever, chills, change in weight  INTEGUMENTARU/SKIN: NEGATIVE for worrisome rashes, moles or lesions  EYES: NEGATIVE for vision changes or irritation  ENT: NEGATIVE for ear, mouth and throat problems  RESP: NEGATIVE for significant cough or SOB  BREAST: NEGATIVE for masses, tenderness or discharge  CV: NEGATIVE for chest pain, palpitations or peripheral edema  GI: NEGATIVE for nausea, abdominal pain, heartburn, or change in bowel habits  : NEGATIVE for unusual urinary or vaginal symptoms. Periods are regular.  MUSCULOSKELETAL: NEGATIVE for significant arthralgias or myalgia  NEURO: NEGATIVE for weakness, dizziness or paresthesias  PSYCHIATRIC: NEGATIVE for changes in mood or affect    OBJECTIVE:   /70   Pulse 64   Temp 98.1  F (36.7  C) (Oral)   Ht 1.664 m (5' 5.5\")   Wt 65.3 kg (144 lb)   SpO2 97%   BMI 23.60 kg/m    EXAM:  GENERAL: healthy, alert and no distress  EYES: Eyes grossly normal to inspection, PERRL and conjunctivae and sclerae normal  HENT: ear canals and TM's normal, nose and mouth without ulcers or lesions  NECK: no adenopathy, no asymmetry, masses, or scars and thyroid normal to palpation  RESP: lungs clear to auscultation - no rales, rhonchi or wheezes  BREAST: normal without masses, tenderness or nipple discharge and no palpable axillary masses or adenopathy  CV: regular rate and rhythm, normal S1 S2, no S3 or S4, no murmur, click or rub, no peripheral edema  ABDOMEN: soft, nontender, no hepatosplenomegaly, no masses and bowel sounds normal  MS: no gross musculoskeletal defects noted, no " "edema  SKIN: no suspicious lesions or rashes  NEURO: Normal strength and tone, mentation intact and speech normal  PSYCH: mentation appears normal, affect normal/bright    Diagnostic Test Results:  Labs reviewed in Epic    ASSESSMENT/PLAN:   1. Routine general medical examination at a health care facility    2. Moderate persistent asthma without complication  Well controlled, refills given  - albuterol (PROAIR HFA/PROVENTIL HFA/VENTOLIN HFA) 108 (90 Base) MCG/ACT inhaler; Inhale 2 puffs into the lungs every 6 hours as needed for shortness of breath / dyspnea  Dispense: 2 Inhaler; Refill: 5  - fluticasone-salmeterol (ADVAIR DISKUS) 250-50 MCG/DOSE inhaler; INHALE ONE PUFF BY MOUTH TWICE A DAY  Dispense: 60 Inhaler; Refill: 2    COUNSELING:   Reviewed preventive health counseling, as reflected in patient instructions       Regular exercise       Healthy diet/nutrition    Estimated body mass index is 23.6 kg/m  as calculated from the following:    Height as of this encounter: 1.664 m (5' 5.5\").    Weight as of this encounter: 65.3 kg (144 lb).         reports that she has never smoked. She has never used smokeless tobacco.      Counseling Resources:  ATP IV Guidelines  Pooled Cohorts Equation Calculator  Breast Cancer Risk Calculator  FRAX Risk Assessment  ICSI Preventive Guidelines  Dietary Guidelines for Americans, 2010  USDA's MyPlate  ASA Prophylaxis  Lung CA Screening    Sherif Bull PA-C  [facility]  "

## 2020-01-10 ASSESSMENT — ASTHMA QUESTIONNAIRES: ACT_TOTALSCORE: 24

## 2020-03-22 ENCOUNTER — VIRTUAL VISIT (OUTPATIENT)
Dept: FAMILY MEDICINE | Facility: OTHER | Age: 30
End: 2020-03-22

## 2020-03-22 NOTE — PROGRESS NOTES
"Date: 2020 18:25:39  Clinician: FLAVIO Claudio  Clinician NPI: [id number]  Patient: Cindi Padilla  Patient : 1990  Patient Address: [address] [place] [address]  Patient Phone: [phone]  Visit Protocol: URI  Patient Summary:  Cindi is a 29 year old ( : 1990 ) female who initiated a Visit for COVID-19 (Coronavirus) evaluation and screening. When asked the question \"Please sign me up to receive news, health information and promotions from [facility].\", Cindi responded \"Yes\".    Cindi states her symptoms started 1-2 days ago.   Her symptoms consist of a sore throat and malaise.   Symptom details   Sore throat: Cindi reports having mild throat pain (1-3 on a 10 point pain scale), does not have exudate on her tonsils, and can swallow liquids. The lymph nodes in her neck are not enlarged. A rash has not appeared on the skin since the sore throat started.    Cindi denies having ear pain, rhinitis, enlarged lymph nodes, facial pain or pressure, myalgias, wheezing, cough, nasal congestion, chills, teeth pain, headache, and fever. She also denies taking antibiotic medication for the symptoms and having recent facial or sinus surgery in the past 60 days. She is not experiencing dyspnea.   Precipitating events  Within the past week, Cindi has not been exposed to someone with strep throat.   Pertinent COVID-19 (Coronavirus) information  Cindi has not traveled internationally or to the areas where COVID-19 (Coronavirus) is widespread, including cruise ship travel in the last 14 days before the start of her symptoms.   Cindi has not had a close contact with a laboratory-confirmed COVID-19 patient within 14 days of symptom onset. She also has not had a close contact with a suspected COVID-19 patient within 14 days of symptom onset.   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   Pertinent medical history  Cindi does not " get yeast infections when she takes antibiotics.   Cindi does not need a return to work/school note.   Weight: 132 lbs   Cindi does not smoke or use smokeless tobacco.   She denies pregnancy and denies breastfeeding. She has menstruated in the past month.   Additional information as reported by the patient (free text): I have asthma. My 6 mo son has had a stuffy nose and a dry cough the last 5 days, starting at . No fevers.   Weight: 132 lbs    MEDICATIONS: Advair Diskus inhalation, ALLERGIES: NKDA  Clinician Response:  Dear Cindi,  Based on the information provided, you have viral pharyngitis. This is a sore throat caused by a virus and is usually the first sign of a cold. Your sore throat should resolve in a couple days as other cold symptoms develop.  Unfortunately, there are no medications that can cure a cold, so treatment is focused on controlling symptoms as much as possible until you recover. Most people gradually feel better in 1-2 weeks.  Medication information  Because you have a viral infection, antibiotics will not help you get better. Treating a viral infection with antibiotics could actually make you feel worse.  Unless you are allergic to the over-the-counter medication(s) below, I recommend using:       Acetaminophen (Tylenol or store brand) oral tablet. Take 1-2 tablets by mouth every 4-6 hours to help with the discomfort.      Ibuprofen (Advil or store brand) 200 mg oral tablet. Take 1-3 tablets (200-600 mg) by mouth every 8 hours to help with the discomfort. Make sure to take the ibuprofen with food. Do not exceed 2400 mg in 24 hours.     Over-the-counter medications do not require a prescription. Ask the pharmacist if you have any questions.  Self care  Steps you can take to be as comfortable as possible:     Rest.    Drink plenty of water and other liquids.    Use throat lozenges.    Gargle with warm salt water (1/4 teaspoon of salt per 8 ounce glass of water).    Suck on frozen  items such as popsicles or ice cubes.    Drink hot tea with lemon and honey.     When to seek care  Please be seen in a clinic or urgent care if new symptoms develop, or symptoms become worse.  Call 911 or go to the emergency room if you feel that your throat is closing off, you suddenly develop a rash, you are unable to swallow fluids, you are drooling, or you are having difficulty breathing.  COVID-19 (Coronavirus) General Information  With the increase in the number of COVID-19 (Coronavirus) cases, we understand you may have some questions. Below is some helpful information on COVID-19 (Coronavirus).  How can I protect myself and others from the COVID-19 (Coronavirus)?  Because there is currently no vaccine to prevent infection, the best way to protect yourself is to avoid being exposed to this virus. Put distance between yourself and other people if COVID-19 (Coronavirus) is spreading in your community. The virus is thought to spread mainly from person-to-person.     Between people who are in close contact with one another (within about 6 about) for a prolonged period (10 minutes or longer).    Through respiratory droplets produced when an infected person coughs or sneezes.     The CDC recommends the following additional steps to protect yourself and others:     Wash your hands often with soap and water for at least 20 seconds, especially after blowing your nose, coughing, or sneezing; going to the bathroom; and before eating or preparing food.  Use an alcohol-based hand  that contains at least 60 percent alcohol if soap and water are not available.        Avoid touching your eyes, nose and mouth with unwashed hands.    Avoid close contact with people who are sick.    Stay home when you are sick.    Cover your cough or sneeze with a tissue, then throw the tissue in the trash.    Clean and disinfect frequently touched objects and surfaces.     You can help stop COVID-19 (Coronavirus) by knowing the signs  and symptoms:     Fever    Cough    Shortness of breath     Contact your healthcare provider if   Develop symptoms   AND   Have been in close contact with a person known to have COVID-19 (Coronavirus) or live in or have recently traveled from an area with ongoing spread of COVID-19 (Coronavirus). Call ahead before you go to a doctor's office or emergency room. Tell them about your recent travel and your symptoms.   For the most up to date information, visit the CDC's website.  Self-monitoring  Self-monitoring means people should monitor themselves for fever by taking their temperatures twice a day and remain alert for a cough or difficulty breathing.  It is important to check your health two times each day for 14 days after a potential exposure to a person with COVID-19 (Coronavirus) or after travel from a location where COVID-19 (Coronavirus) is widespread. If you have been exposed to a person with COVID-19 (Coronavirus), it may take up to 14 days to know if you will get sick. Follow the steps below to check and record your health.     Take your temperature with a thermometer twice a day, once in the morning and once in the evening, and watch for a cough or difficulty breathing for 14 days.    Write down your temperature and any COVID-19 symptoms you may have: feeling feverish, coughing, or difficulty breathing.    Stay home from work or school.    Do not take public transportation, taxis, or ride-shares.    Avoid crowded places (such as shopping centers and movie theaters) and limit your activities in public.    Keep your distance from others (about 6 feet or 2 meters).    If you get sick with fever, cough, or trouble breathing, contact your healthcare provider and tell them about your recent travel and/or your symptoms.    If you need to seek medical care for other reasons, such as dialysis, call ahead to your doctor and tell them about your recent travel.     Steps to help prevent the spread of COVID-19  (Coronavirus) if you are sick  If you are sick with COVID-19 (Coronavirus) or suspect you are infected with the virus that causes COVID-19 (Coronavirus), follow the steps below to help prevent the disease from spreading&nbsp;to people in your home and community.     Stay home except to get medical care. Home isolation may be started in consultation with your healthcare clinician.    Separate yourself from other people and animals in your home.    Call ahead before visiting your doctor if you have a medical appointment.    Wear a facemask when you are around other people.    Cover your cough and sneezes.    Clean your hands often.    Avoid sharing personal household items.    Clean and disinfect frequently touched objects and surfaces everyday.    You will need to have someone drop off medications or household supplies (if needed) at your house without coming inside or in contact with you or others living in your house.    Monitor your symptoms and seek prompt medical care if your illness is worsening (e.g. Difficulty breathing).    Discontinue home isolation only in consultation with your healthcare provider.     For more detailed and up to date information on what to do if you are sick, visit this link: What to Do If You Are Sick With Coronavirus Disease 2019 (COVID-19).  Do I need to be tested for COVID-19 (Coronavirus)?     At this time, the limited number of available tests are controlled by the state and local health departments and are being reserved for more seriously ill patients, those with known exposure to confirmed patients, and those with recent travel (within 14 days) to countries with high rates of COVID-19 (Coronavirus).    Decisions on which patients receive testing will be based on the local spread of COVID-19 (Coronavirus) as well as the symptoms. Your healthcare provider will make the final decision on whether you should be tested.    In the meantime, if you have concerns that you may have been  "exposed, it is reasonable to practice \"social distancing.\"&nbsp; If you are ill with a cold or flu-like illness, please monitor your symptoms and reach out to your healthcare provider if your symptoms worsen.    For more up to date information, visit this link: COVID-19 (Coronavirus) Frequently Asked Questions and Answers.      Diagnosis: Viral pharyngitis  Diagnosis ICD: J02.9  "

## 2020-03-24 ENCOUNTER — VIRTUAL VISIT (OUTPATIENT)
Dept: FAMILY MEDICINE | Facility: CLINIC | Age: 30
End: 2020-03-24
Payer: COMMERCIAL

## 2020-03-24 DIAGNOSIS — Z20.822 SUSPECTED COVID-19 VIRUS INFECTION: Primary | ICD-10-CM

## 2020-03-24 PROCEDURE — 99442 ZZC PHYSICIAN TELEPHONE EVALUATION 11-20 MIN: CPT | Performed by: PHYSICIAN ASSISTANT

## 2020-03-24 NOTE — PROGRESS NOTES
"Cindi Padilla is a 29 year old female who is being evaluated via a billable telephone visit.      The patient has been notified of following:     \"This telephone visit will be conducted via a call between you and your physician/provider. We have found that certain health care needs can be provided without the need for a physical exam.  This service lets us provide the care you need with a short phone conversation.  If a prescription is necessary we can send it directly to your pharmacy.  If lab work is needed we can place an order for that and you can then stop by our lab to have the test done at a later time.    If during the course of the call the physician/provider feels a telephone visit is not appropriate, you will not be charged for this service.\"     Cindi Padilla complains of    Chief Complaint   Patient presents with     URI     Cindi is a 30 y/o female with PMH of asthma. Three days ago she developed myalgias, nasal congestion and a cough.  She has been measuring her temperature twice daily and has not had a fever. 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  Her  and her young son are also symptomatic with URI symptoms.  She has not required the use of hr inhaler and is not experiencing wheezing, or SOB.  She would like to know how long she needs to stay home from work.            ALLERGIES  Patient has no known allergies.    Assessment/Plan      1. Suspected Covid-19 Virus Infection  Advised Cindi that her symptoms are consistent with COVID-19.  She is managing well at home and so I have advised that she should quarantine herself and her family in their home for at least 7 days from the onset of symptoms and she must have an improvement in her symptoms for 72 hours.  I have also advised that she call [facility] regarding COVID testing and her specific work requirements.   She is understanding.      Instructions for " Patients  Your symptoms could be due to COVID-19, but it also could be due to a number of other respiratory illnesses.  We are not doing testing at this time for COVID-19 unless symptoms are severe enough to require hospitalization.  It is recommended that you stay home to prevent the spread of your illness.  To do this follow these instructions:    Regardless of if you have been tested or not:  Patient who have symptoms (cough, fever, or shortness of breath), need to isolate for 7 days from when symptoms started OR 72 hours after fever resolves (without fever reducing medications) AND improvement of respiratory symptoms (whichever is longer).      Isolate yourself at home (in own room/own bathroom if possible)    Do Not allow any visitors    Do Not go to work or school    Do Not go to Orthodoxy,  centers, shopping, or other public places.    Do Not shake hands.    Avoid close and intimate contact with others (hugging, kissing).    Follow CDC recommendations for household cleaning of frequently touched services.     After the initial 7 days, continue to isolate yourself from household members as much as possible. To continue decrease the risk of community spread and exposure, you and any members of your household should limit activities in public for 14 days after starting home isolation.     You can reference the following CDC link for helpful home isolation/care tips:  https://www.cdc.gov/coronavirus/2019-ncov/downloads/10Things.pdf    Protect Others:    Cover your mouth and nose with a mask, disposable tissue or wash cloth to avoid spreading germs to others.    Wash your hands and face frequently with soap and water.    Fever Medicines:    For fever relief, take acetaminophen or ibuprofen.    Treat fevers above 101  F (38.3  C) to lower fevers and make you more comfortable.     Acetaminophen (e.g., Tylenol): Take 650 mg (two 325 mg pills) by mouth every 4-6 hours as needed of regular strength Tylenol or  1,000 mg (two 500 mg pills) every 8 hours as needed of Extra Strength Tylenol.     Ibuprofen (e.g., Motrin, Advil): Take 400 mg (two 200 mg pills) by mouth every 6 hours as needed.     Acetaminophen is thought to be safer than ibuprofen or naproxen for people over 65 years old. Acetaminophen is in many OTC and prescription medicines. It might be in more than one medicine that you are taking. You need to be careful and not take an overdose. Before taking any medicine, read all the instructions on the package.    Caution - NSAIDs (e.g., ibuprofen, naproxen): Do not take nonsteroidal anti-inflammatory drugs (NSAIDs) if you have stomach problems, kidney disease, heart failure, or other contraindications to using this type of medicine. Do not take NSAID medicines for over 7 days without consulting your PCP. Do not take NSAID medicines if you are pregnant. Do not take NSAID medicines if you are also taking blood thinners.     Call Back If: Breathing difficulty develops or you become worse.    Additional General Information About COVID-19    COVID-19 - General Information:  Regardless of if you have been tested or not:  Patient who have symptoms (cough, fever, or shortness of breath), need to isolate for 7 days from when symptoms started OR 72 hours after fever resolves (without fever reducing medications) AND improvement of respiratory symptoms (whichever is longer).      Isolate yourself at home (in own room/own bathroom if possible)    Do Not allow any visitors    Do Not go to work or school    Do Not go to Jewish,  centers, shopping, or other public places.    Do Not shake hands.    Avoid close and intimate contact with others (hugging, kissing).    Follow CDC recommendations for household cleaning of frequently touched services.     After the initial 7 days, continue to isolate yourself from household members as much as possible. To continue decrease the risk of community spread and exposure, you and any  members of your household should limit activities in public for 14 days after starting home isolation.     You can reference the following CDC link for helpful home isolation/care tips:  https://www.cdc.gov/coronavirus/2019-ncov/downloads/10Things.pdf    COVID-19 - Symptoms:     The COVID-19 can cause a respiratory illness, such as bronchitis or pneumonia.    The most common symptoms are: cough, fever, and shortness of breath.     Other symptoms are: body aches, chills, diarrhea, fatigue, headache, runny nose, and sore throat     COVID-19 - Exposure Risk Factors:    Exposure to a person who has been diagnosed with COVID-19 .    Travel from an area with recent local transmission of COVID-19 .    The CDC (www.cdc.gov) has the most up-to-date list of where the COVID-19 outbreak is occurring.    COVID-19 - Spreading:     The virus likely spreads through respiratory droplets produced when a person coughs or sneezes. These respiratory droplets can travel approximately 6 feet and can remain on surfaces.  Common disinfectants will kill the virus.    The CDC currently does not recommend healthy people wearing masks.    COVID-19 - Protect Yourself:     Avoid close contact with people known to have this new COVID-19 infection.    Wash hands often with soap and water or alcohol-based hand .    Avoid touching the eyes, nose or mouth.       Thank you for limiting contact with others, wearing a simple mask to cover your cough, practice good hand hygiene habits and accessing our virtual services where possible to limit the spread of this virus.    For more information about COVID19 and options for caring for yourself at home, please visit the CDC website at https://www.cdc.gov/coronavirus/2019-ncov/about/steps-when-sick.html  For more options for care at [facility], please visit our website at [email or web address]         Phone call duration:  20 minutes      Sherif Bull PA-C

## 2020-03-26 ENCOUNTER — OFFICE VISIT (OUTPATIENT)
Dept: URGENT CARE | Facility: URGENT CARE | Age: 30
End: 2020-03-26
Payer: COMMERCIAL

## 2020-03-26 ENCOUNTER — RESULTS ONLY (OUTPATIENT)
Dept: LAB | Age: 30
End: 2020-03-26

## 2020-03-26 DIAGNOSIS — Z20.822 SUSPECTED COVID-19 VIRUS INFECTION: Primary | ICD-10-CM

## 2020-03-26 PROCEDURE — 99207 ZZC NO BILLABLE SERVICE THIS VISIT: CPT

## 2020-03-26 NOTE — PATIENT INSTRUCTIONS
[facility] Employee Health team will receive your Covid 19 test results in the next 1-4 days.  Once your results are received, Employee Health will call you with your test result and discuss your Return to Work timeline and criteria.

## 2020-03-27 LAB
COVID-19 VIRUS PCR TO MAYO - RESULT: NORMAL
SARS-COV-2 RNA SPEC QL NAA+PROBE: ABNORMAL
SPECIMEN SOURCE: ABNORMAL
SPECIMEN SOURCE: NORMAL

## 2020-05-26 ENCOUNTER — APPOINTMENT (OUTPATIENT)
Dept: LAB | Facility: CLINIC | Age: 30
End: 2020-05-26
Payer: COMMERCIAL

## 2020-05-26 ENCOUNTER — RESULTS ONLY (OUTPATIENT)
Dept: LAB | Age: 30
End: 2020-05-26

## 2020-05-27 LAB
COVID-19 SPIKE RBD ABY TITER: NORMAL
COVID-19 SPIKE RBD ABY: NORMAL

## 2020-09-08 ENCOUNTER — NURSE TRIAGE (OUTPATIENT)
Dept: NURSING | Facility: CLINIC | Age: 30
End: 2020-09-08

## 2020-09-08 DIAGNOSIS — J45.40 MODERATE PERSISTENT ASTHMA WITHOUT COMPLICATION: ICD-10-CM

## 2020-09-08 NOTE — TELEPHONE ENCOUNTER
Cindi is calling and requesting a refill for Advair Diskus and please send to [facility] in [employer], [place], [place].      COVID 19 Nurse Triage Plan/Patient Instructions    Please be aware that novel coronavirus (COVID-19) may be circulating in the community. If you develop symptoms such as fever, cough, or SOB or if you have concerns about the presence of another infection including coronavirus (COVID-19), please contact your health care provider or visit [email or web address].     Disposition/Instructions    Home care recommended. Follow home care protocol based instructions.    Thank you for taking steps to prevent the spread of this virus.  o Limit your contact with others.  o Wear a simple mask to cover your cough.  o Wash your hands well and often.    Resources    [facility]: About COVID-19: [email or web address]    CDC: What to Do If You're Sick: www.cdc.gov/coronavirus/2019-ncov/about/steps-when-sick.html    CDC: Ending Home Isolation: www.cdc.gov/coronavirus/2019-ncov/hcp/disposition-in-home-patients.html     CDC: Caring for Someone: www.cdc.gov/coronavirus/2019-ncov/if-you-are-sick/care-for-someone.html     [facility]: Interim Guidance for Hospital Discharge to Home: www.[facility].[place]/diseases/coronavirus/hcp/hospdischarge.pdf    [facility] clinical trials (COVID-19 research studies): clinicalaffairs.[facility]-clinical-trials     Below are the COVID-19 hotlines at the [facility] ([facility]). Interpreters are available.   o For health questions: Call [phone] or [phone] (7 a.m. to 7 p.m.)  o For questions about schools and childcare: Call [phone] or [phone] (7 a.m. to 7 p.m.)                       Additional Information    Negative: Nursing judgment, per information in Reference    Negative: Information only call about a Well Adult (no illness or injury)    Negative: Nursing judgment or information in reference    Negative: Nursing  judgment or information in reference    Negative: Nursing judgment or information in reference    Negative: Nursing judgment or information in reference    Negative: Nursing judgment or information in reference    Negative: Nursing judgment or information in reference    Negative: Nursing judgment or information in reference    Negative: Nursing judgment or information in reference    Negative: Nursing judgment or information in reference    Negative: Nursing judgment or information in reference    Negative: Nursing judgment or information in reference    Negative: Nursing judgment or information in reference    Negative: Nursing judgment or information in reference    Nursing judgment or information in reference    Protocols used: NO GUIDELINE [id number]

## 2020-09-08 NOTE — LETTER
September 22, 2020      Cindi Padilla  [address] [address]        Dear Cindi,    I care about your health and have reviewed your health plan. I have reviewed your medical conditions, medication list, and lab results and am making recommendations based on this review, to better manage your health.    You are in particular need of attention regarding:  -Asthma    I am recommending that you:  -schedule a FOLLOWUP OFFICE APPOINTMENT with me.       Here is a list of Health Maintenance topics that are due now or due soon:  Health Maintenance Due   Topic Date Due     Asthma Action Plan - yearly  1991     Flu Vaccine (1) 09/01/2020       Please call us at [phone] (or use [employer]) to address the above recommendations.     Thank you for trusting [facility] and we appreciate the opportunity to serve you.  We look forward to supporting your healthcare needs in the future.

## 2020-09-11 NOTE — TELEPHONE ENCOUNTER
Called patient and advised of act- phone was cutting out and call disconnected- asked to call back if she could    Aracely MAZARN BSN  [facility]  [phone]

## 2020-09-14 NOTE — TELEPHONE ENCOUNTER
Medication is being filled for 1 time refill only due to:  Patient needs to be seen because over due for 6 month asthma follow up.   Bhargavi Bender RN

## 2020-11-19 ENCOUNTER — E-VISIT (OUTPATIENT)
Dept: URGENT CARE | Facility: URGENT CARE | Age: 30
End: 2020-11-19
Payer: COMMERCIAL

## 2020-11-19 DIAGNOSIS — Z20.822 SUSPECTED COVID-19 VIRUS INFECTION: Primary | ICD-10-CM

## 2020-11-19 PROCEDURE — 99421 OL DIG E/M SVC 5-10 MIN: CPT | Performed by: PHYSICIAN ASSISTANT

## 2020-11-19 NOTE — PATIENT INSTRUCTIONS
Dear Cindi Padilla,    Your symptoms show that you may have coronavirus (COVID-19). This illness can cause fever, cough and trouble breathing. Many people get a mild case and get better on their own. Some people can get very sick.    Will I be tested for COVID-19?  We would like to test you for Covid-19 virus. I have placed orders for this test.     For all employees or close contacts (except [place] and [place] - see below), go to your [employer] home page and scroll down to the section that says  You have an appointment that needs to be scheduled  and click the large green button that says  Schedule Now  and follow the steps to find the next available opening.     If you are unable to complete these steps or if you cannot find any available times, please call [phone] to schedule employee testing.       [place] employees or close contacts, please call [phone].   [place]) employees or close contacts call [phone].    When it's time for your COVID test:  Stay at least 6 feet away from others. (If someone will drive you to your test, stay in the backseat, as far away from the  as you can.)  Cover your mouth and nose with a mask, tissue or washcloth.  Go straight to the testing site. Don't make any stops on the way there or back.    Starting now:     Do not go to work.   o If you receive a negative COVID-19 test result and were NOT exposed to someone with a known positive COVID-19 test, you can return to work once you're free of fever for 24 hours without fever-reducing medication and your symptoms are improving or resolved.  o If you receive a positive COVID-19 test result, you must be cleared by Employee Occupational Health and Safety to return to work.   o If you were exposed to someone who has tested positive for COVID-19, you can return to work 14 days after your last contact with the positive individual, provided you do not have symptoms at all during that time. In some cases,  "your manager may ask you to come back sooner than 14 days.     During this time, don't leave the house except for testing or medical care.  o Stay in your own room, even for meals. Use your own bathroom if you can.  o Stay away from others in your home. No hugging, kissing or shaking hands. No visitors.  o Don't go to work, school or anywhere else.    Clean \"high touch\" surfaces often (doorknobs, counters, handles, etc.). Use a household cleaning spray or wipes. You'll find a full list of  on the EPA website: www.epa.gov/pesticide-registration/list-n-disinfectants-use-against-sars-cov-2.    Cover your mouth and nose with a mask, tissue or washcloth to avoid spreading germs.    Wash your hands and face often. Use soap and water.    People in these groups are at risk for severe illness due to COVID-19:  o People 65 years and older  o People who live in a nursing home or long-term care facility  o People with chronic disease (lung, heart, cancer, diabetes, kidney, liver, immunologic)  o People who have a weakened immune system, including those who:  - Are in cancer treatment  - Take medicine that weakens the immune system, such as corticosteroids  - Had a bone marrow or organ transplant  - Have an immune deficiency  - Have poorly controlled HIV or AIDS  - Are obese (body mass index of 40 or higher)  - Smoke regularly      Caregivers should wear gloves while washing dishes, handling laundry and cleaning bedrooms and bathrooms.    Use caution when washing and drying laundry: Don't shake dirty laundry, and use the warmest water setting that you can.    For more tips, go to www.cdc.gov/coronavirus/2019-ncov/downloads/10Things.pdf.    Sign up for [employer]. We know it's scary to hear that you might have COVID-19. We want to track your symptoms to make sure you're okay over the next 2 weeks. Please look for an email from [employer]--this is a free, online program that we'll use to keep in touch. To sign up, " follow the link in the email you will receive. Learn more at [email or web address]    How can I take care of myself?    Get lots of rest. Drink extra fluids (unless a doctor has told you not to)    Take Tylenol (acetaminophen) for fever or pain. If you have liver or kidney problems, ask your family doctor if it's okay to take Tylenol.  Adults can take either:    650 mg (two 325 mg pills) every 4 to 6 hours, or     1,000 mg (two 500 mg pills) every 8 hours as needed.    Note: Don't take more than 3,000 mg in one day. Acetaminophen is found in many medicines (both prescribed and over-the-counter medicines). Read all labels to be sure you don't take too much.  For children, check the Tylenol bottle for the right dose. The dose is based on the child's age or weight.    If you have other health problems (like cancer, heart failure, an organ transplant or severe kidney disease): Call your specialty clinic if you don't feel better in the next 2 days.    Know when to call 911. Emergency warning signs include:  Trouble breathing or shortness of breath  Pain or pressure in the chest that doesn't go away  Feeling confused like you haven't felt before, or not being able to wake up  Bluish-colored lips or face    Where can I get more information?    [facility] - About COVID-19: [email or web address]  CDC - What to Do If You're Sick: www.cdc.gov/coronavirus/2019-ncov/about/steps-when-sick.html  November 19, 2020    RE:  Cindi Padilla                                                                                                                                                       [address] [address]        To whom it may concern:    I evaluated Cindi Padilla on 11/19/20. Cindi Padilla should be excused from work/school.     Do not go to work.      If you receive a negative COVID-19 test result and were NOT exposed to someone with a known positive COVID-19 test, you can return to work  once you're free of fever for 24 hours without fever-reducing medication and your symptoms are improving or resolved.    If you receive a positive COVID-19 test result, you must be cleared by Employee Occupational Health and Safety to return to work.     If you were exposed to someone who has tested positive for COVID-19, you can return to work 14 days after your last contact with the positive individual, provided you do not have symptoms at all during that time. In some cases, your manager may ask you to come back sooner than 14 days.       Sincerely,  Bhupendra Whitley PA-C

## 2020-11-21 DIAGNOSIS — Z20.822 SUSPECTED COVID-19 VIRUS INFECTION: ICD-10-CM

## 2020-11-21 LAB
SARS-COV-2 RNA SPEC QL NAA+PROBE: NORMAL
SPECIMEN SOURCE: NORMAL

## 2020-11-21 PROCEDURE — U0003 INFECTIOUS AGENT DETECTION BY NUCLEIC ACID (DNA OR RNA); SEVERE ACUTE RESPIRATORY SYNDROME CORONAVIRUS 2 (SARS-COV-2) (CORONAVIRUS DISEASE [COVID-19]), AMPLIFIED PROBE TECHNIQUE, MAKING USE OF HIGH THROUGHPUT TECHNOLOGIES AS DESCRIBED BY CMS-2020-01-R: HCPCS | Performed by: PHYSICIAN ASSISTANT

## 2020-11-22 LAB
LABORATORY COMMENT REPORT: NORMAL
SARS-COV-2 RNA SPEC QL NAA+PROBE: NEGATIVE
SPECIMEN SOURCE: NORMAL

## 2020-12-20 ENCOUNTER — HEALTH MAINTENANCE LETTER (OUTPATIENT)
Age: 30
End: 2020-12-20

## 2020-12-26 ENCOUNTER — TRANSFERRED RECORDS (OUTPATIENT)
Dept: HEALTH INFORMATION MANAGEMENT | Facility: CLINIC | Age: 30
End: 2020-12-26

## 2020-12-27 ENCOUNTER — VIRTUAL VISIT (OUTPATIENT)
Dept: FAMILY MEDICINE | Facility: OTHER | Age: 30
End: 2020-12-27
Payer: COMMERCIAL

## 2020-12-27 PROCEDURE — 99421 OL DIG E/M SVC 5-10 MIN: CPT | Performed by: NURSE PRACTITIONER

## 2020-12-27 NOTE — PROGRESS NOTES
"Date: 2020 11:19:23  Clinician: Lachelle Pina  Clinician NPI: [id number]  Patient: Cindi Padilla  Patient : 1990  Patient Address: [address] [place] [address]  Patient Phone: [phone]  Visit Protocol: URI  Patient Summary:  Cindi is a 30 year old ( : 1990 ) female who initiated a [facility] Visit for COVID-19 (Coronavirus) evaluation and screening. When asked the question \"Please sign me up to receive news, health information and promotions from [facility].\", Cindi responded \"No\".    Cindi states her symptoms started gradually 5-6 days ago.   Her symptoms consist of ear pain, a headache, enlarged lymph nodes, chills, malaise, and a sore throat. Cindi also feels feverish.   Symptom details     Sore throat: Cindi reports having mild throat pain (1-3 on a 10 point pain scale), has exudate on her tonsils, and can swallow liquids. The lymph nodes in her neck are enlarged. A rash has not appeared on the skin since the sore throat started.     Temperature: Her current temperature is 101.2 degrees Fahrenheit. Cindi has had a temperature over 100 degrees Fahrenheit for 3-4 days.     Headache: She states the headache is mild (1-3 on a 10 point pain scale).      Cindi denies having diarrhea, facial pain or pressure, myalgias, anosmia, wheezing, cough, nasal congestion, nausea, teeth pain, ageusia, vomiting, and rhinitis. She also denies double sickening (worsening symptoms after initial improvement), having a sinus infection within the past year, having recent facial or sinus surgery in the past 60 days, and taking antibiotic medication in the past month. She is not experiencing dyspnea.   Precipitating events  Within the past week, Cindi has not been exposed to someone with strep throat. She has not recently been exposed to someone with influenza. Cindi has not been in close contact with any high risk individuals.   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 In the past 14 days, Cindi has worked or volunteered at a hospital or a clinic. 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   Cindi has not had a close contact with a laboratory-confirmed COVID-19 patient within 14 days of symptom onset.    Cindi has been tested for COVID-19.      Date(s) of her COVID-19 test as reported by the patient (free text): 12/26/2020       Result of COVID-19 test as reported by the patient (free text): Still awaiting       Type of test as reported by the patient (free text): Nasal PCR        Triage Point(s) temporarily suspended for COVID-19 (Coronavirus) screening  Cindi reported the following symptoms/conditions. These are protocol referral points that have temporarily been removed for purposes of COVID-19 (Coronavirus) screening.   Meets at least 3/5 centor score criteria     Swollen lymph nodes    Exudate on tonsils    Temp over 100.4    Absence of cough         Pertinent medical history  Cindi has asthma. She uses quick-relief inhaler less than two times per week. She refills her quick-relief inhaler less than two times per year. She wakes up at night with asthma symptoms less than two times per month.   She has not been told by her provider to avoid NSAIDs.   Cindi does not get yeast infections when she takes antibiotics.   Cindi does not have diabetes. She denies having immunosuppressive conditions (e.g., chemotherapy, HIV, organ transplant, long-term use of steroids or other immunosuppressive medications, splenectomy). She denies having congestive heart failure and severe COPD.   Cindi does not need a return to work/school note.   Cindi does not smoke or use smokeless tobacco.   She denies pregnancy and denies breastfeeding. She has menstruated in the past  month.   Additional information as reported by the patient (free text): I got the vaccine on monday. On Tuesday, started having sore enlarged R cervical lymph node and fatigue. Mouth started to feel tender on 12/24. Sore throat on 12/25 in addition to fevers of 101.3 persistently. In the last two days I have developed significant thrush where it is hard to talk and eat due to the pain and gum swelling. It's white and patchy everywhere. Went to urgent care yesterday, sent off a Covid test and rapid strep test, negative for strep. Still awaiting Covid results.   Weight: 135 lbs  A synchronous phone visit was initiated by the provider for the following reason: want to discuss symptoms    MEDICATIONS: Advair Diskus inhalation, ALLERGIES: NKDA  Clinician Response:  Dear Cindi,   This sounds like this could be an immune response to your vaccine you received.&nbsp;  Will give you an Rx for Nystatin swish and swallow. If symptoms do not improve in 1 week, please see your primary care provider.&nbsp;    Diagnosis: Candidiasis, unspecified  Diagnosis ICD: B37.9  Triage Notes: I reviewed the patient's history, verified their identity, and explained the [facility] Visit process.    White patches on the tongue and gums. Mouth feels painful and swollen. Thinks this is thrush.  Synchronous Triage: phone, status: completed, duration: 608 seconds  Prescription: nystatin 100,000 unit/mL oral suspension 140 milliliter, 7 days supply. Take 5 milliliters by mouth 4 times per day for 7 days. Refills: 0, Refill as needed: no, Allow substitutions: yes  Pharmacy: [id number] - [phone] - [address] [place] [address]

## 2020-12-29 ENCOUNTER — VIRTUAL VISIT (OUTPATIENT)
Dept: FAMILY MEDICINE | Facility: CLINIC | Age: 30
End: 2020-12-29
Payer: OTHER MISCELLANEOUS

## 2020-12-29 DIAGNOSIS — J45.20 MILD INTERMITTENT ASTHMA WITHOUT COMPLICATION: ICD-10-CM

## 2020-12-29 DIAGNOSIS — B37.0 CANDIDIASIS OF MOUTH: Primary | ICD-10-CM

## 2020-12-29 DIAGNOSIS — T50.Z95A: ICD-10-CM

## 2020-12-29 PROBLEM — Z80.41 FAMILY HISTORY OF MALIGNANT NEOPLASM OF OVARY: Status: ACTIVE | Noted: 2020-12-01

## 2020-12-29 PROCEDURE — 99214 OFFICE O/P EST MOD 30 MIN: CPT | Mod: 95 | Performed by: INTERNAL MEDICINE

## 2020-12-29 RX ORDER — FLUCONAZOLE 200 MG/1
200 TABLET ORAL DAILY
Qty: 14 TABLET | Refills: 0 | Status: SHIPPED | OUTPATIENT
Start: 2020-12-29 | End: 2021-06-03

## 2020-12-29 RX ORDER — NYSTATIN 100000/ML
SUSPENSION, ORAL (FINAL DOSE FORM) ORAL
COMMUNITY
Start: 2020-12-27 | End: 2021-01-04

## 2020-12-29 NOTE — ASSESSMENT & PLAN NOTE
This is a chronic health problem that is well controlled with current medications and lifestyle measures. Pt states that she has been doing really well and not using PRN albuterol for more than 2 yrs. But does use Advair only as needed 1-2x/week which she feels better by this regimen. Denies SOB or Wheezes.

## 2020-12-29 NOTE — PROGRESS NOTES
"Cindi Padilla is a 30 year old female who is being evaluated via a billable video visit.      The patient has been notified of following:     \"This video visit will be conducted via a call between you and your physician/provider. We have found that certain health care needs can be provided without the need for an in-person physical exam.  This service lets us provide the care you need with a video conversation.  If a prescription is necessary we can send it directly to your pharmacy.  If lab work is needed we can place an order for that and you can then stop by our lab to have the test done at a later time.    Video visits are billed at different rates depending on your insurance coverage.  Please reach out to your insurance provider with any questions.    If during the course of the call the physician/provider feels a video visit is not appropriate, you will not be charged for this service.\"    Patient has given verbal consent for Video visit? Yes  How would you like to obtain your AVS? MyChart  If you are dropped from the video visit, the video invite should be resent to: Text to cell phone: [phone]  Will anyone else be joining your video visit? No    Subjective     Cindi Padilla is a 30 year old female who presents today via video visit for the following health issues:    HPI     Concern - reaction to vaccine  Onset: 12/21/2020  Description: pt feels that she had an immuno response to the Covid vaccine.  Pt started with fever, sore throat, and fatigue.  Started symptoms with in 12 hours.  Pt now believes she has thrush and was prescribed nystatin, but pt is having a hard time eating or drinking and mouth is very painful.  Would also like to have guidance about getting the 2nd dose.  Intensity: moderate  Progression of Symptoms:  improving  Accompanying Signs & Symptoms: none  Previous history of similar problem: none  Precipitating factors:        Worsened by: none  Alleviating factors:        Improved by: " none    Therapies tried and outcome: nystatin        Video Start Time: Start: 12/29/2020 02:25 pm     Pt has severe response to the first dose of pfizer COVID vaccine on 12/21 and had oral thrush followed by fever. Had virtual and E- visits but no physcal exam findings documented anytime. Currently asking for prednisone medication after the booster she is going to get.      Review of Systems   Constitutional, HEENT, cardiovascular, pulmonary, GI, , musculoskeletal, neuro, skin, endocrine and psych systems are negative, except as otherwise noted.      Objective           Vitals:  No vitals were obtained today due to virtual visit.    Physical Exam     GENERAL: Healthy, alert and no distress  EYES: Eyes grossly normal to inspection.  No discharge or erythema, or obvious scleral/conjunctival abnormalities.  MOUTH : POSITIVE for widely distributed Candidal rash on the dorsum of the tongue, pharynx not visible due to limitations in a Video call, though pt denies dysphagia / Odynophagia in pharynx.  RESP: No audible wheeze, cough, or visible cyanosis.  No visible retractions or increased work of breathing.    SKIN: Visible skin clear. No significant rash, abnormal pigmentation or lesions.  NEURO: Cranial nerves grossly intact.  Mentation and speech appropriate for age.  PSYCH: Mentation appears normal, affect normal/bright, judgement and insight intact, normal speech and appearance well-groomed.      No recent labs on blood work seen.          Video-Visit Details    Type of service:  Video Visit    Video End Time:Stop: 12/29/2020 02:41 pm    Originating Location (pt. Location): Home    Distant Location (provider location):  [facility]     Platform used for Video Visit: [facility]        Assessment and Plan  1. Candidiasis of mouth  New problem, given the severity and extent per my physical exam on Video call, will add Fluconozole and do baseline labs.  - nystatin (MYCOSTATIN) 321952 UNIT/ML  suspension  - CBC with platelets differential; Future  - Comprehensive metabolic panel; Future  - fluconazole (DIFLUCAN) 200 MG tablet; Take 1 tablet (200 mg) by mouth daily  Dispense: 14 tablet; Refill: 0  - magic mouthwash suspension (diphenhydrAMINE, lidocaine, aluminum-magnesium & simethicone); Swish and swallow 10 mLs in mouth every 6 hours as needed for mouth sores  Dispense: 120 mL; Refill: 0      2. Vaccine or biological substance causing adverse effect in therapeutic use, initial encounter   Discussed at length on yovani to follow for vaccination. Cannot give prednisone to suppress the response as the purpose of the vaccination will not be fulfilled. She will need to avoid the booster which is going to be even more immunogenic as per the studies.- To avoid the recent side effects. No baseline labs seen on Epic. Will make sure no underlying pathology on CBC ( ? Neutropenia ) discussed which patient agreeable for the plan of baseline labs and also additional PO Diflucan along with Nystatin. V- Safe CDC links given to the patient for notifying CDC her current symptoms after the 1st dose of vaccine.     3. Mild intermittent asthma without complication  Well controlled, continue the current regimen which I have emphasized Advair is scheduled medication but patient is comfortable to take only as needed.      Patient Instructions   As discussed , your Oral thrush is appearing severe and I suspect underlying conditions along with Vaccine side effect. Please do the lab work for Blood work ordered which you can do at any nearby [place] labs.     Continue Nystatin and Oral Fluconazole sent to your pharmacy.     Hydrate yourself well along with Tylenol if you develop fever.     Go to ER immediately for any worsening symptoms.     Please find the information below - for notifying the Side effect to CDC as discussed with you. And do the steps which will prompt you.      https://www.cdc.gov/coronavirus/2019-ncov/vaccines/safety/vsafe.html     V-safe After Vaccination Health     V-safe is a smartphone-based tool that uses text messaging and web surveys to provide personalized health check-ins after you receive a COVID-19 vaccination. Through [employer], you can quickly tell [facility] if you have any side effects after getting the COVID-19 vaccine. Depending on your answers, someone from [facility] may call to check on you and get more information. And [employer] will remind you to get your second COVID-19 vaccine dose if you need one.  Your participation in [facility] s [employer] makes a difference -- it helps keep COVID-19 vaccines safe.    ==================    Patient Education     Medicine Reaction: Allergic  You are having an allergic reaction to a medicine you have taken. This may cause an itchy rash and sometimes swelling of various parts of the body. It could also cause trouble swallowing or breathing. The rash may take a few hours or up to 2 weeks to go away. In the future, remember to tell your healthcare provider about your allergy to this medicine so that medicines of this type won't be used again.   Any medicine can cause an allergic reaction. But most allergic reactions are caused by:     Penicillin and related medicines    Antibiotics containing sulfonamides (sulfa ,medicines)    Aspirin    Ibuprofen or other nonsteroidal anti-inflammatory drugs (NSAIDs)    Seizure medicines  Vaccines may also trigger allergies. People whose parents or siblings have allergies are at a higher risk of developing a medicine allergy. Allergy testing may sometimes be needed to figure out the cause.   Symptoms may occur within minutes, hours, or even weeks after exposure to the medicine. It can be a mild or severe reaction, or potentially life threatening. Most of us think of allergic reactions when we have a rash or itchy skin. Symptoms can include:     Rash, hives, redness, welts, blisters    Itching,  burning, stinging, pain    Dry, flaky, cracking, scaly skin    Belly (abdominal) cramps or nausea or stomach pain    Fever. Sometimes fever is the only symptom of a medicine reaction. In older adults, the risk of fever increases with the number of medicines the person takes.  More severe symptoms include:    Swelling of the face or lips, or drooling    Trouble swallowing, feeling like your throat is closing    Trouble breathing, wheezing    Hoarse voice or trouble speaking    Severe nausea or vomiting or diarrhea      Feeling faint or lightheaded, rapid heart rate    Blistering of the skin or ulcers in the mouth or on the genitals  Home care    The goal of treatment is to help relieve the symptoms and get you feeling better. Mild to medium medicine reactions usually respond quickly to taking antihistamines or steroids and stopping the medicine. The rash will usually fade over several days. But it can sometimes last a couple of weeks. Over the next couple of days, there may be times when it gets a little worse and then better again. Here are some things to do:     Dispose of the medicine safely and don t take it again. The next reaction could be the same or worse.    Call your healthcare provider to discuss adding this medicine's allergy reaction to your electronic medical record.    When getting a new medicine, always tell the healthcare provider that you are allergic to this medicine. Make certain the provider writes it down in your medical record.    Don't wear tight clothing and stay way from anything that heats up your skin (hot showers or baths, direct sunlight). Heat will make itching worse.    An ice pack will relieve local areas of intense itching and redness. To make an ice pack, put ice cubes in a plastic bag that seals at the top. Wrap the bag in a clean, thin towel or cloth. Don t put ice directly on the skin.    To help prevent an infection, don't scratch the affected area. Scratching may worsen the  reaction. It can damage your skin and lead to an infection. Always check the affected site for signs of an infection.    Your provider may give you a prescription antihistamine.    If you are not given a prescription antihistamine, oral diphenhydramine is an over-the-counter antihistamine available at pharmacies and grocery stores. This may be used to reduce itching if large areas of the skin are involved. This antihistamine may make you sleepy, so be careful using it in the daytime or when going to school, working, or driving. Note: Don t use diphenhydramine if you have glaucoma or if you are a man with trouble urinating due to an enlarged prostate. There are other antihistamines that cause less drowsiness and are a good choice for daytime use. Ask your pharmacist or healthcare provider for suggestions.    Don't use diphenhydramine cream on your skin. It can cause a worse skin reaction for some people.    Contact your healthcare provider and ask what can be used on the affected area to help decrease the itching.  Follow-up care  Follow up with your healthcare provider, or as advised, if your symptoms do not continue to improve or they get worse.   Call 911  Call 911 if any of these occur:     Shortness of breath    Cool, moist, pale skin    Swelling in the face, eyelids, mouth, tongue, or lips    Drooling    Trouble breathing or swallowing, wheezing    New or worsening swelling in the mouth, throat, or tongue    Hoarse voice or trouble speaking     Fainting or loss of consciousness    Rapid heart rate    Feeling of dizziness or weakness or a sudden drop in blood pressure    Feeling of doom    Feeling lightheaded    Severe nausea, vomiting, or diarrhea  When to seek medical advice  Call your healthcare provider or get medical care right away if any of these occur:     Continuing or recurring symptoms    Nausea, abdominal cramps, or stomach pain    Spreading areas of itching, redness, or swelling    Blistering of the  skin, or sores or ulcers in the mouth or on the genitals    Signs of infection:  ? Spreading redness  ? Increased pain or swelling  ? Fever of 100.4 F (38 C) or above lasting for 24 to 48 hours, or as directed by your provider  ? Fluid or colored drainage from the affected area  [employer] last reviewed this educational content on 11/1/2019 2000-2020 [employer]. [address] [place] [address]. All rights reserved. This information is not intended as a substitute for professional medical care. Always follow your healthcare professional's instructions.             Return in about 2 weeks (around 1/12/2021), or if symptoms worsen or fail to improve.    Liliana Coates MD  [facility]    Start: 12/29/2020 02:25 pm   Stop: 12/29/2020 02:41 pm

## 2020-12-29 NOTE — LETTER
My Asthma Action Plan    Name: Cindi Padilla   YOB: 1990  Date: 12/29/2020   My doctor: Liliana Coates MD   My clinic: [facility]        My Rescue Medicine:   Albuterol inhaler (Proair/Ventolin/Proventil HFA)  2-4 puffs EVERY 4 HOURS as needed. Use a spacer if recommended by your provider.   My Asthma Severity:   Intermittent / Exercise Induced  Know your asthma triggers: N/A             GREEN ZONE   Good Control    I feel good    No cough or wheeze    Can work, sleep and play without asthma symptoms       Take your asthma control medicine every day.     1. If exercise triggers your asthma, take your rescue medication    15 minutes before exercise or sports, and    During exercise if you have asthma symptoms  2. Spacer to use with inhaler: If you have a spacer, make sure to use it with your inhaler             YELLOW ZONE Getting Worse  I have ANY of these:    I do not feel good    Cough or wheeze    Chest feels tight    Wake up at night   1. Keep taking your Green Zone medications  2. Start taking your rescue medicine:    every 20 minutes for up to 1 hour. Then every 4 hours for 24-48 hours.  3. If you stay in the Yellow Zone for more than 12-24 hours, contact your doctor.  4. If you do not return to the Green Zone in 12-24 hours or you get worse, start taking your oral steroid medicine if prescribed by your provider.           RED ZONE Medical Alert - Get Help  I have ANY of these:    I feel awful    Medicine is not helping    Breathing getting harder    Trouble walking or talking    Nose opens wide to breathe       1. Take your rescue medicine NOW  2. If your provider has prescribed an oral steroid medicine, start taking it NOW  3. Call your doctor NOW  4. If you are still in the Red Zone after 20 minutes and you have not reached your doctor:    Take your rescue medicine again and    Call 911 or go to the emergency room right away    See your regular doctor within 2  weeks of an Emergency Room or Urgent Care visit for follow-up treatment.          Annual Reminders:  Meet with Asthma Educator,  Flu Shot in the Fall, consider Pneumonia Vaccination for patients with asthma (aged 19 and older).    Pharmacy:    [facility] - [place], [place] - [phone]  [facility] [place] [place] - [address] [id number] [facility] - [place], [place] - [facility] [address]    Electronically signed by Liliana Coates MD   Date: 12/29/20                    Asthma Triggers  How To Control Things That Make Your Asthma Worse    Triggers are things that make your asthma worse.  Look at the list below to help you find your triggers and   what you can do about them. You can help prevent asthma flare-ups by staying away from your triggers.      Trigger                                                          What you can do   Cigarette Smoke  Tobacco smoke can make asthma worse. Do not allow smoking in your home, car or around you.  Be sure no one smokes at a child s day care or school.  If you smoke, ask your health care provider for ways to help you quit.  Ask family members to quit too.  Ask your health care provider for a referral to Quit Plan to help you quit smoking, or call 6-008-009-PLAN.     Colds, Flu, Bronchitis  These are common triggers of asthma. Wash your hands often.  Don t touch your eyes, nose or mouth.  Get a flu shot every year.     Dust Mites  These are tiny bugs that live in cloth or carpet. They are too small to see. Wash sheets and blankets in hot water every week.   Encase pillows and mattress in dust mite proof covers.  Avoid having carpet if you can. If you have carpet, vacuum weekly.   Use a dust mask and HEPA vacuum.   Pollen and Outdoor Mold  Some people are allergic to trees, grass, or weed pollen, or molds. Try to keep your windows closed.  Limit time out doors when pollen count is high.   Ask you health care  provider about taking medicine during allergy season.     Animal Dander  Some people are allergic to skin flakes, urine or saliva from pets with fur or feathers. Keep pets with fur or feathers out of your home.    If you can t keep the pet outdoors, then keep the pet out of your bedroom.  Keep the bedroom door closed.  Keep pets off cloth furniture and away from stuffed toys.     Mice, Rats, and Cockroaches  Some people are allergic to the waste from these pests.   Cover food and garbage.  Clean up spills and food crumbs.  Store grease in the refrigerator.   Keep food out of the bedroom.   Indoor Mold  This can be a trigger if your home has high moisture. Fix leaking faucets, pipes, or other sources of water.   Clean moldy surfaces.  Dehumidify basement if it is damp and smelly.   Smoke, Strong Odors, and Sprays  These can reduce air quality. Stay away from strong odors and sprays, such as perfume, powder, hair spray, paints, smoke incense, paint, cleaning products, candles and new carpet.   Exercise or Sports  Some people with asthma have this trigger. Be active!  Ask your doctor about taking medicine before sports or exercise to prevent symptoms.    Warm up for 5-10 minutes before and after sports or exercise.     Other Triggers of Asthma  Cold air:  Cover your nose and mouth with a scarf.  Sometimes laughing or crying can be a trigger.  Some medicines and food can trigger asthma.

## 2020-12-29 NOTE — PATIENT INSTRUCTIONS
As discussed , your Oral thrush is appearing severe and I suspect underlying conditions along with Vaccine side effect. Please do the lab work for Blood work ordered which you can do at any nearby [place] labs.     Continue Nystatin and Oral Fluconazole sent to your pharmacy.     Hydrate yourself well along with Tylenol if you develop fever.     Go to ER immediately for any worsening symptoms.     Please find the information below - for notifying the Side effect to [facility] as discussed with you. And do the steps which will prompt you.     https://www.cdc.gov/coronavirus/2019-ncov/vaccines/safety/vsafe.html     V-safe After Vaccination Health     V-safe is a smartphone-based tool that uses text messaging and web surveys to provide personalized health check-ins after you receive a COVID-19 vaccination. Through [employer], you can quickly tell [facility] if you have any side effects after getting the COVID-19 vaccine. Depending on your answers, someone from [facility] may call to check on you and get more information. And T[employer]wind will remind you to get your second COVID-19 vaccine dose if you need one.  Your participation in [facility] s [employer] makes a difference -- it helps keep COVID-19 vaccines safe.    ==================    Patient Education     Medicine Reaction: Allergic  You are having an allergic reaction to a medicine you have taken. This may cause an itchy rash and sometimes swelling of various parts of the body. It could also cause trouble swallowing or breathing. The rash may take a few hours or up to 2 weeks to go away. In the future, remember to tell your healthcare provider about your allergy to this medicine so that medicines of this type won't be used again.   Any medicine can cause an allergic reaction. But most allergic reactions are caused by:     Penicillin and related medicines    Antibiotics containing sulfonamides (sulfa ,medicines)    Aspirin    Ibuprofen or other nonsteroidal anti-inflammatory drugs  (NSAIDs)    Seizure medicines  Vaccines may also trigger allergies. People whose parents or siblings have allergies are at a higher risk of developing a medicine allergy. Allergy testing may sometimes be needed to figure out the cause.   Symptoms may occur within minutes, hours, or even weeks after exposure to the medicine. It can be a mild or severe reaction, or potentially life threatening. Most of us think of allergic reactions when we have a rash or itchy skin. Symptoms can include:     Rash, hives, redness, welts, blisters    Itching, burning, stinging, pain    Dry, flaky, cracking, scaly skin    Belly (abdominal) cramps or nausea or stomach pain    Fever. Sometimes fever is the only symptom of a medicine reaction. In older adults, the risk of fever increases with the number of medicines the person takes.  More severe symptoms include:    Swelling of the face or lips, or drooling    Trouble swallowing, feeling like your throat is closing    Trouble breathing, wheezing    Hoarse voice or trouble speaking    Severe nausea or vomiting or diarrhea      Feeling faint or lightheaded, rapid heart rate    Blistering of the skin or ulcers in the mouth or on the genitals  Home care    The goal of treatment is to help relieve the symptoms and get you feeling better. Mild to medium medicine reactions usually respond quickly to taking antihistamines or steroids and stopping the medicine. The rash will usually fade over several days. But it can sometimes last a couple of weeks. Over the next couple of days, there may be times when it gets a little worse and then better again. Here are some things to do:     Dispose of the medicine safely and don t take it again. The next reaction could be the same or worse.    Call your healthcare provider to discuss adding this medicine's allergy reaction to your electronic medical record.    When getting a new medicine, always tell the healthcare provider that you are allergic to this  medicine. Make certain the provider writes it down in your medical record.    Don't wear tight clothing and stay way from anything that heats up your skin (hot showers or baths, direct sunlight). Heat will make itching worse.    An ice pack will relieve local areas of intense itching and redness. To make an ice pack, put ice cubes in a plastic bag that seals at the top. Wrap the bag in a clean, thin towel or cloth. Don t put ice directly on the skin.    To help prevent an infection, don't scratch the affected area. Scratching may worsen the reaction. It can damage your skin and lead to an infection. Always check the affected site for signs of an infection.    Your provider may give you a prescription antihistamine.    If you are not given a prescription antihistamine, oral diphenhydramine is an over-the-counter antihistamine available at pharmacies and grocery stores. This may be used to reduce itching if large areas of the skin are involved. This antihistamine may make you sleepy, so be careful using it in the daytime or when going to school, working, or driving. Note: Don t use diphenhydramine if you have glaucoma or if you are a man with trouble urinating due to an enlarged prostate. There are other antihistamines that cause less drowsiness and are a good choice for daytime use. Ask your pharmacist or healthcare provider for suggestions.    Don't use diphenhydramine cream on your skin. It can cause a worse skin reaction for some people.    Contact your healthcare provider and ask what can be used on the affected area to help decrease the itching.  Follow-up care  Follow up with your healthcare provider, or as advised, if your symptoms do not continue to improve or they get worse.   Call 911  Call 911 if any of these occur:     Shortness of breath    Cool, moist, pale skin    Swelling in the face, eyelids, mouth, tongue, or lips    Drooling    Trouble breathing or swallowing, wheezing    New or worsening swelling  in the mouth, throat, or tongue    Hoarse voice or trouble speaking     Fainting or loss of consciousness    Rapid heart rate    Feeling of dizziness or weakness or a sudden drop in blood pressure    Feeling of doom    Feeling lightheaded    Severe nausea, vomiting, or diarrhea  When to seek medical advice  Call your healthcare provider or get medical care right away if any of these occur:     Continuing or recurring symptoms    Nausea, abdominal cramps, or stomach pain    Spreading areas of itching, redness, or swelling    Blistering of the skin, or sores or ulcers in the mouth or on the genitals    Signs of infection:  ? Spreading redness  ? Increased pain or swelling  ? Fever of 100.4 F (38 C) or above lasting for 24 to 48 hours, or as directed by your provider  ? Fluid or colored drainage from the affected area  Rock[employer] last reviewed this educational content on 11/1/2019 2000-2020 [employer]. [address] [place] [address]. All rights reserved. This information is not intended as a substitute for professional medical care. Always follow your healthcare professional's instructions.

## 2021-01-01 DIAGNOSIS — B37.0 CANDIDIASIS OF MOUTH: ICD-10-CM

## 2021-01-01 PROCEDURE — 36415 COLL VENOUS BLD VENIPUNCTURE: CPT | Performed by: INTERNAL MEDICINE

## 2021-01-01 PROCEDURE — 85025 COMPLETE CBC W/AUTO DIFF WBC: CPT | Performed by: INTERNAL MEDICINE

## 2021-01-01 PROCEDURE — 80053 COMPREHEN METABOLIC PANEL: CPT | Performed by: INTERNAL MEDICINE

## 2021-01-02 LAB
ALBUMIN SERPL-MCNC: 3.6 G/DL (ref 3.4–5)
ALP SERPL-CCNC: 57 U/L (ref 40–150)
ALT SERPL W P-5'-P-CCNC: 15 U/L (ref 0–50)
ANION GAP SERPL CALCULATED.3IONS-SCNC: 3 MMOL/L (ref 3–14)
AST SERPL W P-5'-P-CCNC: 15 U/L (ref 0–45)
BILIRUB SERPL-MCNC: 0.5 MG/DL (ref 0.2–1.3)
BUN SERPL-MCNC: 12 MG/DL (ref 7–30)
CALCIUM SERPL-MCNC: 9.1 MG/DL (ref 8.5–10.1)
CHLORIDE SERPL-SCNC: 105 MMOL/L (ref 94–109)
CO2 SERPL-SCNC: 29 MMOL/L (ref 20–32)
CREAT SERPL-MCNC: 0.71 MG/DL (ref 0.52–1.04)
GFR SERPL CREATININE-BSD FRML MDRD: >90 ML/MIN/{1.73_M2}
GLUCOSE SERPL-MCNC: 97 MG/DL (ref 70–99)
POTASSIUM SERPL-SCNC: 4.8 MMOL/L (ref 3.4–5.3)
PROT SERPL-MCNC: 7.9 G/DL (ref 6.8–8.8)
SODIUM SERPL-SCNC: 137 MMOL/L (ref 133–144)

## 2021-01-04 ENCOUNTER — VIRTUAL VISIT (OUTPATIENT)
Dept: FAMILY MEDICINE | Facility: CLINIC | Age: 31
End: 2021-01-04
Payer: OTHER MISCELLANEOUS

## 2021-01-04 DIAGNOSIS — B37.0 CANDIDIASIS OF MOUTH: ICD-10-CM

## 2021-01-04 DIAGNOSIS — T50.Z95D ADVERSE EFFECT OF VACCINE, SUBSEQUENT ENCOUNTER: Primary | ICD-10-CM

## 2021-01-04 LAB
DIFFERENTIAL METHOD BLD: NORMAL
EOSINOPHIL # BLD AUTO: 0.2 10E9/L (ref 0–0.7)
EOSINOPHIL NFR BLD AUTO: 3 %
ERYTHROCYTE [DISTWIDTH] IN BLOOD BY AUTOMATED COUNT: 11.6 % (ref 10–15)
HCT VFR BLD AUTO: 38.5 % (ref 35–47)
HGB BLD-MCNC: 13 G/DL (ref 11.7–15.7)
LYMPHOCYTES # BLD AUTO: 2.3 10E9/L (ref 0.8–5.3)
LYMPHOCYTES NFR BLD AUTO: 29 %
MCH RBC QN AUTO: 31.2 PG (ref 26.5–33)
MCHC RBC AUTO-ENTMCNC: 33.8 G/DL (ref 31.5–36.5)
MCV RBC AUTO: 92 FL (ref 78–100)
MONOCYTES # BLD AUTO: 0.5 10E9/L (ref 0–1.3)
MONOCYTES NFR BLD AUTO: 6 %
NEUTROPHILS # BLD AUTO: 4.8 10E9/L (ref 1.6–8.3)
NEUTROPHILS NFR BLD AUTO: 62 %
PLATELET # BLD AUTO: 307 10E9/L (ref 150–450)
RBC # BLD AUTO: 4.17 10E12/L (ref 3.8–5.2)
VARIANT LYMPHS BLD QL SMEAR: PRESENT
WBC # BLD AUTO: 7.8 10E9/L (ref 4–11)

## 2021-01-04 PROCEDURE — 99214 OFFICE O/P EST MOD 30 MIN: CPT | Mod: 95 | Performed by: PHYSICIAN ASSISTANT

## 2021-01-04 RX ORDER — NYSTATIN 100000/ML
SUSPENSION, ORAL (FINAL DOSE FORM) ORAL
Qty: 400 ML | Refills: 0 | Status: SHIPPED | OUTPATIENT
Start: 2021-01-04 | End: 2021-06-03

## 2021-01-04 NOTE — PROGRESS NOTES
Cindi Padilla is a 30 year old female who is being evaluated via a billable video visit.      How would you like to obtain your AVS? MyChart  If the video visit is dropped, the invitation should be resent by: Text to cell phone: [phone]  Will anyone else be joining your video visit? No    Video Start Time: 10:58 AM  Assessment & Plan     Adverse effect of vaccine, subsequent encounter  Cindi experiencing lymphadenopathy flu like symptoms and subsequent thrush following the first administration of the COVID 19 vaccine.  Her symptoms have mostly resolved now.  Today, we reviewed the CDCs guidance on COVID 19 vaccine contraindications.  She did not have an anaphylactic reaction and her side effects of the vaccine have all been reported as potential side effects with this vaccination except for thrush.  I have advised that she experienced an immune overresponse to this vaccine.  I cannot find a specific contraindication to her receiving the second dose and have advised that she may consider receiving the second vaccine if she chooses.  All questions answered    Candidiasis of mouth  - nystatin (MYCOSTATIN) 766685 UNIT/ML suspension; Swish and spit 4x daily x 5 days    Review of external notes as documented above     30 minutes spent on the date of the encounter doing chart review, review of outside records, patient visit and documentation                No follow-ups on file.    PEARL [facility]     Cindi Padilla is a 30 year old female who presents to clinic today for the following health issues     HPI       Concern - Follow up discuss if she should receive 2nd Covid vaccine  Onset: had reaction to first Covid vaccine 12/21/20 6pm started with symptoms 12 hours later  Description: Has been getting vague responses to as if she should get the second vaccine or not.  Intensity: moderate,   Progression of Symptoms:  worsening  Accompanying Signs &  Symptoms:   Previous history of similar problem: reaction to Covid vaccine?  Precipitating factors:        Worsened by:   Alleviating factors:        Improved by:   Therapies tried and outcome:  none       Had pfizer  COVID 19 vaccination on 12/21/2020.  Awoke the next day with right sided anteriorcervical lymphadenopathy.  Over the next 2-3 days she developed fatigue and fever of 102 Cris 12/24/2020, sore throat and sore gingiva.  She was seen in  where testing for COVID, influenza, and strep were all negative.  As her fever subsided, she developed severe oral thrush. She was first prescribed nystatin rinse on 12/26/2020 and subsequently was seen vi video visit last week and prescribed magic mouthwash and fluconazole.  Thrush has improved significantly, still having only a few persistent mouth sores but now eating and drinking without discomfort.  She still has one non tender right anterior cervical Lymph node that is enlarged.  She is wondering whether she should be given the second vaccination.           Review of Systems   Constitutional, HEENT, cardiovascular, pulmonary, GI, , musculoskeletal, neuro, skin, endocrine and psych systems are negative, except as otherwise noted.      Objective           Vitals:  No vitals were obtained today due to virtual visit.    Physical Exam   GENERAL: Healthy, alert and no distress  EYES: Eyes grossly normal to inspection.  No discharge or erythema, or obvious scleral/conjunctival abnormalities.  RESP: No audible wheeze, cough, or visible cyanosis.  No visible retractions or increased work of breathing.    SKIN: Visible skin clear. No significant rash, abnormal pigmentation or lesions.  NEURO: Cranial nerves grossly intact.  Mentation and speech appropriate for age.  PSYCH: Mentation appears normal, affect normal/bright, judgement and insight intact, normal speech and appearance well-groomed.    Orders Only on 01/01/2021   Component Date Value Ref Range Status     Sodium  01/01/2021 137  133 - 144 mmol/L Final     Potassium 01/01/2021 4.8  3.4 - 5.3 mmol/L Final     Chloride 01/01/2021 105  94 - 109 mmol/L Final     Carbon Dioxide 01/01/2021 29  20 - 32 mmol/L Final     Anion Gap 01/01/2021 3  3 - 14 mmol/L Final     Glucose 01/01/2021 97  70 - 99 mg/dL Final     Urea Nitrogen 01/01/2021 12  7 - 30 mg/dL Final     Creatinine 01/01/2021 0.71  0.52 - 1.04 mg/dL Final     GFR Estimate 01/01/2021 >90  >60 mL/min/[1.73_m2] Final    Comment: Starting 12/18/2018, serum creatinine based estimated GFR (eGFR) will be   calculated using the Chronic Kidney Disease Epidemiology Collaboration   (CKD-EPI) equation.       GFR Estimate If Black 01/01/2021 >90  >60 mL/min/[1.73_m2] Final    Comment: Starting 12/18/2018, serum creatinine based estimated GFR (eGFR) will be   calculated using the Chronic Kidney Disease Epidemiology Collaboration   (CKD-EPI) equation.       Calcium 01/01/2021 9.1  8.5 - 10.1 mg/dL Final     Bilirubin Total 01/01/2021 0.5  0.2 - 1.3 mg/dL Final     Albumin 01/01/2021 3.6  3.4 - 5.0 g/dL Final     Protein Total 01/01/2021 7.9  6.8 - 8.8 g/dL Final     Alkaline Phosphatase 01/01/2021 57  40 - 150 U/L Final     ALT 01/01/2021 15  0 - 50 U/L Final     AST 01/01/2021 15  0 - 45 U/L Final     WBC 01/01/2021 7.8  4.0 - 11.0 10e9/L Final     RBC Count 01/01/2021 4.17  3.8 - 5.2 10e12/L Final     Hemoglobin 01/01/2021 13.0  11.7 - 15.7 g/dL Final     Hematocrit 01/01/2021 38.5  35.0 - 47.0 % Final     MCV 01/01/2021 92  78 - 100 fl Final     MCH 01/01/2021 31.2  26.5 - 33.0 pg Final     MCHC 01/01/2021 33.8  31.5 - 36.5 g/dL Final     RDW 01/01/2021 11.6  10.0 - 15.0 % Final     Platelet Count 01/01/2021 307  150 - 450 10e9/L Final     Diff Method 01/01/2021 PENDING   Incomplete               Video-Visit Details    Type of service:  Video Visit    Video End Time:11:26 AM    Originating Location (pt. Location): Home    Distant Location (provider location):  [facility]  DALJIT CONNER     Platform used for Video Visit: Brit

## 2021-02-08 ENCOUNTER — VIRTUAL VISIT (OUTPATIENT)
Dept: FAMILY MEDICINE | Facility: CLINIC | Age: 31
End: 2021-02-08
Payer: COMMERCIAL

## 2021-02-08 DIAGNOSIS — B37.0 THRUSH: ICD-10-CM

## 2021-02-08 DIAGNOSIS — T50.Z95S ADVERSE EFFECT OF VACCINE, SEQUELA: Primary | ICD-10-CM

## 2021-02-08 PROCEDURE — 99213 OFFICE O/P EST LOW 20 MIN: CPT | Mod: 95 | Performed by: PHYSICIAN ASSISTANT

## 2021-02-08 NOTE — PROGRESS NOTES
Cindi is a 30 year old who is being evaluated via a billable video visit.      How would you like to obtain your AVS? MyChart  If the video visit is dropped, the invitation should be resent by: Text to cell phone: [phone]  Will anyone else be joining your video visit? No    Video Start Time: 950 am  Assessment & Plan     Adverse effect of vaccine, sequela  No new treatment added today, she is improving with fluconazole. She will continue to monitr her symptoms and will follow up if thrush reoccurs.  She may need further workup if this occurs    Thrush                 Return in about 6 months (around 8/8/2021) for if new or worsening symptoms.    Sherif Bull PA-C  [facility]    Sally Puente is a 30 year old who presents to clinic today for the following health issues     HPI       Cindi is a healthy, 29 y/o female with recent reaction to the first dose of COVID 19 vaccine in January ( see notes).    She has been advised by the [place] dept of health to avoid her second dose due to her mucocutaneous reaction following the first dose.  She recently had a virtual visit for recurrent thrush and so she was given fluconazole which she is currently taking.  Thrush is improving.  She is no longer using her daily asthma controller.       Review of Systems   Constitutional, HEENT, cardiovascular, pulmonary, gi and gu systems are negative, except as otherwise noted.      Objective           Vitals:  No vitals were obtained today due to virtual visit.    Physical Exam   GENERAL: Healthy, alert and no distress  EYES: Eyes grossly normal to inspection.  No discharge or erythema, or obvious scleral/conjunctival abnormalities.  RESP: No audible wheeze, cough, or visible cyanosis.  No visible retractions or increased work of breathing.    SKIN: Visible skin clear. No significant rash, abnormal pigmentation or lesions.  NEURO: Cranial nerves grossly intact.  Mentation and speech  appropriate for age.  PSYCH: Mentation appears normal, affect normal/bright, judgement and insight intact, normal speech and appearance well-groomed.            Video-Visit Details    Type of service:  Video Visit    Video End Time:1010 am    Originating Location (pt. Location): Home    Distant Location (provider location):  [facility]     Platform used for Video Visit: [employer]orPpay

## 2021-02-28 ENCOUNTER — HEALTH MAINTENANCE LETTER (OUTPATIENT)
Age: 31
End: 2021-02-28

## 2021-04-13 ENCOUNTER — TELEPHONE (OUTPATIENT)
Dept: FAMILY MEDICINE | Facility: CLINIC | Age: 31
End: 2021-04-13

## 2021-04-13 NOTE — TELEPHONE ENCOUNTER
I received some form from patients work to fill for her dates missed during the time of her vaccine reaction.  Please ask that she set up a phone visit so that we can over over specific dates that she missed and ensure that I have all the information

## 2021-04-15 NOTE — TELEPHONE ENCOUNTER
Pt does not want to schedule a telephone visit because she does not want to be charged. Pt had the vaccine on Dec 21 and then she went to [facility] on 12/26 and 12/27 had a visit with [facility]. She had a visit at  on 12/27 when she got fluconazole. Another discussion on Jan 4 to discuss second vaccine. Follow up visit after that because her thrush came back.   Alayna Sim,

## 2021-04-19 ENCOUNTER — TELEPHONE (OUTPATIENT)
Dept: INFECTIOUS DISEASES | Facility: CLINIC | Age: 31
End: 2021-04-19

## 2021-04-19 NOTE — TELEPHONE ENCOUNTER
[facility] Call Center    Phone Message    May a detailed message be left on voicemail: yes     Reason for Call: Other: Pt requesting call back to discuss an appt. Pt stated she has had thrush 4 times now since this last December and does not know what to do. Pt is very concerned and is wanting to be seen to discuss treatment     Action Taken: Message routed to:  [facility] ([facility]): ID

## 2021-04-19 NOTE — TELEPHONE ENCOUNTER
RECORDS RECEIVED FROM: Internal / Self   DATE RECEIVED: 05.07.2021   NOTES (Gather within 2 years) STATUS DETAILS   OFFICE NOTE from referring provider   N/A    OFFICE NOTE from other specialist Internal 02.08.2021    Sherif Bull PA-C     12.29.2020 Liliana Coates MD     DISCHARGE SUMMARY from hospital N/A    DISCHARGE REPORT from the ER N/A    LABS (any labs) Internal    MEDICATION LIST Internal    IMAGING  (NEED IMAGES AND REPORTS)     Osteomyelitis: Foot imaging  N/A    Liver Abscess: Abdominal imaging N/A    Other (anything related to diagnoses N/A

## 2021-05-07 ENCOUNTER — PRE VISIT (OUTPATIENT)
Dept: INFECTIOUS DISEASES | Facility: CLINIC | Age: 31
End: 2021-05-07

## 2021-05-07 ENCOUNTER — MYC MEDICAL ADVICE (OUTPATIENT)
Dept: INFECTIOUS DISEASES | Facility: CLINIC | Age: 31
End: 2021-05-07

## 2021-05-07 ENCOUNTER — VIRTUAL VISIT (OUTPATIENT)
Dept: INFECTIOUS DISEASES | Facility: CLINIC | Age: 31
End: 2021-05-07
Attending: INTERNAL MEDICINE
Payer: OTHER MISCELLANEOUS

## 2021-05-07 DIAGNOSIS — T50.Z95A ADVERSE EFFECT OF VACCINE, INITIAL ENCOUNTER: ICD-10-CM

## 2021-05-07 DIAGNOSIS — K14.0 GLOSSITIS: ICD-10-CM

## 2021-05-07 DIAGNOSIS — K12.30 MUCOSITIS: ICD-10-CM

## 2021-05-07 PROCEDURE — 99204 OFFICE O/P NEW MOD 45 MIN: CPT | Mod: 95 | Performed by: INTERNAL MEDICINE

## 2021-05-07 ASSESSMENT — PAIN SCALES - GENERAL: PAINLEVEL: NO PAIN (0)

## 2021-05-07 NOTE — LETTER
"5/7/2021       RE: Cindi Padilla  [address] [facility] [address]     Dear Colleague,    Thank you for referring your patient, Cindi Padilla, to the [facility] [place] at [facility]. Please see a copy of my visit note below.    Cindi is a 30 year old who is being evaluated via a billable video visit.      How would you like to obtain your AVS? MyChart  If the video visit is dropped, the invitation should be resent by: Text to cell phone: [phone]  Will anyone else be joining your video visit? No    Video-Visit Details  Video Time: 47 min   Originating Location (pt. Location): Home  Distant Location (provider location):  [facility] [place]   Platform used for Video Visit: Topio    INFECTIOUS DISEASES CONSULTATION  NOTE  Consult requested by: self referred   Reason for Consult : thrush on tongue  Date of Consult: 5/7/21    HISTORY OF PRESENT ILLNESS:                                                    Cindi Padilla is a 30 year old female with history of mild asthma ( uses advair on as needed basis), cold sores, with chief concern of oral thrush after Pfizer COVID19 vaccine.     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 She adheres to all COVID19 precautions with appropriate PPE. She received first dose of Pfizer COVID19 vaccine on 12/21/20 and the next 2 days she had flu like symptoms with painful right cervical lymphadenopathy , fatigue and scratchy throat. on the 12/25, her gum was swollen and painful , fever was up to 102F with chills and she took tylenol and Ibuprofen round the clock.No cough, shortness of breath.  on 12/26, she still had fever of 102F, mild headaches and HR in 130s and described \"oral stomatitis\" and sore throat with sores in her mouth. she described the oral mucosal membrane and tongue as red with white patches, she refers this as oral candidiasis. very painful to eat and drink. " "denies dental pain. no nausea, vomiting, diarrhea, cough, shortness of breath. She lost about 12 lbs during this time. She went to urgent care on 12/26/21 and tested negative for COVID19 (PCR) and rapid Strep. ( I dont have the test results). She called [facility] line and spoke with Lachelle Pina CNP on 12/27/20.  She was diagnosed with oral candidiasis and  was prescribed with nystatin swish and swallow x 7 days. \".candidiasis\" did not subside and she still could not eat. She had a video follow-up with Liliana Coates MD on 12/29/21 and again diagnosed with oral candidiasis and prescribed Fluconazole 200 mg po daily x 14 days. 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 oral sores / candidiasis resolved at the end of January 2021 and enlarged cervical lymphnodes normalized in March 2021. She had a total of 4 negative COVID tests ( I dont have these test results- done at [facility])     Her asthma is described as mild and has been taking advair age 6-7 years old. She never had oral candidiasis with advair and did not used advair during this period in Dec 2020.  No history of bad allergy reactions to drugs or environmental allergens, other vaccines. No history of recurrent skin rashes/ alergic reactions.     In March 2020, she fell ill with nightsweats, muscle aches, fatigue, lost taste, rigors but no fever. her son who went to , was coughing . Her  had similar symptoms. Her symptoms lasted for about 10 days. On day 12 ( 3/26/20)  , her COVID19 PCR was negative. Her son also diagnosed with Roseola and Hand , foot, mouth disease around halloween day (2020)    She has become hypervigilant and has been adhering to good oral care, using Up and Up mouth wash regularly . she had another episode of \"thrush/stomatitis \" and had to use nystatin s/s and Flucoanzole x 14 days. stomatitis resolved around day 8-9. no respiratory " "symptoms. She used to see her dentist on a regular basis but not in the past year due to COVID19.       Otherwise, she is feeling well, with good energy level, stable weight.   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 Her other concern is that she is trying to get pregnant and is concerned about the recurrent \"thrush\"            ROS:  CONSTITUTIONAL: see HPI   INTEGUMENTARY/SKIN: NEGATIVE for worrisome rashes, moles or lesions  EYES: NEGATIVE for vision changes or irritation  ENT/MOUTH: see HPI   RESP:NEGATIVE for significant cough or SOB  CV: NEGATIVE for chest pain, palpitations or peripheral edema  GI: NEGATIVE for nausea, abdominal pain, heartburn, or change in bowel habits  : NEGATIVE for urinary frequency, hematuria or dysuria  MUSCULOSKELETAL: NEGATIVE for significant arthralgias or myalgia  NEURO: NEGATIVE for weakness, dizziness or paresthesias  ENDOCRINE: NEGATIVE for temperature intolerance, skin/hair changes  HEME/ALLERGY/IMMUNE: NEGATIVE for bleeding problems  PSYCHIATRIC: NEGATIVE for changes in mood or affect    Problem list and histories reviewed & adjusted, as indicated.  Additional history: as documented    PAST MEDICAL HISTORY:  Patient  has a past medical history of Asthma and NO ACTIVE PROBLEMS.    PAST SURGICAL HISTORY:  Patient  has a past surgical history that includes wisdom teeth; Lasik bilateral (Bilateral, 2016); REPAIR CRUCIATE LIGAMENT,KNEE (Right); and  section (N/A, 2019).    CURRENT MEDICATIONS:  Current Outpatient Medications   Medication Sig Dispense Refill     fluticasone-salmeterol (ADVAIR DISKUS) 250-50 MCG/DOSE inhaler INHALE ONE PUFF BY MOUTH TWICE A DAY 3 Inhaler 0     levonorgestrel (MIRENA, 52 MG,) 20 MCG/24HR IUD Mirena 20 mcg/24 hours (6 yrs) 52 mg intrauterine device   Take by intrauterine route.       fluconazole (DIFLUCAN) 200 MG tablet Take 1 tablet (200 mg) by mouth daily (Patient not taking: Reported on 2021) 14 " tablet 0     nystatin (MYCOSTATIN) 901087 UNIT/ML suspension Swish and spit 4x daily x 5 days (Patient not taking: Reported on 2021) 400 mL 0     Prenatal Multivit-Min-Fe-FA (PRE-SHERON PO)        ALLERGY:  Allergies   Allergen Reactions     Covid-19 (Adenovirus) Vaccine Blisters     IMMUNIZATION HISTORY:  Immunization History   Administered Date(s) Administered     COVID-19,PF,Pfizer 2020     DTaP, Unspecified 2019     Flu, Unspecified 10/01/2019     HEPA 2011, 2013     HPV 2008, 07/10/2008, 2009     HepB 1992, 1993, 1996     HepB-Adult 2018     Hib (PRP-T) 1992, 1992, 1992, 1993     Historical DTP/aP 1992, 1992, 1992, 1993, 12/15/1995     Influenza (IIV3) PF 12/15/2000, 10/15/2011, 2012, 10/01/2018     Influenza Vaccine IM > 6 months Valent IIV4 10/04/2019     MMR 1993, 2002     Mantoux Tuberculin Skin Test 2020     Meningococcal (Menomune ) 2005, 2011     OPV, trivalent, live 1992, 1992, 1993, 12/15/1995     Pneumococcal 23 valent 2012     TD (ADULT, 7+) 2003     TDAP Vaccine (Adacel) 2017     Tdap (Adacel,Boostrix) 2006     SOCIAL HISTORY:  Patient  reports that she has never smoked. She has never used smokeless tobacco. She reports previous alcohol use. She reports that she does not use drugs.  , one child ( 18 months old son)  RN in the ICU at [employer]     FAMILY HISTORY:  Patient's family history includes Alzheimer Disease in her paternal grandmother; Angina in her maternal grandfather; Asthma in her mother; Colon Cancer (age of onset: 80) in her paternal grandfather; Colon Polyps in her father and paternal aunt; Heart Failure in her paternal grandfather; Hypertension in her father; Hypothyroidism in her mother; Pacemaker in her paternal grandfather.    Labs reviewed in EPIC    OBJECTIVE:                                                     GENERAL: healthy, alert, oriented and no distress  EYES: Eyes grossly normal to inspection  HENT: oral: tongue - greyish patch/ ?geographic tongue . tongue is not swollen. ( reviewed photos )    NECK: supple. no swelling noted  RESP: no signs of shortness of breath, cough, speaks in full sentences  MS: no gross musculoskeletal defects noted, no edema  SKIN: no suspicious lesions or rashes  NEURO: mentation intact and speech normal  PSYCH: mentation appears normal, affect normal       ASSESSMENT AND PLAN:                                                      1. Reaction to Pfizer COVID19 vaccine   - symptoms - glossitis, gingivitis, oral mucositis, enlarged right cervical lymphadenopathy ( reviewed photos scanned in EPIC , looks like glossitis / stomatitis/ mucositis,  and perhaps not candidiasis)   - reaction possibly due to hyper immune response to Pfizer vaccine   - no advair / antibiotics use around time of illness.   - will need to evaluate for other triggers for mucositis/ glossitis - infection / allergy/ immune system / injury/ irritation/ / skin disease / nutritional deficiencies, stress/ medications    2. History of asthma ( mild)   - uses advair on a PRN basis     Plan/Recommendations  - if this symptoms recur, will do scrapings and sent to lab to check for candidiasis   - advised to see her dentist to do a thorough oral exam    - further work-up if it is oral candidiasis   - I will review lab result from [place] once this is made available in her chart/EPIC. she will scan the results to [facility]    further recommendations to follow once I have the outside test results     Thank You for allowing me to participate in the care of this patient    Guido Miranda MD, M.Med.Sc  Division of Infectious Diseases and International Medicine  [facility]

## 2021-05-07 NOTE — PROGRESS NOTES
"Cindi is a 30 year old who is being evaluated via a billable video visit.      How would you like to obtain your AVS? MyChart  If the video visit is dropped, the invitation should be resent by: Text to cell phone: [phone]  Will anyone else be joining your video visit? No    Video-Visit Details  Video Time: 47 min   Originating Location (pt. Location): Home  Distant Location (provider location):  [facility] INFECTI[facility] DISEASE CLIN[facility] [place]   Platform used for Video Visit: [employer]    INFECTIOUS DISEASES CONSULTATION  NOTE  Consult requested by: self referred   Reason for Consult : thrush on tongue  Date of Consult: 5/7/21    HISTORY OF PRESENT ILLNESS:                                                    Cindi Padilla is a 30 year old female with history of mild asthma ( uses advair on as needed basis), cold sores, with chief concern of oral thrush after Pfizer COVID19 vaccine.     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 She received first dose of Pfizer COVID19 vaccine on 12/21/20 and the next 2 days she had flu like symptoms with painful right cervical lymphadenopathy , fatigue and scratchy throat. on the 12/25, her gum was swollen and painful , fever was up to 102F with chills and she took tylenol and Ibuprofen round the clock.No cough, shortness of breath.  on 12/26, she still had fever of 102F, mild headaches and HR in 130s and described \"oral stomatitis\" and sore throat with sores in her mouth. she described the oral mucosal membrane and tongue as red with white patches, she refers this as oral candidiasis. very painful to eat and drink. denies dental pain. no nausea, vomiting, diarrhea, cough, shortness of breath. She lost about 12 lbs during this time. She went to urgent care on 12/26/21 and tested negative for COVID19 (PCR) and rapid Strep. ( I dont have the test results). She called [facility] COVID19 line and spoke with Lachelle Pina CNP " "on 12/27/20.  She was diagnosed with oral candidiasis and  was prescribed with nystatin swish and swallow x 7 days. \".candidiasis\" did not subside and she still could not eat. She had a video follow-up with Liliana Coates MD on 12/29/21 and again diagnosed with oral candidiasis and prescribed Fluconazole 200 mg po daily x 14 days. 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 oral sores / candidiasis resolved at the end of January 2021 and enlarged cervical lymphnodes normalized in March 2021. She had a total of 4 negative COVID tests ( I dont have these test results- done at [facility])     Her asthma is described as mild and has been taking advair age 6-7 years old. She never had oral candidiasis with advair and did not used advair during this period in Dec 2020.  No history of bad allergy reactions to drugs or environmental allergens, other vaccines. No history of recurrent skin rashes/ alergic reactions.     In March 2020, she fell ill with nightsweats, muscle aches, fatigue, lost taste, rigors but no fever. her son who went to , was coughing . Her  had similar symptoms. Her symptoms lasted for about 10 days. On day 12 ( 3/26/20)  , her COVID19 PCR was negative. Her son also diagnosed with Roseola and Hand , foot, mouth disease around halloween day (2020)    She has become hypervigilant and has been adhering to good oral care, using Up and Up mouth wash regularly . she had another episode of \"thrush/stomatitis \" and had to use nystatin s/s and Flucoanzole x 14 days. stomatitis resolved around day 8-9. no respiratory symptoms. She used to see her dentist on a regular basis but not in the past year due to COVID19.       Otherwise, she is feeling well, with good energy level, stable weight.   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 Her other concern is that she is trying to get pregnant and is " "concerned about the recurrent \"thrush\"            ROS:  CONSTITUTIONAL: see HPI   INTEGUMENTARY/SKIN: NEGATIVE for worrisome rashes, moles or lesions  EYES: NEGATIVE for vision changes or irritation  ENT/MOUTH: see HPI   RESP:NEGATIVE for significant cough or SOB  CV: NEGATIVE for chest pain, palpitations or peripheral edema  GI: NEGATIVE for nausea, abdominal pain, heartburn, or change in bowel habits  : NEGATIVE for urinary frequency, hematuria or dysuria  MUSCULOSKELETAL: NEGATIVE for significant arthralgias or myalgia  NEURO: NEGATIVE for weakness, dizziness or paresthesias  ENDOCRINE: NEGATIVE for temperature intolerance, skin/hair changes  HEME/ALLERGY/IMMUNE: NEGATIVE for bleeding problems  PSYCHIATRIC: NEGATIVE for changes in mood or affect    Problem list and histories reviewed & adjusted, as indicated.  Additional history: as documented    PAST MEDICAL HISTORY:  Patient  has a past medical history of Asthma and NO ACTIVE PROBLEMS.    PAST SURGICAL HISTORY:  Patient  has a past surgical history that includes wisdom teeth; Lasik bilateral (Bilateral, 2016); REPAIR CRUCIATE LIGAMENT,KNEE (Right); and  section (N/A, 2019).    CURRENT MEDICATIONS:  Current Outpatient Medications   Medication Sig Dispense Refill     fluticasone-salmeterol (ADVAIR DISKUS) 250-50 MCG/DOSE inhaler INHALE ONE PUFF BY MOUTH TWICE A DAY 3 Inhaler 0     levonorgestrel (MIRENA, 52 MG,) 20 MCG/24HR IUD Mirena 20 mcg/24 hours (6 yrs) 52 mg intrauterine device   Take by intrauterine route.       fluconazole (DIFLUCAN) 200 MG tablet Take 1 tablet (200 mg) by mouth daily (Patient not taking: Reported on 2021) 14 tablet 0     nystatin (MYCOSTATIN) [id number] UNIT/ML suspension Swish and spit 4x daily x 5 days (Patient not taking: Reported on 2021) 400 mL 0     Prenatal Multivit-Min-Fe-FA (PRE-SHERON PO)        ALLERGY:  Allergies   Allergen Reactions     Covid-19 (Adenovirus) Vaccine Blisters     IMMUNIZATION " HISTORY:  Immunization History   Administered Date(s) Administered     COVID-19,PF,Pfizer 12/21/2020     DTaP, Unspecified 05/24/2019     Flu, Unspecified 10/01/2019     HEPA 02/24/2011, 01/03/2013     HPV 03/28/2008, 07/10/2008, 03/24/2009     HepB 09/21/1992, 12/13/1993, 06/19/1996     HepB-Adult 11/12/2018     Hib (PRP-T) 01/29/1992, 03/31/1992, 05/27/1992, 03/04/1993     Historical DTP/aP 01/29/1992, 03/31/1992, 05/27/1992, 03/04/1993, 12/15/1995     Influenza (IIV3) PF 12/15/2000, 10/15/2011, 12/21/2012, 10/01/2018     Influenza Vaccine IM > 6 months Valent IIV4 10/04/2019     MMR 03/04/1993, 01/25/2002     Mantoux Tuberculin Skin Test 07/20/2020     Meningococcal (Menomune ) 09/14/2005, 05/05/2011     OPV, trivalent, live 01/29/1992, 03/31/1992, 03/04/1993, 12/15/1995     Pneumococcal 23 valent 03/12/2012     TD (ADULT, 7+) 08/07/2003     TDAP Vaccine (Adacel) 03/07/2017     Tdap (Adacel,Boostrix) 08/02/2006     SOCIAL HISTORY:  Patient  reports that she has never smoked. She has never used smokeless tobacco. She reports previous alcohol use. She reports that she does not use drugs.  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    Labs reviewed in EPIC    OBJECTIVE:                                                    GENERAL: healthy, alert, oriented and no distress  EYES: Eyes grossly normal to inspection  HENT: oral: tongue - greyish patch/ ?geographic tongue . tongue is not swollen. ( reviewed photos )    NECK: supple. no swelling noted  RESP: no signs of shortness of breath, cough, speaks  in full sentences  MS: no gross musculoskeletal defects noted, no edema  SKIN: no suspicious lesions or rashes  NEURO: mentation intact and speech normal  PSYCH: mentation appears normal, affect normal       ASSESSMENT AND PLAN:                                                      1. Reaction to Pfizer COVID19 vaccine   - symptoms - glossitis, gingivitis, oral mucositis, enlarged right cervical lymphadenopathy ( reviewed photos scanned in EPIC , looks like glossitis / stomatitis/ mucositis,  and perhaps not candidiasis)   - reaction possibly due to hyper immune response to Pfizer vaccine   - no advair / antibiotics use around time of illness.   - will need to evaluate for other triggers for mucositis/ glossitis - infection / allergy/ immune system / injury/ irritation/ / skin disease / nutritional deficiencies, stress/ medications    2. History of asthma ( mild)   - uses advair on a PRN basis     Plan/Recommendations  - if this symptoms recur, will do scrapings and sent to lab to check for candidiasis   - advised to see her dentist to do a thorough oral exam    - further work-up if it is oral candidiasis   - I will review lab result from [place] once this is made available in her chart/EPIC. she will scan the results to [facility]    further recommendations to follow once I have the outside test results     Thank You for allowing me to participate in the care of this patient    Guido Miranda MD, M.Med.Sc  Division of Infectious Diseases and International Medicine  [facility]

## 2021-06-03 ENCOUNTER — OFFICE VISIT (OUTPATIENT)
Dept: FAMILY MEDICINE | Facility: CLINIC | Age: 31
End: 2021-06-03
Payer: COMMERCIAL

## 2021-06-03 ENCOUNTER — TELEPHONE (OUTPATIENT)
Dept: FAMILY MEDICINE | Facility: CLINIC | Age: 31
End: 2021-06-03

## 2021-06-03 VITALS
OXYGEN SATURATION: 97 % | WEIGHT: 136 LBS | TEMPERATURE: 97.9 F | SYSTOLIC BLOOD PRESSURE: 120 MMHG | DIASTOLIC BLOOD PRESSURE: 64 MMHG | HEIGHT: 66 IN | BODY MASS INDEX: 21.86 KG/M2 | RESPIRATION RATE: 12 BRPM | HEART RATE: 68 BPM

## 2021-06-03 DIAGNOSIS — Z00.00 ROUTINE GENERAL MEDICAL EXAMINATION AT A HEALTH CARE FACILITY: ICD-10-CM

## 2021-06-03 DIAGNOSIS — B37.0 THRUSH: Primary | ICD-10-CM

## 2021-06-03 DIAGNOSIS — T50.Z95A: ICD-10-CM

## 2021-06-03 DIAGNOSIS — J45.40 MODERATE PERSISTENT ASTHMA WITHOUT COMPLICATION: ICD-10-CM

## 2021-06-03 LAB
KOH PREP SPEC: NORMAL
SPECIMEN SOURCE: NORMAL

## 2021-06-03 PROCEDURE — 99395 PREV VISIT EST AGE 18-39: CPT | Mod: 25 | Performed by: PHYSICIAN ASSISTANT

## 2021-06-03 PROCEDURE — 99213 OFFICE O/P EST LOW 20 MIN: CPT | Mod: 25 | Performed by: PHYSICIAN ASSISTANT

## 2021-06-03 PROCEDURE — 87220 TISSUE EXAM FOR FUNGI: CPT | Performed by: PHYSICIAN ASSISTANT

## 2021-06-03 RX ORDER — ALBUTEROL SULFATE 90 UG/1
2 AEROSOL, METERED RESPIRATORY (INHALATION) EVERY 6 HOURS
Qty: 18 G | Refills: 1 | Status: SHIPPED | OUTPATIENT
Start: 2021-06-03 | End: 2021-11-11

## 2021-06-03 ASSESSMENT — MIFFLIN-ST. JEOR: SCORE: 1358.64

## 2021-06-03 ASSESSMENT — PAIN SCALES - GENERAL: PAINLEVEL: NO PAIN (0)

## 2021-06-03 NOTE — TELEPHONE ENCOUNTER
Please call Cindi with the results of her lab.  Her KOH is negative for fungal elements on her tongue. This indicates that she does not have thrush currently.  Next step as we discussed today is for her to follow up with her dentist next week for appropriate oral examination

## 2021-06-03 NOTE — TELEPHONE ENCOUNTER
S/w pt and gave Sherif's reply about lab results    Pt states understanding.    Tere RAMIRES RN  EP Triage

## 2021-06-03 NOTE — PROGRESS NOTES
SUBJECTIVE:   CC: Cindi Padilla is an 30 year old woman who presents for preventive health visit.       Patient has been advised of split billing requirements and indicates understanding: Yes  Healthy Habits:    Do you get at least three servings of calcium containing foods daily (dairy, green leafy vegetables, etc.)? yes    Amount of exercise or daily activities, outside of work: 5-6 times weekly for 30 min to an hour    Problems taking medications regularly No    Medication side effects: fatigue and dizziness with fluconazol    Have you had an eye exam in the past two years? no    Do you see a dentist twice per year? no    Do you have sleep apnea, excessive snoring or daytime drowsiness?no      Cindi continues to experience discoloration of her tongue and notices intermittent white spots on her tongue during times of illness.  These symptoms first began earlier this year just following her first COVID vaccination.  She was treated for thrush and then treated for recurrent thrush when symptoms returned. When symptoms did not fully resolve she was seen by ID who recommended definitive diagnosis and further workup if chronic thrush.  She has not other systematic symptoms. She no longer has tongue pain, discomfort, bleeding or changes in sense of taste, she only notices a whitish discoloration of her tongue.     Today's PHQ-2 Score:   PHQ-2 ( 1999 Pfizer) 5/7/2021 1/9/2020   Q1: Little interest or pleasure in doing things 0 0   Q2: Feeling down, depressed or hopeless 0 0   PHQ-2 Score 0 0       Abuse: Current or Past(Physical, Sexual or Emotional)- No  Do you feel safe in your environment? Yes    Have you ever done Advance Care Planning? (For example, a Health Directive, POLST, or a discussion with a medical provider or your loved ones about your wishes): No, advance care planning information given to patient to review.  Patient declined advance care planning discussion at this time.    Social History     Tobacco  Use     Smoking status: Never Smoker     Smokeless tobacco: Never Used   Substance Use Topics     Alcohol use: Not Currently     Comment: not while pregnant     If you drink alcohol do you typically have >3 drinks per day or >7 drinks per week? No                     Reviewed orders with patient.  Reviewed health maintenance and updated orders accordingly - Yes  Patient Active Problem List   Diagnosis     Mild intermittent asthma     CARDIOVASCULAR SCREENING; LDL GOAL LESS THAN 160     Dairy product intolerance     Labor and delivery, indication for care     Normal labor and delivery     Family history of malignant neoplasm of ovary     Thrush, oral     Vaccine or biological substance causing adverse effect in therapeutic use, initial encounter     Past Surgical History:   Procedure Laterality Date     C REPAIR CRUCIATE LIGAMENT,KNEE Right     No current issues      SECTION N/A 2019    Procedure:  SECTION;  Surgeon: Herminio Phoenix MD;  Location: [facility]     LASIK BILATERAL Bilateral 2016     wisdom teeth         Social History     Tobacco Use     Smoking status: Never Smoker     Smokeless tobacco: Never Used   Substance Use Topics     Alcohol use: Not Currently     Comment: not while pregnant     Family History   Problem Relation Age of Onset     Asthma Mother      Hypothyroidism Mother      Hypertension Father         well-controlled     Colon Polyps Father      Angina Maternal Grandfather         no h/o MI     Alzheimer Disease Paternal Grandmother         passed from kidney stone leading to sepsis     Colon Cancer Paternal Grandfather 80        did not pass from this     Pacemaker Paternal Grandfather      Heart Failure Paternal Grandfather      Colon Polyps Paternal Aunt      Breast Cancer No family hx of          Current Outpatient Medications   Medication Sig Dispense Refill     albuterol (PROAIR HFA/PROVENTIL HFA/VENTOLIN HFA) 108 (90 Base) MCG/ACT inhaler Inhale 2 puffs into  the lungs every 6 hours 18 g 1     fluticasone-salmeterol (ADVAIR DISKUS) 250-50 MCG/DOSE inhaler INHALE ONE PUFF BY MOUTH TWICE A DAY 60 each 1     levonorgestrel (MIRENA, 52 MG,) 20 MCG/24HR IUD Mirena 20 mcg/24 hours (6 yrs) 52 mg intrauterine device   Take by intrauterine route.       Prenatal Multivit-Min-Fe-FA (PRE- PO)        Allergies   Allergen Reactions     Covid-19 (Adenovirus) Vaccine Blisters     Recent Labs   Lab Test 21  1304 18   ALT 15  --    CR 0.71 1.02   GFRESTIMATED >90 65   GFRESTBLACK >90 79   POTASSIUM 4.8 3.9        FSH-7: No flowsheet data found.  Patient under 40 years of age: Routine Mammogram Screening not recommended.   Pertinent mammograms are reviewed under the imaging tab.    Pertinent mammograms are reviewed under the imaging tab.  History of abnormal Pap smear: NO - age 30 PAP every 3 years  recommended     Reviewed and updated as needed this visit by clinical staff  Tobacco  Allergies   Problems             Reviewed and updated as needed this visit by Provider     Problems            Past Medical History:   Diagnosis Date     Asthma     Lifelong, currently stable.  No inhaler use for 3months, and infrequent use during preg (20)     NO ACTIVE PROBLEMS       Past Surgical History:   Procedure Laterality Date     C REPAIR CRUCIATE LIGAMENT,KNEE Right     No current issues      SECTION N/A 2019    Procedure:  SECTION;  Surgeon: Herminio Phoenix MD;  Location:  L[facility]D     LASIK BILATERAL Bilateral 2016     wisdom teeth         ROS:  CONSTITUTIONAL: NEGATIVE for fever, chills, change in weight  INTEGUMENTARU/SKIN: NEGATIVE for worrisome rashes, moles or lesions  EYES: NEGATIVE for vision changes or irritation  ENT: NEGATIVE for ear, mouth and throat problems  RESP: NEGATIVE for significant cough or SOB  BREAST: NEGATIVE for masses, tenderness or discharge  CV: NEGATIVE for chest pain, palpitations or peripheral edema  GI:  "NEGATIVE for nausea, abdominal pain, heartburn, or change in bowel habits  : NEGATIVE for unusual urinary or vaginal symptoms. Periods are regular.  MUSCULOSKELETAL: NEGATIVE for significant arthralgias or myalgia  NEURO: NEGATIVE for weakness, dizziness or paresthesias  PSYCHIATRIC: NEGATIVE for changes in mood or affect    OBJECTIVE:   /64   Pulse 68   Temp 97.9  F (36.6  C) (Tympanic)   Resp 12   Ht 1.676 m (5' 6\")   Wt 61.7 kg (136 lb)   SpO2 97%   BMI 21.95 kg/m    EXAM:  GENERAL: healthy, alert and no distress  EYES: Eyes grossly normal to inspection, PERRL and conjunctivae and sclerae normal  HENT: ear canals and TM's normal, nose and mouth without ulcers or lesions  NECK: no adenopathy, no asymmetry, masses, or scars and thyroid normal to palpation  RESP: lungs clear to auscultation - no rales, rhonchi or wheezes  CV: regular rate and rhythm, normal S1 S2, no S3 or S4, no murmur  ABDOMEN: soft, nontender, no hepatosplenomegaly, no masses and bowel sounds normal  MS: no gross musculoskeletal defects noted, no edema  SKIN: yellowish discoloration of tongue that is diffuse, this does not easily scrape off during examination.   NEURO: Normal strength and tone, mentation intact and speech normal  PSYCH: mentation appears normal, affect normal/bright    Diagnostic Test Results:  none     ASSESSMENT/PLAN:   1. Routine general medical examination at a health care facility    2. Thrush  Symptoms seem inconsistent with thrush given that she has no pain, discomfort, changes in taste etc.  KOH done today to determine whether ongoing symptoms represent persistent thrush, I will contact her with results.  IF so, further workup should be considered.  If negative for fungal elements, I have advised that she monitor for new symptoms and follow up as planned next week with her dentist  - KOH prep (skin, hair or nails only)    3. Moderate persistent asthma without complication  Well controlled  - " "fluticasone-salmeterol (ADVAIR DISKUS) 250-50 MCG/DOSE inhaler; INHALE ONE PUFF BY MOUTH TWICE A DAY  Dispense: 60 each; Refill: 1  - albuterol (PROAIR HFA/PROVENTIL HFA/VENTOLIN HFA) 108 (90 Base) MCG/ACT inhaler; Inhale 2 puffs into the lungs every 6 hours  Dispense: 18 g; Refill: 1        5. Vaccine or biological substance causing adverse effect in therapeutic use, initial encounter  See note.  Reaction to COVID vaccine #1 ( Pfizer)      Reviewed preventive health counseling, as reflected in patient instructions       Regular exercise       Healthy diet/nutrition    Estimated body mass index is 21.95 kg/m  as calculated from the following:    Height as of this encounter: 1.676 m (5' 6\").    Weight as of this encounter: 61.7 kg (136 lb).        She reports that she has never smoked. She has never used smokeless tobacco.      Counseling Resources:  ATP IV Guidelines  Pooled Cohorts Equation Calculator  Breast Cancer Risk Calculator  BRCA-Related Cancer Risk Assessment: FHS-7 Tool  FRAX Risk Assessment  ICSI Preventive Guidelines  Dietary Guidelines for Americans, 2010  USDA's MyPlate  ASA Prophylaxis  Lung CA Screening    [facility] [place]  "

## 2021-06-04 ASSESSMENT — ASTHMA QUESTIONNAIRES: ACT_TOTALSCORE: 23

## 2021-06-09 ENCOUNTER — APPOINTMENT (OUTPATIENT)
Dept: URBAN - METROPOLITAN AREA CLINIC 257 | Age: 31
Setting detail: DERMATOLOGY
End: 2021-06-10

## 2021-06-09 DIAGNOSIS — D22 MELANOCYTIC NEVI: ICD-10-CM

## 2021-06-09 DIAGNOSIS — L81.4 OTHER MELANIN HYPERPIGMENTATION: ICD-10-CM

## 2021-06-09 DIAGNOSIS — L57.8 OTHER SKIN CHANGES DUE TO CHRONIC EXPOSURE TO NONIONIZING RADIATION: ICD-10-CM

## 2021-06-09 DIAGNOSIS — Z71.89 OTHER SPECIFIED COUNSELING: ICD-10-CM

## 2021-06-09 DIAGNOSIS — L21.8 OTHER SEBORRHEIC DERMATITIS: ICD-10-CM

## 2021-06-09 DIAGNOSIS — D18.0 HEMANGIOMA: ICD-10-CM

## 2021-06-09 PROBLEM — D48.5 NEOPLASM OF UNCERTAIN BEHAVIOR OF SKIN: Status: ACTIVE | Noted: 2021-06-09

## 2021-06-09 PROBLEM — D18.01 HEMANGIOMA OF SKIN AND SUBCUTANEOUS TISSUE: Status: ACTIVE | Noted: 2021-06-09

## 2021-06-09 PROBLEM — D22.5 MELANOCYTIC NEVI OF TRUNK: Status: ACTIVE | Noted: 2021-06-09

## 2021-06-09 PROCEDURE — 99203 OFFICE O/P NEW LOW 30 MIN: CPT | Mod: 25

## 2021-06-09 PROCEDURE — OTHER MIPS QUALITY: OTHER

## 2021-06-09 PROCEDURE — OTHER SHAVE REMOVAL: OTHER

## 2021-06-09 PROCEDURE — 88305 TISSUE EXAM BY PATHOLOGIST: CPT

## 2021-06-09 PROCEDURE — OTHER PATHOLOGY BILLING: OTHER

## 2021-06-09 PROCEDURE — 11301 SHAVE SKIN LESION 0.6-1.0 CM: CPT

## 2021-06-09 PROCEDURE — OTHER PRESCRIPTION: OTHER

## 2021-06-09 PROCEDURE — OTHER COUNSELING: OTHER

## 2021-06-09 RX ORDER — KETOCONAZOLE 20 MG/ML
SHAMPOO, SUSPENSION TOPICAL TIW
Qty: 1 | Refills: 2 | Status: ERX | COMMUNITY
Start: 2021-06-09

## 2021-06-09 ASSESSMENT — LOCATION ZONE DERM
LOCATION ZONE: ARM
LOCATION ZONE: SCALP
LOCATION ZONE: TRUNK

## 2021-06-09 ASSESSMENT — LOCATION SIMPLE DESCRIPTION DERM
LOCATION SIMPLE: LEFT UPPER ARM
LOCATION SIMPLE: UPPER BACK
LOCATION SIMPLE: ANTERIOR SCALP
LOCATION SIMPLE: LEFT UPPER BACK

## 2021-06-09 ASSESSMENT — LOCATION DETAILED DESCRIPTION DERM
LOCATION DETAILED: LEFT ANTERIOR DISTAL UPPER ARM
LOCATION DETAILED: MID-FRONTAL SCALP
LOCATION DETAILED: LEFT MEDIAL UPPER BACK
LOCATION DETAILED: INFERIOR THORACIC SPINE
LOCATION DETAILED: LEFT INFERIOR MEDIAL UPPER BACK

## 2021-06-09 NOTE — PROCEDURE: MIPS QUALITY
Quality 130: Documentation Of Current Medications In The Medical Record: Current Medications Documented
Quality 110: Preventive Care And Screening: Influenza Immunization: Influenza Immunization Ordered or Recommended, but not Administered due to system reason
Detail Level: Generalized
Quality 226: Preventive Care And Screening: Tobacco Use: Screening And Cessation Intervention: Patient screened for tobacco use and is an ex/non-smoker

## 2021-06-09 NOTE — PROCEDURE: SHAVE REMOVAL
Consent was obtained from the patient. The risks and benefits to therapy were discussed in detail. Specifically, the risks of infection, scarring, bleeding, prolonged wound healing, incomplete removal, allergy to anesthesia, nerve injury and recurrence were addressed. Prior to the procedure, the treatment site was clearly identified and confirmed by the patient. All components of Universal Protocol/PAUSE Rule completed.
Billing Type: Third-Party Bill
Hemostasis: Drysol
Bill 47697 For Specimen Handling/Conveyance To Laboratory?: no
Size Of Lesion In Cm (Required): 0.6
X Size Of Lesion In Cm (Optional): 0
Notification Instructions: Patient will be notified of pathology results. However, patient instructed to call the office if not contacted within 2 weeks.
Medical Necessity Clause: This procedure was medically necessary because the lesion that was treated was:
Medical Necessity Information: It is in your best interest to select a reason for this procedure from the list below. All of these items fulfill various CMS LCD requirements except the new and changing color options.
Path Notes (To The Dermatopathologist): Please check margins.
Post-Care Instructions: I reviewed with the patient in detail post-care instructions. Patient is to keep the biopsy site dry overnight, and then apply bacitracin twice daily until healed. Patient may apply hydrogen peroxide soaks to remove any crusting.
Detail Level: Detailed
Biopsy Method: Dermablade
Anesthesia Type: 1% lidocaine with epinephrine
Wound Care: Petrolatum
Was A Bandage Applied: Yes

## 2021-06-09 NOTE — PROCEDURE: PATHOLOGY BILLING
Immunohistochemistry (14701 and 32788) billing is not performed here. Please use the Immunohistochemistry Stain Billing plan to accomplish this. Immunohistochemistry (90938 and 98401) billing is not performed here. Please use the Immunohistochemistry Stain Billing plan to accomplish this.

## 2021-07-01 DIAGNOSIS — J45.40 MODERATE PERSISTENT ASTHMA WITHOUT COMPLICATION: ICD-10-CM

## 2021-07-02 NOTE — TELEPHONE ENCOUNTER
Routing refill request to provider for review/approval because:  Drug not active on patient's medication list  Order for Serevent, Striverdi, or Foradil and pt has steroid inhaler    Keke Mack RN

## 2021-08-31 ENCOUNTER — OFFICE VISIT (OUTPATIENT)
Dept: FAMILY MEDICINE | Facility: CLINIC | Age: 31
End: 2021-08-31
Payer: COMMERCIAL

## 2021-08-31 VITALS
SYSTOLIC BLOOD PRESSURE: 120 MMHG | RESPIRATION RATE: 16 BRPM | BODY MASS INDEX: 22.27 KG/M2 | OXYGEN SATURATION: 99 % | WEIGHT: 138 LBS | TEMPERATURE: 97.4 F | DIASTOLIC BLOOD PRESSURE: 80 MMHG | HEART RATE: 87 BPM

## 2021-08-31 DIAGNOSIS — Z11.59 NEED FOR HEPATITIS C SCREENING TEST: ICD-10-CM

## 2021-08-31 DIAGNOSIS — F43.23 ADJUSTMENT DISORDER WITH MIXED ANXIETY AND DEPRESSED MOOD: ICD-10-CM

## 2021-08-31 DIAGNOSIS — R53.83 OTHER FATIGUE: ICD-10-CM

## 2021-08-31 DIAGNOSIS — R11.0 NAUSEA: Primary | ICD-10-CM

## 2021-08-31 LAB
ERYTHROCYTE [DISTWIDTH] IN BLOOD BY AUTOMATED COUNT: 12 % (ref 10–15)
HCT VFR BLD AUTO: 44.6 % (ref 35–47)
HGB BLD-MCNC: 15.6 G/DL (ref 11.7–15.7)
MCH RBC QN AUTO: 32.2 PG (ref 26.5–33)
MCHC RBC AUTO-ENTMCNC: 35 G/DL (ref 31.5–36.5)
MCV RBC AUTO: 92 FL (ref 78–100)
PLATELET # BLD AUTO: 229 10E3/UL (ref 150–450)
RBC # BLD AUTO: 4.84 10E6/UL (ref 3.8–5.2)
WBC # BLD AUTO: 7.8 10E3/UL (ref 4–11)

## 2021-08-31 PROCEDURE — 80053 COMPREHEN METABOLIC PANEL: CPT | Performed by: FAMILY MEDICINE

## 2021-08-31 PROCEDURE — 82306 VITAMIN D 25 HYDROXY: CPT | Performed by: FAMILY MEDICINE

## 2021-08-31 PROCEDURE — 86803 HEPATITIS C AB TEST: CPT | Performed by: FAMILY MEDICINE

## 2021-08-31 PROCEDURE — 99214 OFFICE O/P EST MOD 30 MIN: CPT | Performed by: FAMILY MEDICINE

## 2021-08-31 PROCEDURE — 36415 COLL VENOUS BLD VENIPUNCTURE: CPT | Performed by: FAMILY MEDICINE

## 2021-08-31 PROCEDURE — 84443 ASSAY THYROID STIM HORMONE: CPT | Performed by: FAMILY MEDICINE

## 2021-08-31 PROCEDURE — 85027 COMPLETE CBC AUTOMATED: CPT | Performed by: FAMILY MEDICINE

## 2021-08-31 RX ORDER — SERTRALINE HYDROCHLORIDE 25 MG/1
25 TABLET, FILM COATED ORAL DAILY
Qty: 30 TABLET | Refills: 1 | Status: SHIPPED | OUTPATIENT
Start: 2021-08-31 | End: 2021-09-29

## 2021-08-31 ASSESSMENT — PAIN SCALES - GENERAL: PAINLEVEL: NO PAIN (0)

## 2021-08-31 NOTE — PATIENT INSTRUCTIONS
Patient Education     Your Body s Response to Anxiety  Normal anxiety is part of the body s natural defense system. It's an alert to a threat that is unknown, vague, or comes from your own internal fears. While you re in this state, your feelings can range from a vague sense of worry to physical sensations such as a pounding heartbeat. These feelings make you want to react to the threat. An anxiety response is normal in many situations. But when you have an anxiety disorder, the same response can occur at the wrong times.   Anxiety can be helpful  Normal anxiety is a signal from your brain. It warns you of a threat. It's a normal response to help you prevent something. Or to decrease the bad effects of something you can't control. For example, anxiety is a normal response to situations that might harm your body, separate you from a loved one, or lose your job. The symptoms of anxiety can be physical and mental.   How does it feel?  People with anxiety may have:    Dizziness    Muscle tension or pain    Restlessness    Sleeplessness    Trouble focusing    Racing heartbeat    Fast breathing    Shaking or trembling    Stomachache    Diarrhea    Loss of energy    Sweating    Cold, clammy hands    Chest pain    Dry mouth  Anxiety can also be a problem  Anxiety can become a problem when it is hard to control, occurs for months, and interferes with important parts of your life. With an anxiety disorder, your body has the response described above, but in inappropriate ways. The response a person has depends on the anxiety disorder he or she has. With some disorders, the anxiety is way out of proportion to the threat that triggers it. With others, anxiety may occur even when there isn t a clear threat or trigger.   Who does it affect?  Some people are more likely to have lasting anxiety than others. It tends to run in families. And it affects more younger people than older people, and more women than men. But no age, race,  or gender is immune to anxiety problems.   Anxiety can be treated  The good news is that the anxiety that s disrupting your life can be treated. Check with your healthcare provider and rule out any physical problems that may be causing the anxiety symptoms. If an anxiety disorder is diagnosed, seek mental healthcare. This is an illness and it can respond to treatment. Most types of anxiety disorders will respond to talk therapy (counseling) and medicines. Working with your doctor or other healthcare provider, you can develop skills to help you cope with anxiety. You can also gain the perspective you need to overcome your fears. Good sources of support or guidance can be found at your local hospital, mental health clinic, or an employee assistance program.     How to cope with anxiety  Here are some things you can do to cope:    Do what you can.  Keep in mind that you can t control everything. Change what you can. And let the rest take its course.    Exercise. This is a great way to ease tension and help your body feel relaxed.    Stay away from caffeine and nicotine.  These can make anxiety symptoms worse.    Stay sober.  Don't use alcohol or unprescribed medicines. They only make things worse in the long run.    Learn more about anxiety disorders.  Keep track of helpful online resources and books you can use during stressful periods.    Try stress management. Try methods such as meditation.    Talk with others. Think about joining online or in-person support groups.    Get help. Find professional mental health services if your symptoms can't be managed or reduced with the above methods.  [employer] last reviewed this educational content on 4/1/2020 2000-2021 [employer]. All rights reserved. This information is not intended as a substitute for professional medical care. Always follow your healthcare professional's instructions.
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
## 2021-09-01 LAB
ALBUMIN SERPL-MCNC: 4.2 G/DL (ref 3.4–5)
ALP SERPL-CCNC: 66 U/L (ref 40–150)
ALT SERPL W P-5'-P-CCNC: 21 U/L (ref 0–50)
ANION GAP SERPL CALCULATED.3IONS-SCNC: 10 MMOL/L (ref 3–14)
AST SERPL W P-5'-P-CCNC: 20 U/L (ref 0–45)
BILIRUB SERPL-MCNC: 0.4 MG/DL (ref 0.2–1.3)
BUN SERPL-MCNC: 13 MG/DL (ref 7–30)
CALCIUM SERPL-MCNC: 9.2 MG/DL (ref 8.5–10.1)
CHLORIDE BLD-SCNC: 104 MMOL/L (ref 94–109)
CO2 SERPL-SCNC: 23 MMOL/L (ref 20–32)
CREAT SERPL-MCNC: 0.77 MG/DL (ref 0.52–1.04)
DEPRECATED CALCIDIOL+CALCIFEROL SERPL-MC: 45 UG/L (ref 20–75)
GFR SERPL CREATININE-BSD FRML MDRD: >90 ML/MIN/1.73M2
GLUCOSE BLD-MCNC: 75 MG/DL (ref 70–99)
HCV AB SERPL QL IA: NONREACTIVE
POTASSIUM BLD-SCNC: 4.4 MMOL/L (ref 3.4–5.3)
PROT SERPL-MCNC: 8.3 G/DL (ref 6.8–8.8)
SODIUM SERPL-SCNC: 137 MMOL/L (ref 133–144)
TSH SERPL DL<=0.005 MIU/L-ACNC: 1.18 MU/L (ref 0.4–4)

## 2021-09-02 ENCOUNTER — MYC MEDICAL ADVICE (OUTPATIENT)
Dept: FAMILY MEDICINE | Facility: CLINIC | Age: 31
End: 2021-09-02

## 2021-09-02 NOTE — TELEPHONE ENCOUNTER
The Accommodations form was given to Sherif to complete. Please see previous My Chart message.  Alayna Sim,

## 2021-09-03 ENCOUNTER — MYC MEDICAL ADVICE (OUTPATIENT)
Dept: FAMILY MEDICINE | Facility: CLINIC | Age: 31
End: 2021-09-03

## 2021-09-03 NOTE — TELEPHONE ENCOUNTER
Form completed by Sherif and emailed on as requested copy sent to [employer].   Leslie Serna

## 2021-09-25 DIAGNOSIS — R11.0 NAUSEA: ICD-10-CM

## 2021-09-28 NOTE — TELEPHONE ENCOUNTER
Please refill as appropriate.  Thank you,    Susan Alonso RN  [facility]      (F43.23) Adjustment disorder with mixed anxiety and depressed mood  Comment:   Plan: MENTAL HEALTH REFERRAL  - Adult; Outpatient         Treatment; Individual/Couples/Family/Group         Therapy/Health Psychology; Other: [facility]         Network [phone]; We will contact you to         schedule the appointment or please call with         any questions,        sertraline (ZOLOFT) 25 MG tablet            Discussed cares, talked about signs and symptoms of anxiety/ depression and treatment options. Willing to try zoloft  to 25 mg. Pros/ cons of med's discussed . encouraged to see  to help and referral given . spent sometimes counseling patient. Follow up in 3 to 4 weeks, sooner if problem.

## 2021-09-29 RX ORDER — SERTRALINE HYDROCHLORIDE 25 MG/1
TABLET, FILM COATED ORAL
Qty: 30 TABLET | Refills: 1 | Status: SHIPPED | OUTPATIENT
Start: 2021-09-29 | End: 2021-11-11

## 2021-10-03 ENCOUNTER — HEALTH MAINTENANCE LETTER (OUTPATIENT)
Age: 31
End: 2021-10-03

## 2021-11-11 ENCOUNTER — VIRTUAL VISIT (OUTPATIENT)
Dept: FAMILY MEDICINE | Facility: CLINIC | Age: 31
End: 2021-11-11
Payer: COMMERCIAL

## 2021-11-11 DIAGNOSIS — J45.40 MODERATE PERSISTENT ASTHMA WITHOUT COMPLICATION: ICD-10-CM

## 2021-11-11 DIAGNOSIS — F43.23 ADJUSTMENT DISORDER WITH MIXED ANXIETY AND DEPRESSED MOOD: Primary | ICD-10-CM

## 2021-11-11 DIAGNOSIS — R11.0 NAUSEA: ICD-10-CM

## 2021-11-11 PROCEDURE — 99214 OFFICE O/P EST MOD 30 MIN: CPT | Mod: 95 | Performed by: FAMILY MEDICINE

## 2021-11-11 RX ORDER — ALBUTEROL SULFATE 90 UG/1
2 AEROSOL, METERED RESPIRATORY (INHALATION) EVERY 6 HOURS
Qty: 18 G | Refills: 1 | Status: SHIPPED | OUTPATIENT
Start: 2021-11-11 | End: 2023-12-11

## 2021-11-11 RX ORDER — SERTRALINE HYDROCHLORIDE 25 MG/1
25 TABLET, FILM COATED ORAL DAILY
Qty: 90 TABLET | Refills: 1 | Status: SHIPPED | OUTPATIENT
Start: 2021-11-11 | End: 2022-05-19

## 2021-11-11 ASSESSMENT — PATIENT HEALTH QUESTIONNAIRE - PHQ9
SUM OF ALL RESPONSES TO PHQ QUESTIONS 1-9: 0
SUM OF ALL RESPONSES TO PHQ QUESTIONS 1-9: 0
10. IF YOU CHECKED OFF ANY PROBLEMS, HOW DIFFICULT HAVE THESE PROBLEMS MADE IT FOR YOU TO DO YOUR WORK, TAKE CARE OF THINGS AT HOME, OR GET ALONG WITH OTHER PEOPLE: NOT DIFFICULT AT ALL

## 2021-11-11 ASSESSMENT — ANXIETY QUESTIONNAIRES
2. NOT BEING ABLE TO STOP OR CONTROL WORRYING: NOT AT ALL
5. BEING SO RESTLESS THAT IT IS HARD TO SIT STILL: NOT AT ALL
7. FEELING AFRAID AS IF SOMETHING AWFUL MIGHT HAPPEN: NOT AT ALL
GAD7 TOTAL SCORE: 2
4. TROUBLE RELAXING: SEVERAL DAYS
1. FEELING NERVOUS, ANXIOUS, OR ON EDGE: SEVERAL DAYS
GAD7 TOTAL SCORE: 2
GAD7 TOTAL SCORE: 2
8. IF YOU CHECKED OFF ANY PROBLEMS, HOW DIFFICULT HAVE THESE MADE IT FOR YOU TO DO YOUR WORK, TAKE CARE OF THINGS AT HOME, OR GET ALONG WITH OTHER PEOPLE?: SOMEWHAT DIFFICULT
7. FEELING AFRAID AS IF SOMETHING AWFUL MIGHT HAPPEN: NOT AT ALL
3. WORRYING TOO MUCH ABOUT DIFFERENT THINGS: NOT AT ALL
6. BECOMING EASILY ANNOYED OR IRRITABLE: NOT AT ALL

## 2021-11-11 NOTE — PROGRESS NOTES
Cindi is a 30 year old who is being evaluated via a billable video visit.      How would you like to obtain your AVS? MyChart  If the video visit is dropped, the invitation should be resent by: Text to cell phone: [phone]  Will anyone else be joining your video visit? No    Video Start Time: 8:40 AM    Assessment & Plan     (F43.23) Adjustment disorder with mixed anxiety and depressed mood  (primary encounter diagnosis)  Comment: improved and doing well  Plan: sertraline (ZOLOFT) 25 MG tablet            Discussed cares, talked about signs and symptoms of anxiety/ depression and treatment options. Willing to continue same dose of zoloft  to . Pros/ cons of med's discussed .talked about prenatal care with gyn/ zoloft use etc. She is comfortable continuing for now and planning to get pregnant.  encouraged to continue to see  to help. spent sometimes counseling patient. Follow up in  4-6  months, sooner if problem.     (R11.0) Nausea  Comment: anxiety related - improved   Plan: sertraline (ZOLOFT) 25 MG tablet            (J45.40) Moderate persistent asthma without complication  Comment:   Plan: fluticasone-salmeterol (WIXELA INHUB) 250-50         MCG/DOSE inhaler, albuterol (PROAIR         HFA/PROVENTIL HFA/VENTOLIN HFA) 108 (90 Base)         MCG/ACT inhaler        Discussed allergies/asthma, med's / management and symptomatic cares. doinh well and stable      Need to watch the need for albuterol  Inhaler  follow up if needing frequently.  follow up as needed       Check labs. refill sent.Cares and  treatment discussed follow. up if problem   Patient expressed understanding and agreement with treatment plan. All patient's questions were answered, will let me know if has more later.  Medications: Rx's: Reviewed the potential side effects/complications of medications prescribed.     Amy Lawler MD  [facility]    Sally Puente is a 30 year old who presents for the  following health issues     HPI     Depression and Anxiety Follow-Up    How are you doing with your depression since your last visit? Improved still has some anxiety at time but she is juan to control it     How are you doing with your anxiety since your last visit?  Improved . She thins her nausea and GI sx also have improved she is sleeping better now. No problem taking medication. Also seeing therapist once every two weeks.     Are you having other symptoms that might be associated with depression or anxiety? No    Have you had a significant life event? OTHER: trying for baby #2 Sh has take IUD now, her gyn I also aware and was comfortable her continuing this medication     Do you have any concerns with your use of alcohol or other drugs? No    Social History     Tobacco Use     Smoking status: Never Smoker     Smokeless tobacco: Never Used   Substance Use Topics     Alcohol use: Not Currently     Comment: not while pregnant     Drug use: Never     PHQ 11/11/2021   PHQ-9 Total Score 0   Q9: Thoughts of better off dead/self-harm past 2 weeks Not at all     KAYA-7 SCORE 11/11/2021   Total Score 2 (minimal anxiety)   Total Score 2     Last PHQ-9 11/11/2021   1.  Little interest or pleasure in doing things 0   2.  Feeling down, depressed, or hopeless 0   3.  Trouble falling or staying asleep, or sleeping too much 0   4.  Feeling tired or having little energy 0   5.  Poor appetite or overeating 0   6.  Feeling bad about yourself 0   7.  Trouble concentrating 0   8.  Moving slowly or restless 0   Q9: Thoughts of better off dead/self-harm past 2 weeks 0   PHQ-9 Total Score 0     KAYA-7  11/11/2021   1. Feeling nervous, anxious, or on edge 1   2. Not being able to stop or control worrying 0   3. Worrying too much about different things 0   4. Trouble relaxing 1   5. Being so restless that it is hard to sit still 0   6. Becoming easily annoyed or irritable 0   7. Feeling afraid, as if something awful might happen 0   KAYA-7  Total Score 2       Suicide Assessment Five-step Evaluation and Treatment (SAFE-T)    Asthma Follow-Up    Was ACT completed today?    Yes  . doing well, rarely needs albuterol allergies are not bad sometimes OTC Claritin . Need refill on med's   ACT Total Scores 11/11/2021   ACT TOTAL SCORE (Goal Greater than or Equal to 20) 22   In the past 12 months, how many times did you visit the emergency room for your asthma without being admitted to the hospital? 0   In the past 12 months, how many times were you hospitalized overnight because of your asthma? 0          How many days per week do you miss taking your asthma controller medication?  I do not have an asthma controller medication    Please describe any recent triggers for your asthma: None    Have you had any Emergency Room Visits, Urgent Care Visits, or Hospital Admissions since your last office visit?  No        Review of Systems   Constitutional, HEENT, cardiovascular, pulmonary, GI, , musculoskeletal, neuro, skin, endocrine and psych systems are negative, except as otherwise noted.      Objective           Vitals:  No vitals were obtained today due to virtual visit.    Physical Exam   GENERAL: Healthy, alert and no distress  EYES: Eyes grossly normal to inspection.  No discharge or erythema, or obvious scleral/conjunctival abnormalities.  RESP: No audible wheeze, cough, or visible cyanosis.  No visible retractions or increased work of breathing.    SKIN: Visible skin clear. No significant rash, abnormal pigmentation or lesions.  NEURO: Cranial nerves grossly intact.  Mentation and speech appropriate for age.  PSYCH: Mentation appears normal, affect normal/bright, judgement and insight intact, normal speech and appearance well-groomed.                Video-Visit Details    Type of service:  Video Visit    Video End Time:8:54 AM    Originating Location (pt. Location): Home    Distant Location (provider location):  [facility]      Platform used for Video Visit: Brit

## 2021-11-12 ASSESSMENT — PATIENT HEALTH QUESTIONNAIRE - PHQ9: SUM OF ALL RESPONSES TO PHQ QUESTIONS 1-9: 0

## 2021-11-12 ASSESSMENT — ANXIETY QUESTIONNAIRES: GAD7 TOTAL SCORE: 2

## 2021-11-12 ASSESSMENT — ASTHMA QUESTIONNAIRES: ACT_TOTALSCORE: 22

## 2022-02-28 ENCOUNTER — LAB REQUISITION (OUTPATIENT)
Dept: LAB | Facility: CLINIC | Age: 32
End: 2022-02-28

## 2022-02-28 PROCEDURE — 86481 TB AG RESPONSE T-CELL SUSP: CPT | Performed by: INTERNAL MEDICINE

## 2022-03-02 LAB
GAMMA INTERFERON BACKGROUND BLD IA-ACNC: 0.05 IU/ML
M TB IFN-G BLD-IMP: NEGATIVE
M TB IFN-G CD4+ BCKGRND COR BLD-ACNC: 9.95 IU/ML
MITOGEN IGNF BCKGRD COR BLD-ACNC: 0.01 IU/ML
MITOGEN IGNF BCKGRD COR BLD-ACNC: 0.01 IU/ML
QUANTIFERON MITOGEN: 10 IU/ML
QUANTIFERON NIL TUBE: 0.05 IU/ML
QUANTIFERON TB1 TUBE: 0.06 IU/ML
QUANTIFERON TB2 TUBE: 0.06

## 2022-03-22 LAB
ABO (EXTERNAL): NORMAL
HEPATITIS B SURFACE ANTIGEN (EXTERNAL): NEGATIVE
HIV1+2 AB SERPL QL IA: NONREACTIVE
RH (EXTERNAL): POSITIVE
RUBELLA ANTIBODY IGG (EXTERNAL): NORMAL
TREPONEMA PALLIDUM ANTIBODY (EXTERNAL): NONREACTIVE

## 2022-03-29 ENCOUNTER — IMMUNIZATION (OUTPATIENT)
Dept: NURSING | Facility: CLINIC | Age: 32
End: 2022-03-29
Payer: COMMERCIAL

## 2022-03-29 PROCEDURE — 91305 COVID-19,PF,PFIZER (12+ YRS): CPT

## 2022-03-29 PROCEDURE — 0054A COVID-19,PF,PFIZER (12+ YRS): CPT

## 2022-06-06 DIAGNOSIS — R11.0 NAUSEA: ICD-10-CM

## 2022-06-06 DIAGNOSIS — F43.23 ADJUSTMENT DISORDER WITH MIXED ANXIETY AND DEPRESSED MOOD: ICD-10-CM

## 2022-06-09 RX ORDER — SERTRALINE HYDROCHLORIDE 25 MG/1
25 TABLET, FILM COATED ORAL DAILY
Qty: 30 TABLET | Refills: 0 | Status: ON HOLD | OUTPATIENT
Start: 2022-06-09 | End: 2022-09-09

## 2022-06-09 NOTE — TELEPHONE ENCOUNTER
Routing refill request to provider for review/approval because:  PHQ-9 score:    PHQ 11/11/2021   PHQ-9 Total Score 0   Q9: Thoughts of better off dead/self-harm past 2 weeks Not at all     No visit last 6 months      Ben Salter, RN  [facility] Triage Nurse

## 2022-06-09 NOTE — TELEPHONE ENCOUNTER
Called pt to inform that refill of sertraline was sent to pharmacy and that medication check appt is needed. Pt will call back to schedule appt at a later time. Clinic number given.    Tova Michel,  [facility]

## 2022-06-09 NOTE — TELEPHONE ENCOUNTER
Script refill faxed. Remind pt to do follow up for med check  since she is due. Virtual/ Video visit ok

## 2022-07-10 ENCOUNTER — HEALTH MAINTENANCE LETTER (OUTPATIENT)
Age: 32
End: 2022-07-10

## 2022-09-09 ENCOUNTER — HOSPITAL ENCOUNTER (OUTPATIENT)
Facility: CLINIC | Age: 32
LOS: 1 days | Discharge: HOME | End: 2022-09-09
Attending: STUDENT IN AN ORGANIZED HEALTH CARE EDUCATION/TRAINING PROGRAM | Admitting: STUDENT IN AN ORGANIZED HEALTH CARE EDUCATION/TRAINING PROGRAM
Payer: COMMERCIAL

## 2022-09-09 VITALS — SYSTOLIC BLOOD PRESSURE: 123 MMHG | RESPIRATION RATE: 16 BRPM | TEMPERATURE: 98.2 F | DIASTOLIC BLOOD PRESSURE: 84 MMHG

## 2022-09-09 LAB
GROUP B STREPTOCOCCUS (EXTERNAL): NEGATIVE
RUPTURE OF FETAL MEMBRANES BY ROM PLUS: NEGATIVE

## 2022-09-09 PROCEDURE — 59025 FETAL NON-STRESS TEST: CPT

## 2022-09-09 PROCEDURE — G0463 HOSPITAL OUTPT CLINIC VISIT: HCPCS | Mod: 25

## 2022-09-09 PROCEDURE — 84112 EVAL AMNIOTIC FLUID PROTEIN: CPT | Performed by: STUDENT IN AN ORGANIZED HEALTH CARE EDUCATION/TRAINING PROGRAM

## 2022-09-09 RX ORDER — PROCHLORPERAZINE 25 MG
25 SUPPOSITORY, RECTAL RECTAL EVERY 12 HOURS PRN
Status: DISCONTINUED | OUTPATIENT
Start: 2022-09-09 | End: 2022-09-09 | Stop reason: HOSPADM

## 2022-09-09 RX ORDER — METOCLOPRAMIDE HYDROCHLORIDE 5 MG/ML
10 INJECTION INTRAMUSCULAR; INTRAVENOUS EVERY 6 HOURS PRN
Status: DISCONTINUED | OUTPATIENT
Start: 2022-09-09 | End: 2022-09-09 | Stop reason: HOSPADM

## 2022-09-09 RX ORDER — METOCLOPRAMIDE 10 MG/1
10 TABLET ORAL EVERY 6 HOURS PRN
Status: DISCONTINUED | OUTPATIENT
Start: 2022-09-09 | End: 2022-09-09 | Stop reason: HOSPADM

## 2022-09-09 RX ORDER — PROCHLORPERAZINE MALEATE 5 MG
10 TABLET ORAL EVERY 6 HOURS PRN
Status: DISCONTINUED | OUTPATIENT
Start: 2022-09-09 | End: 2022-09-09 | Stop reason: HOSPADM

## 2022-09-09 RX ORDER — CALCIUM CARBONATE 500 MG/1
1 TABLET, CHEWABLE ORAL PRN
COMMUNITY
End: 2022-12-08

## 2022-09-09 RX ORDER — ONDANSETRON 2 MG/ML
4 INJECTION INTRAMUSCULAR; INTRAVENOUS EVERY 6 HOURS PRN
Status: DISCONTINUED | OUTPATIENT
Start: 2022-09-09 | End: 2022-09-09 | Stop reason: HOSPADM

## 2022-09-09 RX ORDER — ONDANSETRON 4 MG/1
4 TABLET, ORALLY DISINTEGRATING ORAL EVERY 6 HOURS PRN
Status: DISCONTINUED | OUTPATIENT
Start: 2022-09-09 | End: 2022-09-09 | Stop reason: HOSPADM

## 2022-09-09 RX ORDER — FAMOTIDINE 10 MG
10 TABLET ORAL DAILY
COMMUNITY
End: 2022-12-08

## 2022-09-09 ASSESSMENT — ACTIVITIES OF DAILY LIVING (ADL): ADLS_ACUITY_SCORE: 18

## 2022-09-10 ENCOUNTER — HEALTH MAINTENANCE LETTER (OUTPATIENT)
Age: 32
End: 2022-09-10

## 2022-09-10 NOTE — PROVIDER NOTIFICATION
"   09/09/22 2017   Provider Notification   Provider Name/Title Dr. GEOVANNA Norris   Method of Notification Electronic Page   Notification Reason Status Update   Provider updated of ROM+ with negative result. RN updates provider that patient is feeling \"intermittent rectal pressure and cervical tenderness\". RN discussed UC tracing with possibility of irritability/contractions but that patient is overall comfortable. Provider desires RN to perform SVE. States if cervix is not dilated can discharge home, if dilated then to continue monitoring and repeat SVE. FHR tracing discussed as documented in flow sheet.  "

## 2022-09-10 NOTE — PLAN OF CARE
Patient discharged at 2100, ambulatory, home with sig other. Labor precautions reviewed. Teach back performed.

## 2022-09-10 NOTE — PROVIDER NOTIFICATION
"   09/09/22 2034   Provider Notification   Provider Name/Title Dr DARYA Salgado   Method of Notification Electronic Page   Notification Reason SVE   Provider updated that after performing SVE and stating patient is 1/40/-4, patient states \"I was 1 from 36 weeks last pregnancy too until my induction\". Per provider patient can discharge home if she is comfortable doing so. Or can stay until 2120 for re check.  "

## 2022-09-10 NOTE — DISCHARGE INSTRUCTIONS
Discharge Instruction for Undelivered Patients      You were seen for: Membrane Assessment  We Consulted: Dr DARYA Salgado  You had (Test or Medicine):ROM+ swab, with negative results and 1 cervical exam    Diet:   Drink 8 to 12 glasses of liquids (milk, juice, water) every day.  You may eat meals and snacks.     Activity:  Count fetal kicks everyday (see handout)  Call your doctor or nurse midwife if your baby is moving less than usual.     Call your provider if you notice:  Swelling in your face or increased swelling in your hands or legs.  Headaches that are not relieved by Tylenol (acetaminophen).  Changes in your vision (blurring: seeing spots or stars.)  Nausea (sick to your stomach) and vomiting (throwing up).   Weight gain of 5 pounds or more per week.  Heartburn that doesn't go away.  Signs of bladder infection: pain when you urinate (use the toilet), need to go more often and more urgently.  The bag of valadez (rupture of membranes) breaks, or you notice leaking in your underwear.  Bright red blood in your underwear.  Abdominal (lower belly) or stomach pain.  Second (plus) baby: Contractions (tightening) less than 10 minutes apart and getting stronger.  Increase or change in vaginal discharge (note the color and amount)      Follow-up:  As scheduled in the clinic on Thursday

## 2022-09-27 ENCOUNTER — LAB (OUTPATIENT)
Dept: LAB | Facility: CLINIC | Age: 32
End: 2022-09-27
Attending: FAMILY MEDICINE
Payer: COMMERCIAL

## 2022-09-27 DIAGNOSIS — Z01.818 PRE-OPERATIVE GENERAL PHYSICAL EXAMINATION: ICD-10-CM

## 2022-09-27 PROCEDURE — U0005 INFEC AGEN DETEC AMPLI PROBE: HCPCS

## 2022-09-27 PROCEDURE — U0003 INFECTIOUS AGENT DETECTION BY NUCLEIC ACID (DNA OR RNA); SEVERE ACUTE RESPIRATORY SYNDROME CORONAVIRUS 2 (SARS-COV-2) (CORONAVIRUS DISEASE [COVID-19]), AMPLIFIED PROBE TECHNIQUE, MAKING USE OF HIGH THROUGHPUT TECHNOLOGIES AS DESCRIBED BY CMS-2020-01-R: HCPCS

## 2022-09-28 ENCOUNTER — ANESTHESIA EVENT (OUTPATIENT)
Dept: OBGYN | Facility: CLINIC | Age: 32
End: 2022-09-28
Payer: COMMERCIAL

## 2022-09-28 LAB — SARS-COV-2 RNA RESP QL NAA+PROBE: NEGATIVE

## 2022-09-29 ENCOUNTER — HOSPITAL ENCOUNTER (INPATIENT)
Facility: CLINIC | Age: 32
LOS: 3 days | Discharge: HOME | End: 2022-10-02
Attending: OBSTETRICS & GYNECOLOGY | Admitting: OBSTETRICS & GYNECOLOGY
Payer: COMMERCIAL

## 2022-09-29 ENCOUNTER — ANESTHESIA (OUTPATIENT)
Dept: OBGYN | Facility: CLINIC | Age: 32
End: 2022-09-29
Payer: COMMERCIAL

## 2022-09-29 LAB
ABO/RH(D): NORMAL
ANTIBODY SCREEN: NEGATIVE
HGB BLD-MCNC: 13.1 G/DL (ref 11.7–15.7)
SPECIMEN EXPIRATION DATE: NORMAL
T PALLIDUM AB SER QL: NONREACTIVE

## 2022-09-29 PROCEDURE — 999N000128 HC STATISTIC PERIPHERAL IV START W/O US GUIDANCE

## 2022-09-29 PROCEDURE — 250N000011 HC RX IP 250 OP 636: Performed by: ANESTHESIOLOGY

## 2022-09-29 PROCEDURE — 00HU33Z INSERTION OF INFUSION DEVICE INTO SPINAL CANAL, PERCUTANEOUS APPROACH: ICD-10-PCS | Performed by: ANESTHESIOLOGY

## 2022-09-29 PROCEDURE — 258N000003 HC RX IP 258 OP 636: Performed by: ANESTHESIOLOGY

## 2022-09-29 PROCEDURE — 36415 COLL VENOUS BLD VENIPUNCTURE: CPT | Performed by: PHYSICIAN ASSISTANT

## 2022-09-29 PROCEDURE — 86780 TREPONEMA PALLIDUM: CPT | Performed by: PHYSICIAN ASSISTANT

## 2022-09-29 PROCEDURE — 250N000013 HC RX MED GY IP 250 OP 250 PS 637

## 2022-09-29 PROCEDURE — 258N000003 HC RX IP 258 OP 636: Performed by: NURSE ANESTHETIST, CERTIFIED REGISTERED

## 2022-09-29 PROCEDURE — 360N000076 HC SURGERY LEVEL 3, PER MIN: Performed by: OBSTETRICS & GYNECOLOGY

## 2022-09-29 PROCEDURE — 250N000011 HC RX IP 250 OP 636: Performed by: NURSE ANESTHETIST, CERTIFIED REGISTERED

## 2022-09-29 PROCEDURE — 250N000009 HC RX 250: Performed by: NURSE ANESTHETIST, CERTIFIED REGISTERED

## 2022-09-29 PROCEDURE — 250N000011 HC RX IP 250 OP 636: Performed by: OBSTETRICS & GYNECOLOGY

## 2022-09-29 PROCEDURE — 710N000009 HC RECOVERY PHASE 1, LEVEL 1, PER MIN: Performed by: OBSTETRICS & GYNECOLOGY

## 2022-09-29 PROCEDURE — 370N000017 HC ANESTHESIA TECHNICAL FEE, PER MIN: Performed by: OBSTETRICS & GYNECOLOGY

## 2022-09-29 PROCEDURE — 250N000013 HC RX MED GY IP 250 OP 250 PS 637: Performed by: OBSTETRICS & GYNECOLOGY

## 2022-09-29 PROCEDURE — 3E0R3BZ INTRODUCTION OF ANESTHETIC AGENT INTO SPINAL CANAL, PERCUTANEOUS APPROACH: ICD-10-PCS | Performed by: ANESTHESIOLOGY

## 2022-09-29 PROCEDURE — 250N000009 HC RX 250: Performed by: PHYSICIAN ASSISTANT

## 2022-09-29 PROCEDURE — 86901 BLOOD TYPING SEROLOGIC RH(D): CPT | Performed by: PHYSICIAN ASSISTANT

## 2022-09-29 PROCEDURE — 85018 HEMOGLOBIN: CPT | Performed by: PHYSICIAN ASSISTANT

## 2022-09-29 PROCEDURE — 120N000012 HC R&B POSTPARTUM

## 2022-09-29 PROCEDURE — 250N000011 HC RX IP 250 OP 636: Performed by: PHYSICIAN ASSISTANT

## 2022-09-29 PROCEDURE — 272N000001 HC OR GENERAL SUPPLY STERILE: Performed by: OBSTETRICS & GYNECOLOGY

## 2022-09-29 RX ORDER — CEFAZOLIN SODIUM/WATER 2 G/20 ML
2 SYRINGE (ML) INTRAVENOUS SEE ADMIN INSTRUCTIONS
Status: DISCONTINUED | OUTPATIENT
Start: 2022-09-29 | End: 2022-09-29 | Stop reason: HOSPADM

## 2022-09-29 RX ORDER — ONDANSETRON 2 MG/ML
INJECTION INTRAMUSCULAR; INTRAVENOUS PRN
Status: DISCONTINUED | OUTPATIENT
Start: 2022-09-29 | End: 2022-09-29

## 2022-09-29 RX ORDER — OXYTOCIN/0.9 % SODIUM CHLORIDE 30/500 ML
340 PLASTIC BAG, INJECTION (ML) INTRAVENOUS CONTINUOUS PRN
Status: DISCONTINUED | OUTPATIENT
Start: 2022-09-29 | End: 2022-10-02 | Stop reason: HOSPADM

## 2022-09-29 RX ORDER — PROCHLORPERAZINE MALEATE 10 MG
10 TABLET ORAL EVERY 6 HOURS PRN
Status: DISCONTINUED | OUTPATIENT
Start: 2022-09-29 | End: 2022-10-02 | Stop reason: HOSPADM

## 2022-09-29 RX ORDER — BUPIVACAINE HYDROCHLORIDE 7.5 MG/ML
INJECTION, SOLUTION INTRASPINAL
Status: COMPLETED | OUTPATIENT
Start: 2022-09-29 | End: 2022-09-29

## 2022-09-29 RX ORDER — METOCLOPRAMIDE HYDROCHLORIDE 5 MG/ML
10 INJECTION INTRAMUSCULAR; INTRAVENOUS EVERY 6 HOURS PRN
Status: DISCONTINUED | OUTPATIENT
Start: 2022-09-29 | End: 2022-10-02 | Stop reason: HOSPADM

## 2022-09-29 RX ORDER — MISOPROSTOL 200 UG/1
400 TABLET ORAL
Status: DISCONTINUED | OUTPATIENT
Start: 2022-09-29 | End: 2022-09-29 | Stop reason: HOSPADM

## 2022-09-29 RX ORDER — IBUPROFEN 400 MG/1
800 TABLET, FILM COATED ORAL EVERY 6 HOURS
Status: DISCONTINUED | OUTPATIENT
Start: 2022-09-30 | End: 2022-10-02 | Stop reason: HOSPADM

## 2022-09-29 RX ORDER — TRANEXAMIC ACID 10 MG/ML
1 INJECTION, SOLUTION INTRAVENOUS EVERY 30 MIN PRN
Status: DISCONTINUED | OUTPATIENT
Start: 2022-09-29 | End: 2022-10-02 | Stop reason: HOSPADM

## 2022-09-29 RX ORDER — CITRIC ACID/SODIUM CITRATE 334-500MG
SOLUTION, ORAL ORAL
Status: COMPLETED
Start: 2022-09-29 | End: 2022-09-29

## 2022-09-29 RX ORDER — OXYCODONE HYDROCHLORIDE 5 MG/1
5 TABLET ORAL EVERY 4 HOURS PRN
Status: DISCONTINUED | OUTPATIENT
Start: 2022-09-29 | End: 2022-10-02 | Stop reason: HOSPADM

## 2022-09-29 RX ORDER — NALOXONE HYDROCHLORIDE 0.4 MG/ML
0.4 INJECTION, SOLUTION INTRAMUSCULAR; INTRAVENOUS; SUBCUTANEOUS
Status: DISCONTINUED | OUTPATIENT
Start: 2022-09-29 | End: 2022-10-02 | Stop reason: HOSPADM

## 2022-09-29 RX ORDER — SIMETHICONE 80 MG
80 TABLET,CHEWABLE ORAL 4 TIMES DAILY PRN
Status: DISCONTINUED | OUTPATIENT
Start: 2022-09-29 | End: 2022-10-02 | Stop reason: HOSPADM

## 2022-09-29 RX ORDER — EPHEDRINE SULFATE 50 MG/ML
INJECTION, SOLUTION INTRAMUSCULAR; INTRAVENOUS; SUBCUTANEOUS PRN
Status: DISCONTINUED | OUTPATIENT
Start: 2022-09-29 | End: 2022-09-29

## 2022-09-29 RX ORDER — NALOXONE HYDROCHLORIDE 0.4 MG/ML
0.2 INJECTION, SOLUTION INTRAMUSCULAR; INTRAVENOUS; SUBCUTANEOUS
Status: DISCONTINUED | OUTPATIENT
Start: 2022-09-29 | End: 2022-10-02 | Stop reason: HOSPADM

## 2022-09-29 RX ORDER — DEXTROSE, SODIUM CHLORIDE, SODIUM LACTATE, POTASSIUM CHLORIDE, AND CALCIUM CHLORIDE 5; .6; .31; .03; .02 G/100ML; G/100ML; G/100ML; G/100ML; G/100ML
INJECTION, SOLUTION INTRAVENOUS CONTINUOUS
Status: DISCONTINUED | OUTPATIENT
Start: 2022-09-29 | End: 2022-10-02 | Stop reason: HOSPADM

## 2022-09-29 RX ORDER — CARBOPROST TROMETHAMINE 250 UG/ML
250 INJECTION, SOLUTION INTRAMUSCULAR
Status: DISCONTINUED | OUTPATIENT
Start: 2022-09-29 | End: 2022-09-29 | Stop reason: HOSPADM

## 2022-09-29 RX ORDER — HYDROCORTISONE 25 MG/G
CREAM TOPICAL 3 TIMES DAILY PRN
Status: DISCONTINUED | OUTPATIENT
Start: 2022-09-29 | End: 2022-10-02 | Stop reason: HOSPADM

## 2022-09-29 RX ORDER — OXYTOCIN 10 [USP'U]/ML
10 INJECTION, SOLUTION INTRAMUSCULAR; INTRAVENOUS
Status: DISCONTINUED | OUTPATIENT
Start: 2022-09-29 | End: 2022-09-29

## 2022-09-29 RX ORDER — OXYTOCIN 10 [USP'U]/ML
10 INJECTION, SOLUTION INTRAMUSCULAR; INTRAVENOUS
Status: DISCONTINUED | OUTPATIENT
Start: 2022-09-29 | End: 2022-09-29 | Stop reason: HOSPADM

## 2022-09-29 RX ORDER — METHYLERGONOVINE MALEATE 0.2 MG/ML
200 INJECTION INTRAVENOUS
Status: DISCONTINUED | OUTPATIENT
Start: 2022-09-29 | End: 2022-09-29 | Stop reason: HOSPADM

## 2022-09-29 RX ORDER — SODIUM CHLORIDE, SODIUM LACTATE, POTASSIUM CHLORIDE, CALCIUM CHLORIDE 600; 310; 30; 20 MG/100ML; MG/100ML; MG/100ML; MG/100ML
INJECTION, SOLUTION INTRAVENOUS CONTINUOUS PRN
Status: DISCONTINUED | OUTPATIENT
Start: 2022-09-29 | End: 2022-09-29

## 2022-09-29 RX ORDER — MISOPROSTOL 200 UG/1
400 TABLET ORAL
Status: DISCONTINUED | OUTPATIENT
Start: 2022-09-29 | End: 2022-10-02 | Stop reason: HOSPADM

## 2022-09-29 RX ORDER — BISACODYL 10 MG
10 SUPPOSITORY, RECTAL RECTAL DAILY PRN
Status: DISCONTINUED | OUTPATIENT
Start: 2022-10-01 | End: 2022-10-02 | Stop reason: HOSPADM

## 2022-09-29 RX ORDER — ONDANSETRON 2 MG/ML
4 INJECTION INTRAMUSCULAR; INTRAVENOUS EVERY 6 HOURS PRN
Status: DISCONTINUED | OUTPATIENT
Start: 2022-09-29 | End: 2022-10-02 | Stop reason: HOSPADM

## 2022-09-29 RX ORDER — TRANEXAMIC ACID 10 MG/ML
1 INJECTION, SOLUTION INTRAVENOUS EVERY 30 MIN PRN
Status: DISCONTINUED | OUTPATIENT
Start: 2022-09-29 | End: 2022-09-29 | Stop reason: HOSPADM

## 2022-09-29 RX ORDER — ACETAMINOPHEN 325 MG/1
TABLET ORAL
Status: COMPLETED
Start: 2022-09-29 | End: 2022-09-29

## 2022-09-29 RX ORDER — CARBOPROST TROMETHAMINE 250 UG/ML
250 INJECTION, SOLUTION INTRAMUSCULAR
Status: DISCONTINUED | OUTPATIENT
Start: 2022-09-29 | End: 2022-10-02 | Stop reason: HOSPADM

## 2022-09-29 RX ORDER — METOCLOPRAMIDE 10 MG/1
10 TABLET ORAL EVERY 6 HOURS PRN
Status: DISCONTINUED | OUTPATIENT
Start: 2022-09-29 | End: 2022-10-02 | Stop reason: HOSPADM

## 2022-09-29 RX ORDER — ALBUTEROL SULFATE 90 UG/1
2 AEROSOL, METERED RESPIRATORY (INHALATION) EVERY 6 HOURS
Status: DISCONTINUED | OUTPATIENT
Start: 2022-09-29 | End: 2022-10-02 | Stop reason: HOSPADM

## 2022-09-29 RX ORDER — ACETAMINOPHEN 325 MG/1
975 TABLET ORAL ONCE
Status: COMPLETED | OUTPATIENT
Start: 2022-09-29 | End: 2022-09-29

## 2022-09-29 RX ORDER — NALBUPHINE HYDROCHLORIDE 10 MG/ML
2.5-5 INJECTION, SOLUTION INTRAMUSCULAR; INTRAVENOUS; SUBCUTANEOUS EVERY 6 HOURS PRN
Status: DISCONTINUED | OUTPATIENT
Start: 2022-09-29 | End: 2022-09-29

## 2022-09-29 RX ORDER — METHYLERGONOVINE MALEATE 0.2 MG/ML
200 INJECTION INTRAVENOUS
Status: DISCONTINUED | OUTPATIENT
Start: 2022-09-29 | End: 2022-10-02 | Stop reason: HOSPADM

## 2022-09-29 RX ORDER — CEFAZOLIN SODIUM/WATER 2 G/20 ML
2 SYRINGE (ML) INTRAVENOUS
Status: COMPLETED | OUTPATIENT
Start: 2022-09-29 | End: 2022-09-29

## 2022-09-29 RX ORDER — SODIUM CHLORIDE, SODIUM LACTATE, POTASSIUM CHLORIDE, CALCIUM CHLORIDE 600; 310; 30; 20 MG/100ML; MG/100ML; MG/100ML; MG/100ML
INJECTION, SOLUTION INTRAVENOUS CONTINUOUS
Status: DISCONTINUED | OUTPATIENT
Start: 2022-09-29 | End: 2022-09-29 | Stop reason: HOSPADM

## 2022-09-29 RX ORDER — ACETAMINOPHEN 325 MG/1
975 TABLET ORAL EVERY 6 HOURS
Status: DISCONTINUED | OUTPATIENT
Start: 2022-09-29 | End: 2022-10-02 | Stop reason: HOSPADM

## 2022-09-29 RX ORDER — FENTANYL CITRATE-0.9 % NACL/PF 10 MCG/ML
100 PLASTIC BAG, INJECTION (ML) INTRAVENOUS EVERY 5 MIN PRN
Status: DISCONTINUED | OUTPATIENT
Start: 2022-09-29 | End: 2022-09-29 | Stop reason: HOSPADM

## 2022-09-29 RX ORDER — MISOPROSTOL 200 UG/1
800 TABLET ORAL
Status: DISCONTINUED | OUTPATIENT
Start: 2022-09-29 | End: 2022-10-02 | Stop reason: HOSPADM

## 2022-09-29 RX ORDER — OXYTOCIN/0.9 % SODIUM CHLORIDE 30/500 ML
100-340 PLASTIC BAG, INJECTION (ML) INTRAVENOUS CONTINUOUS PRN
Status: DISCONTINUED | OUTPATIENT
Start: 2022-09-29 | End: 2022-09-29

## 2022-09-29 RX ORDER — KETOROLAC TROMETHAMINE 30 MG/ML
30 INJECTION, SOLUTION INTRAMUSCULAR; INTRAVENOUS EVERY 6 HOURS
Status: COMPLETED | OUTPATIENT
Start: 2022-09-29 | End: 2022-09-30

## 2022-09-29 RX ORDER — OXYTOCIN/0.9 % SODIUM CHLORIDE 30/500 ML
340 PLASTIC BAG, INJECTION (ML) INTRAVENOUS CONTINUOUS PRN
Status: DISCONTINUED | OUTPATIENT
Start: 2022-09-29 | End: 2022-09-29 | Stop reason: HOSPADM

## 2022-09-29 RX ORDER — KETOROLAC TROMETHAMINE 30 MG/ML
INJECTION, SOLUTION INTRAMUSCULAR; INTRAVENOUS PRN
Status: DISCONTINUED | OUTPATIENT
Start: 2022-09-29 | End: 2022-09-29

## 2022-09-29 RX ORDER — CEFAZOLIN SODIUM/WATER 2 G/20 ML
SYRINGE (ML) INTRAVENOUS
Status: DISCONTINUED
Start: 2022-09-29 | End: 2022-09-29 | Stop reason: HOSPADM

## 2022-09-29 RX ORDER — LIDOCAINE 40 MG/G
CREAM TOPICAL
Status: DISCONTINUED | OUTPATIENT
Start: 2022-09-29 | End: 2022-10-02 | Stop reason: HOSPADM

## 2022-09-29 RX ORDER — ONDANSETRON 4 MG/1
4 TABLET, ORALLY DISINTEGRATING ORAL EVERY 6 HOURS PRN
Status: DISCONTINUED | OUTPATIENT
Start: 2022-09-29 | End: 2022-10-02 | Stop reason: HOSPADM

## 2022-09-29 RX ORDER — AMOXICILLIN 250 MG
2 CAPSULE ORAL 2 TIMES DAILY
Status: DISCONTINUED | OUTPATIENT
Start: 2022-09-29 | End: 2022-10-02 | Stop reason: HOSPADM

## 2022-09-29 RX ORDER — MISOPROSTOL 200 UG/1
800 TABLET ORAL
Status: DISCONTINUED | OUTPATIENT
Start: 2022-09-29 | End: 2022-09-29 | Stop reason: HOSPADM

## 2022-09-29 RX ORDER — MORPHINE SULFATE 1 MG/ML
INJECTION, SOLUTION EPIDURAL; INTRATHECAL; INTRAVENOUS
Status: COMPLETED | OUTPATIENT
Start: 2022-09-29 | End: 2022-09-29

## 2022-09-29 RX ORDER — MODIFIED LANOLIN
OINTMENT (GRAM) TOPICAL
Status: DISCONTINUED | OUTPATIENT
Start: 2022-09-29 | End: 2022-10-02 | Stop reason: HOSPADM

## 2022-09-29 RX ORDER — CITRIC ACID/SODIUM CITRATE 334-500MG
30 SOLUTION, ORAL ORAL
Status: COMPLETED | OUTPATIENT
Start: 2022-09-29 | End: 2022-09-29

## 2022-09-29 RX ORDER — LIDOCAINE 40 MG/G
CREAM TOPICAL
Status: DISCONTINUED | OUTPATIENT
Start: 2022-09-29 | End: 2022-09-29 | Stop reason: HOSPADM

## 2022-09-29 RX ORDER — OXYTOCIN 10 [USP'U]/ML
10 INJECTION, SOLUTION INTRAMUSCULAR; INTRAVENOUS
Status: DISCONTINUED | OUTPATIENT
Start: 2022-09-29 | End: 2022-10-02 | Stop reason: HOSPADM

## 2022-09-29 RX ORDER — PROCHLORPERAZINE 25 MG
25 SUPPOSITORY, RECTAL RECTAL EVERY 12 HOURS PRN
Status: DISCONTINUED | OUTPATIENT
Start: 2022-09-29 | End: 2022-10-02 | Stop reason: HOSPADM

## 2022-09-29 RX ORDER — AMOXICILLIN 250 MG
1 CAPSULE ORAL 2 TIMES DAILY
Status: DISCONTINUED | OUTPATIENT
Start: 2022-09-29 | End: 2022-10-02 | Stop reason: HOSPADM

## 2022-09-29 RX ADMIN — BUPIVACAINE HYDROCHLORIDE IN DEXTROSE 1.7 ML: 7.5 INJECTION, SOLUTION SUBARACHNOID at 09:19

## 2022-09-29 RX ADMIN — MORPHINE SULFATE 0.15 MG: 1 INJECTION, SOLUTION EPIDURAL; INTRATHECAL; INTRAVENOUS at 09:19

## 2022-09-29 RX ADMIN — ACETAMINOPHEN 975 MG: 325 TABLET, FILM COATED ORAL at 14:11

## 2022-09-29 RX ADMIN — SODIUM CHLORIDE, SODIUM LACTATE, POTASSIUM CHLORIDE, CALCIUM CHLORIDE AND DEXTROSE MONOHYDRATE: 5; 600; 310; 30; 20 INJECTION, SOLUTION INTRAVENOUS at 14:11

## 2022-09-29 RX ADMIN — OXYCODONE HYDROCHLORIDE 5 MG: 5 TABLET ORAL at 12:34

## 2022-09-29 RX ADMIN — SODIUM CITRATE AND CITRIC ACID MONOHYDRATE 30 ML: 500; 334 SOLUTION ORAL at 08:40

## 2022-09-29 RX ADMIN — SODIUM CHLORIDE, POTASSIUM CHLORIDE, SODIUM LACTATE AND CALCIUM CHLORIDE 250 ML: 600; 310; 30; 20 INJECTION, SOLUTION INTRAVENOUS at 08:21

## 2022-09-29 RX ADMIN — Medication 2 G: at 09:13

## 2022-09-29 RX ADMIN — Medication 10 MG: at 09:24

## 2022-09-29 RX ADMIN — PHENYLEPHRINE HYDROCHLORIDE 100 MCG: 10 INJECTION INTRAVENOUS at 09:55

## 2022-09-29 RX ADMIN — KETOROLAC TROMETHAMINE 30 MG: 30 INJECTION, SOLUTION INTRAMUSCULAR; INTRAVENOUS at 23:18

## 2022-09-29 RX ADMIN — ACETAMINOPHEN 975 MG: 325 TABLET, FILM COATED ORAL at 08:29

## 2022-09-29 RX ADMIN — Medication 340 ML/HR: at 09:46

## 2022-09-29 RX ADMIN — ACETAMINOPHEN 975 MG: 325 TABLET, FILM COATED ORAL at 21:32

## 2022-09-29 RX ADMIN — Medication 30 ML: at 08:40

## 2022-09-29 RX ADMIN — PHENYLEPHRINE HYDROCHLORIDE 100 MCG: 10 INJECTION INTRAVENOUS at 09:24

## 2022-09-29 RX ADMIN — SENNOSIDES AND DOCUSATE SODIUM 1 TABLET: 50; 8.6 TABLET ORAL at 21:32

## 2022-09-29 RX ADMIN — ONDANSETRON 4 MG: 2 INJECTION INTRAMUSCULAR; INTRAVENOUS at 09:46

## 2022-09-29 RX ADMIN — KETOROLAC TROMETHAMINE 30 MG: 30 INJECTION, SOLUTION INTRAMUSCULAR at 10:04

## 2022-09-29 RX ADMIN — KETOROLAC TROMETHAMINE 30 MG: 30 INJECTION, SOLUTION INTRAMUSCULAR; INTRAVENOUS at 16:01

## 2022-09-29 RX ADMIN — PHENYLEPHRINE HYDROCHLORIDE 150 MCG: 10 INJECTION INTRAVENOUS at 09:39

## 2022-09-29 RX ADMIN — PHENYLEPHRINE HYDROCHLORIDE 100 MCG: 10 INJECTION INTRAVENOUS at 09:22

## 2022-09-29 RX ADMIN — SODIUM CHLORIDE, POTASSIUM CHLORIDE, SODIUM LACTATE AND CALCIUM CHLORIDE: 600; 310; 30; 20 INJECTION, SOLUTION INTRAVENOUS at 09:09

## 2022-09-29 RX ADMIN — SODIUM CHLORIDE, POTASSIUM CHLORIDE, SODIUM LACTATE AND CALCIUM CHLORIDE: 600; 310; 30; 20 INJECTION, SOLUTION INTRAVENOUS at 09:45

## 2022-09-29 RX ADMIN — PHENYLEPHRINE HYDROCHLORIDE 0.4 MCG/KG/MIN: 10 INJECTION INTRAVENOUS at 09:16

## 2022-09-29 RX ADMIN — ACETAMINOPHEN 975 MG: 325 TABLET ORAL at 08:29

## 2022-09-29 ASSESSMENT — ACTIVITIES OF DAILY LIVING (ADL)
ADLS_ACUITY_SCORE: 31
ADLS_ACUITY_SCORE: 18
ADLS_ACUITY_SCORE: 26
TOILETING_ISSUES: NO
ADLS_ACUITY_SCORE: 18
ADLS_ACUITY_SCORE: 31
ADLS_ACUITY_SCORE: 26
CONCENTRATING,_REMEMBERING_OR_MAKING_DECISIONS_DIFFICULTY: NO
DRESSING/BATHING_DIFFICULTY: NO
DIFFICULTY_EATING/SWALLOWING: NO
WEAR_GLASSES_OR_BLIND: NO
ADLS_ACUITY_SCORE: 26
DOING_ERRANDS_INDEPENDENTLY_DIFFICULTY: NO
ADLS_ACUITY_SCORE: 31
WALKING_OR_CLIMBING_STAIRS_DIFFICULTY: NO
ADLS_ACUITY_SCORE: 35
FALL_HISTORY_WITHIN_LAST_SIX_MONTHS: NO
CHANGE_IN_FUNCTIONAL_STATUS_SINCE_ONSET_OF_CURRENT_ILLNESS/INJURY: NO

## 2022-09-29 NOTE — ANESTHESIA PREPROCEDURE EVALUATION
Anesthesia Pre-Procedure Evaluation    Patient: Cindi Padilla   MRN: [id number] : 1990        Procedure : Procedure(s):  REPEAT  SECTION AND DRAINAGE OF BARTHOLIN'S CYST          Past Medical History:   Diagnosis Date     Asthma     Lifelong, currently stable.  No inhaler use for 3months, and infrequent use during preg (19)     NO ACTIVE PROBLEMS       Past Surgical History:   Procedure Laterality Date      SECTION N/A 2019    Procedure:  SECTION;  Surgeon: Herminio Phoenix MD;  Location: [facility] L[facility]     LASIK BILATERAL Bilateral 2016     wisdom teeth       ZZC REPAIR CRUCIATE LIGAMENT,KNEE Right     No current issues      No Known Allergies   Social History     Tobacco Use     Smoking status: Never Smoker     Smokeless tobacco: Never Used   Substance Use Topics     Alcohol use: Not Currently     Comment: not while pregnant      Wt Readings from Last 1 Encounters:   22 83.5 kg (184 lb)        Anesthesia Evaluation   Pt has had prior anesthetic.     No history of anesthetic complications       ROS/MED HX  ENT/Pulmonary:     (+) asthma (daily inhaler, very rare rescue)     Neurologic:       Cardiovascular:       METS/Exercise Tolerance:     Hematologic:       Musculoskeletal:       GI/Hepatic:    (-) GERD   Renal/Genitourinary:       Endo:       Psychiatric/Substance Use:       Infectious Disease:       Malignancy:       Other:            Physical Exam    Airway        Mallampati: II   TM distance: > 3 FB   Neck ROM: full   Mouth opening: > 3 cm    Respiratory Devices and Support         Dental  no notable dental history         Cardiovascular   cardiovascular exam normal          Pulmonary   pulmonary exam normal                OUTSIDE LABS:  CBC:   Lab Results   Component Value Date    WBC 7.8 2021    WBC 7.8 2021    HGB 15.6 2021    HGB 13.0 2021    HCT 44.6 2021    HCT 38.5 2021     2021     2021      BMP:   Lab Results   Component Value Date     08/31/2021     01/01/2021    POTASSIUM 4.4 08/31/2021    POTASSIUM 4.8 01/01/2021    CHLORIDE 104 08/31/2021    CHLORIDE 105 01/01/2021    CO2 23 08/31/2021    CO2 29 01/01/2021    BUN 13 08/31/2021    BUN 12 01/01/2021    CR 0.77 08/31/2021    CR 0.71 01/01/2021    GLC 75 08/31/2021    GLC 97 01/01/2021     COAGS: No results found for: PTT, INR, FIBR  POC: No results found for: BGM, HCG, HCGS  HEPATIC:   Lab Results   Component Value Date    ALBUMIN 4.2 08/31/2021    PROTTOTAL 8.3 08/31/2021    ALT 21 08/31/2021    AST 20 08/31/2021    ALKPHOS 66 08/31/2021    BILITOTAL 0.4 08/31/2021     OTHER:   Lab Results   Component Value Date    CHRIS 9.2 08/31/2021    TSH 1.18 08/31/2021       Anesthesia Plan    ASA Status:  2   NPO Status:  NPO Appropriate    Anesthesia Type: Spinal.              Consents    Anesthesia Plan(s) and associated risks, benefits, and realistic alternatives discussed. Questions answered and patient/representative(s) expressed understanding.    - Discussed:     - Discussed with:  Patient         Postoperative Care    Pain management: IV analgesics, Multi-modal analgesia.   PONV prophylaxis: Ondansetron (or other 5HT-3)     Comments:    Other Comments: EMR reviewed    Will be her 2nd Csection, 1st was unplanned and an epidural was used.  No problems during this pregnancy    Morphine to help with postop pain            Dequan Riddle MD

## 2022-09-29 NOTE — ANESTHESIA CARE TRANSFER NOTE
Patient: Cindi Padilla    Procedure: Procedure(s):  REPEAT  SECTION       Diagnosis: Previous  section [Z98.891]  Diagnosis Additional Information: No value filed.    Anesthesia Type:   Spinal     Note:    Oropharynx: oropharynx clear of all foreign objects and spontaneously breathing  Level of Consciousness: awake  Oxygen Supplementation: room air    Independent Airway: airway patency satisfactory and stable  Dentition: dentition unchanged  Vital Signs Stable: post-procedure vital signs reviewed and stable  Report to RN Given: handoff report given  Patient transferred to: PACU  Comments: Transferred to OB PACU recovery, spontaneous respirations on room air with oxygen saturations maintained greater than 98%. SpO2, NiBP, and EKG monitors and alarms on and functioning, report on patient's clinical status given to OB recovery RN, RN questions answered, patient in hospital cart with siderails up Oxytocin 30 units in 500mL infusion connected to IV infusion pump in recovery bay and programmed to 100 mL/hr at handoff of care.    Handoff Report: Identifed the Patient, Identified the Reponsible Provider, Reviewed the pertinent medical history, Discussed the surgical course, Reviewed Intra-OP anesthesia mangement and issues during anesthesia, Set expectations for post-procedure period and Allowed opportunity for questions and acknowledgement of understanding      Vitals:  Vitals Value Taken Time   BP     Temp     Pulse     Resp     SpO2         Electronically Signed By: FLAVIO Meneses CRNA  2022  10:24 AM

## 2022-09-29 NOTE — ANESTHESIA PROCEDURE NOTES
Intrathecal Procedure Note    Pre-Procedure   Staff -        Anesthesiologist:  Dequan Riddle MD       Performed By: anesthesiologist       Location: OR       Pre-Anesthestic Checklist: patient identified, IV checked, risks and benefits discussed, informed consent, monitors and equipment checked and pre-op evaluation  Timeout:       Correct Patient: Yes        Correct Procedure: Yes        Correct Position: Yes   Procedure Documentation  Procedure: intrathecal       Patient Position: sitting       Patient Prep/Sterile Barriers: sterile gloves, mask, patient draped, 3 rounds of betadine.  Waited for it to completely dry prior to procedure       Skin prep: Betadine       Insertion Site: L3-4. (midline approach).       Spinal Needle Type: Nataly-Joselito       Introducer used       # of attempts: 1 and  # of redirects:     Assessment/Narrative         Paresthesias: No.       CSF fluid: clear.    Medication(s) Administered   0.75% Hyperbaric Bupivacaine (Intrathecal) - Intrathecal   1.7 mL - 9/29/2022 9:19:00 AM  Morphine PF 1 mg/mL (Intrathecal) - Intrathecal   0.15 mg - 9/29/2022 9:19:00 AM   Comments:  1% lidocaine to skin  No complications

## 2022-09-29 NOTE — PLAN OF CARE
Pt transferred to room 433 with baby in arms.  All belongings sent with pt. Bedside report to YANDY Lazaro.  Bands verified. Care taken over.

## 2022-09-29 NOTE — PLAN OF CARE
39+3 week pt of Dr. Phoenix ambulates to MAC from home for scheduled c/s.  EUM/US explained and applied with pt permission. Admission data base obtained. Prepped for C/S. See flow sheets and delivery summary.  0155 dr. Fuller at bedside to preop pt. Continued support given.

## 2022-09-29 NOTE — PLAN OF CARE
AOx4. VSS. Independent. Breastfeeding well. Jarrett removed @ 1800 - DTV by 2200. Pain controlled with scheduled tylenol and toradol, PRN oxycodone. FF/U1, scant to light flow. Passing small clots occasionally - asymptomatic. Regular diet. L PIV SL. Discharge pending.

## 2022-09-29 NOTE — PLAN OF CARE
Pt ambulates to OB OR#2. Placed in lithotomy position. Dr. Fuller exams bartholin cyst. Discussed with pt that  section only is all that is recommended at this time.  See provider notes. Pt verbalizes consent for repeat  section delivery only.  Proceed with surgery.

## 2022-09-29 NOTE — ANESTHESIA POSTPROCEDURE EVALUATION
Patient: Cindi Padilla    Procedure: Procedure(s):  REPEAT  SECTION       Anesthesia Type:  Spinal    Note:  Disposition: Inpatient   Postop Pain Control: Uneventful            Sign Out: Well controlled pain   PONV: No   Neuro/Psych: Uneventful            Sign Out: Acceptable/Baseline neuro status   Airway/Respiratory: Uneventful            Sign Out: Acceptable/Baseline resp. status   CV/Hemodynamics: Uneventful            Sign Out: Acceptable CV status; No obvious hypovolemia; No obvious fluid overload   Other NRE: NONE   DID A NON-ROUTINE EVENT OCCUR? No           Last vitals:  Vitals Value Taken Time   BP 97/58 22 1200   Temp 36.6  C (97.8  F) 22 1200   Pulse     Resp 16 22 1200   SpO2 95 % 22 1200       Electronically Signed By: Dequan Riddle MD  2022  1:57 PM

## 2022-09-29 NOTE — H&P
[facility] Labor and Delivery History and Physical    Cindi Conde MRN# [id number]   Age: 31 year old YOB: 1990     Date of Admission:  2022    Primary care provider: Tiesha, [place] [facility]           Chief Complaint:   Cindi Conde is a 31 year old female who is 39w3d pregnant and being admitted for .          Pregnancy history:     OBSTETRIC HISTORY:    OB History    Para Term  AB Living   2 1 1 0 0 1   SAB IAB Ectopic Multiple Live Births   0 0 0 0 1      # Outcome Date GA Lbr Aime/2nd Weight Sex Delivery Anes PTL Lv   2 Current            1 Term 19 41w5d 02:20 / 03:33 3.81 kg (8 lb 6.4 oz) M CS-LTranv EPI N AMELIA      Complications: Fetal Intolerance, Chorioamnionitis, Failure to Progress in Second Stage, Cephalopelvic Disproportion      Name: ABELARDO CONDE      Apgar1: 8  Apgar5: 9       EDC: Estimated Date of Delivery: Oct 3, 2022    Prenatal Labs:   Lab Results   Component Value Date    ABO O 2019    RH Pos 2019    AS Negative 2019    HEPBANG Nonreactive 2019    CHPCRT Negative 2019    GCPCRT Negative 2019    HGB 13.1 2022       GBS Status:   Lab Results   Component Value Date    GBS Negative 2022       Active Problem List  Patient Active Problem List   Diagnosis     Mild intermittent asthma     CARDIOVASCULAR SCREENING; LDL GOAL LESS THAN 160     Dairy product intolerance     Labor and delivery, indication for care     Normal labor and delivery     Family history of malignant neoplasm of ovary     Thrush, oral     Vaccine or biological substance causing adverse effect in therapeutic use, initial encounter     Adjustment disorder with mixed anxiety and depressed mood     Indication for care in labor or delivery       Medication Prior to Admission  Medications Prior to Admission   Medication Sig Dispense Refill Last Dose     albuterol (PROAIR HFA/PROVENTIL HFA/VENTOLIN HFA) 108 (90 Base)  MCG/ACT inhaler Inhale 2 puffs into the lungs every 6 hours 18 g 1 More than a month at Unknown time     calcium carbonate (TUMS) 500 MG chewable tablet Take 1 chew tab by mouth as needed for heartburn   2022 at Unknown time     famotidine (PEPCID) 10 MG tablet Take 10 mg by mouth daily   2022 at Unknown time     fluticasone-salmeterol (WIXELA INHUB) 250-50 MCG/DOSE inhaler Inhale 1 puff into the lungs 2 times daily 60 each 4 2022 at Unknown time     Prenatal Multivit-Min-Fe-FA (PRE- PO)    2022 at Unknown time   .        Maternal Past Medical History:     Past Medical History:   Diagnosis Date     Asthma     Lifelong, currently stable.  No inhaler use for 3months, and infrequent use during preg (19)     NO ACTIVE PROBLEMS                        Family History:   This patient has no significant family history            Social History:     Social History     Tobacco Use     Smoking status: Never Smoker     Smokeless tobacco: Never Used   Substance Use Topics     Alcohol use: Not Currently     Comment: not while pregnant            Review of Systems:   C: NEGATIVE for fever, chills, change in weight  E/M: NEGATIVE for ear, mouth and throat problems  R: NEGATIVE for significant cough or SOB  CV: NEGATIVE for chest pain, palpitations or peripheral edema          Physical Exam:   Vitals were reviewed    Constitutional: Awake, alert, cooperative, no apparent distress, and appears stated age.  ENT: Normocephalic, without obvious abnormality, atraumatic  Neck: Supple, symmetrical, trachea midline, no adenopathy, thyroid symmetric, not enlarged and no tenderness, skin normal.  Hematologic / Lymphatic: No cervical lymphadenopathy and no supraclavicular lymphadenopathy.  Back: Symmetric, no curvature, spinous processes are non-tender on palpation, paraspinous muscles are non-tender on palpation, no costal vertebral tenderness.  Lungs: No increased work of breathing, good air exchange, clear to  auscultation bilaterally, no crackles or wheezing.  Cardiovascular: Regular rate and rhythm, normal S1 and S2, no S3 or S4, and no murmur noted.  Abdomen: Pfannenstiel scar, normal bowel sounds, soft, non-distended, non-tender, no masses palpated, no hepatosplenomegally.  Genitourinary: No urethral discharge, normal external genitalia, no hernia.  Right sided bartholin cyst approx 3 cm in size  Musculoskeletal: No redness, warmth, or swelling of the joints.  Full range of motion noted.    Neurologic: Awake, alert, oriented to name, place and time.  Cranial nerves II-XII are grossly intact.   Neuropsychiatric: Normal affect, mood, orientation, memory and insight.  Skin: No rashes, erythema, pallor, petechia or purpura.     Cervix:   Membranes: intact    Presentation:Cephalic  Fetal Heart Rate Tracing: reactive and reassuring, Tier 1 (normal)  Tocometer: external monitor, rare contractions                       Assessment:   Cindi Padilla is a 39w3d pregnant female admitted with .  History of previous  delivery - desires repeat.  Long standing bartholin cyst.  Symptomatic during her pregnancy.  Planning drainage in OR.  Recommend marsupialization at the same time to reduce her risk of recurrence.          Plan:   Prepare for  section  Will perform bartholin gland marsupialization.  Reviewed risks/benefits/alternatives and consent obtained.   All questions addressed.    Marely Fuller MD

## 2022-09-29 NOTE — BRIEF OP NOTE
[facility] Brief Operative Note    Pre-operative diagnosis: Previous  section [Z98.891]  Bartholin gland cyst   Post-operative diagnosis Term Intrauterine pregnancy at 39+3 wks, History of previous  delivery, desires repeat, Bartholin gland cyst    Procedure: Procedure(s):  REPEAT  SECTION   Surgeon(s): Surgeon(s) and Role:     * Marely Fuller MD - Primary   Estimated blood loss: 395 mL    Specimens: ID Type Source Tests Collected by Time Destination   A :  Cord blood Umbilical Cord OR DOCUMENTATION ONLY Tonya Johnson RN 2022  9:44 AM    B :  Tissue Umbilical Cord OR DOCUMENTATION ONLY Marely Fuller MD 2022  9:44 AM    C :  Placenta Placenta SPECIMEN DISCARDED Marely Fluler MD 2022  9:44 AM       Findings: 8 lb 1 oz male infant with Apgars of 9 and 9 delivered from HARRI[facility] presentation, double nuchal cord, nml appearing uterus, fallopian tubes and ovaries, double layer uterine closure, right bartholin 1 cm in size and minimally enlarged in operating room, right vulvar swelling from pregnancy - given depth of dissection required to accomplish marsupialization I recommend holding on this procedure and this gets readdressed postpartum. Bartholin gland cyst is long standing and has never required I&D so expectant management would be appropriate as well

## 2022-09-29 NOTE — OP NOTE
Procedure Date: 2022    PREOPERATIVE DIAGNOSES:    1.  Term intrauterine pregnancy at 39+3 weeks' gestation.  2.  History of previous  delivery, desiring repeat.  3.  Bartholin gland cyst.    POSTOPERATIVE DIAGNOSES:    1.  Term intrauterine pregnancy at 39+3 weeks' gestation.  2.  History of previous  delivery, desiring repeat.  3.  Bartholin gland cyst.    PROCEDURE PERFORMED:  Repeat low transverse  delivery via Pfannenstiel skin incision.    ATTENDING SURGEON:  Marely Fuller MD    ANESTHESIA:  Spinal.    QUANTITATIVE BLOOD LOSS:  395 mL    INTRAVENOUS FLUIDS:  1200 mL    URINE OUTPUT:  550 mL    COMPLICATIONS:  None.    SPECIMENS:  Placenta discarded.    DRAINS:  Farias to dependent drainage with clear yellow urine at the completion of the procedure.    INDICATIONS FOR PROCEDURE:  The patient is a 31-year-old  2, para 1 at 39+3 weeks gestational age, who was admitted to undergo a repeat low transverse  delivery.  She has a longstanding Bartholin gland cyst that she had been counseled to consider incision and drainage at the time of her procedure.  I also spoke to her about marsupialization prior to proceeding to the operating room.  The risks, benefits and alternatives to the procedure were discussed with the patient and consent was obtained prior to proceeding to the operating room.      FINDINGS:  Examination under anesthesia revealed a very small Bartholin cyst that was approximately 1 cm in size in the deep vulvar tissues.  She has vulvar swelling related to late pregnancy and thus given the lack of acute abscess or significant swelling in the area that I felt like the risk of bleeding and scarring with this procedure at this particular time exceeded the benefits.  The Bartholin gland cyst be readdressed at a later and certainly if this was enlarged, it would make incision and drainage more clinically appropriate.  She delivered an 8 pound 1 ounce male infant with  Apgar scores of nine and nine from a right occiput anterior presentation.  There was a double nuchal cord, which was reduced to allow the delivery of the body and shoulders of the infant.  She had normal-appearing uterus, fallopian tubes, and ovaries.  She had a double double-layer uterine closure completed.    DESCRIPTION OF PROCEDURE:  A timeout was completed, verifying correct sites, correct patient positioning, site, and special equipment.  We also confirmed that the change in her consent to forego any procedures on her Bartholin cyst and she was consented for repeat .  She received Ancef for preoperative antibiotic prophylaxis.  Farias catheter was placed for bladder drainage prior to the start of the procedure.  She was prepped and draped in the usual sterile fashion.  Examination under anesthesia was carried out with the above-noted findings and was discussed with Cindi in the operating room.  She was then prepped and draped in the usual sterile fashion.  A Pfannenstiel skin incision was made with the scalpel and carried through to the underlying layer of fascia.  The fascia was incised in the midline and extended laterally with use of Lui scissors.  The superior aspect of the fascial incision was then grasped with Kocher clamps and the underlying rectus muscles dissected off sharply.  In identical fashion, the inferior aspect of the fascial incision was grasped and the underlying rectus muscles dissected off sharply.  The rectus muscles were then divided in the midline.  The peritoneal cavity was carefully entered.  Peritoneal incision was extended superiorly and inferiorly with good visualization of the bladder below.  The bladder blade was placed and the vesicouterine peritoneum was tented and a bladder reflection was created.  A low transverse uterine incision was made with a scalpel and this was bluntly extended.  Copious clear amniotic fluid was encountered.  With fundal pressure and gentle  guidance of my hand, we were able to bring the head through the incision.  I was then able to reduce the double nuchal cord, allowing delivery of the body and shoulders of the infant.  Delayed cord clamping was performed.  The infant was oropharyngeal bulb suctioned as needed.  The baby was then taken to the warmer.  Placenta was expressed and delivered in its entirety.  The uterus was exteriorized and cleared of all clot and debris.  The uterine incision was closed with 0 Vicryl in a running locked fashion.  A second layer of the same suture was used in imbricating fashion to facilitate uterine closure.  The uterus was then returned to the abdomen.  The pelvis was thoroughly irrigated and cleared of all clot and debris.  The uterine incision was reinspected and found to be hemostatic.  The rectus muscles were reapproximated in the midline using 0 Vicryl in an interrupted figure-of-eight fashion.  Fascia was closed with 0 PDS in a running fashion.  Subcutaneous tissue was thoroughly irrigated and found to be hemostatic with Bovie cautery.  I did undermine and take down scar tissue to facilitate cosmetic healing.  The skin was closed with 4-0 Monocryl in a running subcuticular fashion.  Steri-Strips and sterile island dressing were then applied.    The patient tolerated the procedure well.  Sponge, lap, needle and instrument counts were correct x2.  The patient was taken to recovery room in stable condition.    Marely Fuller MD        D: 2022   T: 2022   MT: JEN    Name:     CHRIS CONDE  MRN:      [id number]        Account:        [id number]   :      1990           Procedure Date: 2022     Document: [id number]

## 2022-09-30 LAB — HGB BLD-MCNC: 11.4 G/DL (ref 11.7–15.7)

## 2022-09-30 PROCEDURE — 120N000012 HC R&B POSTPARTUM

## 2022-09-30 PROCEDURE — 85018 HEMOGLOBIN: CPT | Performed by: OBSTETRICS & GYNECOLOGY

## 2022-09-30 PROCEDURE — 250N000013 HC RX MED GY IP 250 OP 250 PS 637: Performed by: OBSTETRICS & GYNECOLOGY

## 2022-09-30 PROCEDURE — 250N000011 HC RX IP 250 OP 636: Performed by: OBSTETRICS & GYNECOLOGY

## 2022-09-30 PROCEDURE — 36415 COLL VENOUS BLD VENIPUNCTURE: CPT | Performed by: OBSTETRICS & GYNECOLOGY

## 2022-09-30 RX ADMIN — SENNOSIDES AND DOCUSATE SODIUM 1 TABLET: 50; 8.6 TABLET ORAL at 20:13

## 2022-09-30 RX ADMIN — IBUPROFEN 800 MG: 400 TABLET, FILM COATED ORAL at 17:00

## 2022-09-30 RX ADMIN — ACETAMINOPHEN 975 MG: 325 TABLET, FILM COATED ORAL at 04:57

## 2022-09-30 RX ADMIN — IBUPROFEN 800 MG: 400 TABLET, FILM COATED ORAL at 11:18

## 2022-09-30 RX ADMIN — ACETAMINOPHEN 975 MG: 325 TABLET, FILM COATED ORAL at 23:00

## 2022-09-30 RX ADMIN — ACETAMINOPHEN 975 MG: 325 TABLET, FILM COATED ORAL at 17:00

## 2022-09-30 RX ADMIN — KETOROLAC TROMETHAMINE 30 MG: 30 INJECTION, SOLUTION INTRAMUSCULAR; INTRAVENOUS at 04:57

## 2022-09-30 RX ADMIN — OXYCODONE HYDROCHLORIDE 5 MG: 5 TABLET ORAL at 23:00

## 2022-09-30 RX ADMIN — ACETAMINOPHEN 975 MG: 325 TABLET, FILM COATED ORAL at 11:18

## 2022-09-30 RX ADMIN — SIMETHICONE 80 MG: 80 TABLET, CHEWABLE ORAL at 22:13

## 2022-09-30 RX ADMIN — IBUPROFEN 800 MG: 400 TABLET, FILM COATED ORAL at 23:00

## 2022-09-30 RX ADMIN — SENNOSIDES AND DOCUSATE SODIUM 1 TABLET: 50; 8.6 TABLET ORAL at 07:55

## 2022-09-30 ASSESSMENT — ACTIVITIES OF DAILY LIVING (ADL)
ADLS_ACUITY_SCORE: 23
ADLS_ACUITY_SCORE: 23
ADLS_ACUITY_SCORE: 26
ADLS_ACUITY_SCORE: 23

## 2022-09-30 NOTE — PLAN OF CARE
Vital signs stable. Postpartum assessment WDL. Incision dry intact scant old drainage no change. No signs infection. Pain controlled with tylenol and Toradol. Patient ambulating independently. Patient  passing gas. Breastfeeding. Patient and infant bonding well. Will continue with current plan of care.

## 2022-09-30 NOTE — PLAN OF CARE
Vss, fundus firm with scant flow. Incision intact with steri strips. Wearing abdominal binder for comfort and taking tylenol and ibuprofen for adequate pain control. Pt is tolerating regular diet and has good po intake. Pt up independently  and voiding spontaneously. Breast feeding  well. Spouse at bedside and supportive. Encouraged to call with questions/needs.

## 2022-09-30 NOTE — PROGRESS NOTES
[facility]       DAILY NOTE - POSTPARTUM DAY 1     SUBJECTIVE:     Pain controlled? Yes  Tolerating a regular diet? YES  Ambulating? YES  Voiding without difficulty? Yes  Baby doing well.  Breast feeding.     OBJECTIVE:  Vitals:    22 1710 22 1944 22 2329 22 0512   BP: 100/67 101/65 105/69 97/61   BP Location: Left arm   Left arm   Patient Position:       Pulse: 80 73 65 85   Resp: 16 16 16 16   Temp: 97.9  F (36.6  C) 97.6  F (36.4  C) 97.8  F (36.6  C) 97.9  F (36.6  C)   TempSrc: Oral  Oral    SpO2:   95% 99%   Weight:       Height:           Constitutional: healthy, alert and no distress    Abdomen:  Uterine fundus is firm, non-tender and at the level of the umbilicus     Incision: Healing well, well approximated, without signs of infection and sutures in place, dressing in place    Ext: no edema, no CT      LABS:  Hemoglobin   Date Value Ref Range Status   2022 13.1 11.7 - 15.7 g/dL Final   2021 15.6 11.7 - 15.7 g/dL Final   2021 13.0 11.7 - 15.7 g/dL Final   2019 9.7 (L) 11.7 - 15.7 g/dL Final       ASSESSMENT:  Post-partum day #1 s/p  Section  Pregnancy complicated by NO COMPLICATIONS    Doing well.  No immediate surgical complications identified.  No excessive bleeding  Pain well-controlled.       PLAN:   Ambulation encouraged  Pain control measures as needed  Continue routine postpartum cares  Anticipate discharge tomorrow    Marely Fuller MD

## 2022-10-01 PROCEDURE — 120N000012 HC R&B POSTPARTUM

## 2022-10-01 PROCEDURE — 250N000013 HC RX MED GY IP 250 OP 250 PS 637: Performed by: OBSTETRICS & GYNECOLOGY

## 2022-10-01 RX ADMIN — IBUPROFEN 800 MG: 400 TABLET, FILM COATED ORAL at 11:06

## 2022-10-01 RX ADMIN — OXYCODONE HYDROCHLORIDE 5 MG: 5 TABLET ORAL at 13:42

## 2022-10-01 RX ADMIN — ACETAMINOPHEN 975 MG: 325 TABLET, FILM COATED ORAL at 11:06

## 2022-10-01 RX ADMIN — OXYCODONE HYDROCHLORIDE 5 MG: 5 TABLET ORAL at 18:21

## 2022-10-01 RX ADMIN — SIMETHICONE 80 MG: 80 TABLET, CHEWABLE ORAL at 03:17

## 2022-10-01 RX ADMIN — SENNOSIDES AND DOCUSATE SODIUM 1 TABLET: 50; 8.6 TABLET ORAL at 19:53

## 2022-10-01 RX ADMIN — OXYCODONE HYDROCHLORIDE 5 MG: 5 TABLET ORAL at 22:59

## 2022-10-01 RX ADMIN — OXYCODONE HYDROCHLORIDE 5 MG: 5 TABLET ORAL at 03:17

## 2022-10-01 RX ADMIN — IBUPROFEN 800 MG: 400 TABLET, FILM COATED ORAL at 22:59

## 2022-10-01 RX ADMIN — IBUPROFEN 800 MG: 400 TABLET, FILM COATED ORAL at 17:02

## 2022-10-01 RX ADMIN — OXYCODONE HYDROCHLORIDE 5 MG: 5 TABLET ORAL at 08:55

## 2022-10-01 RX ADMIN — ACETAMINOPHEN 975 MG: 325 TABLET, FILM COATED ORAL at 17:02

## 2022-10-01 RX ADMIN — ACETAMINOPHEN 975 MG: 325 TABLET, FILM COATED ORAL at 04:56

## 2022-10-01 RX ADMIN — ACETAMINOPHEN 975 MG: 325 TABLET, FILM COATED ORAL at 22:59

## 2022-10-01 RX ADMIN — SENNOSIDES AND DOCUSATE SODIUM 2 TABLET: 50; 8.6 TABLET ORAL at 08:55

## 2022-10-01 RX ADMIN — IBUPROFEN 800 MG: 400 TABLET, FILM COATED ORAL at 04:56

## 2022-10-01 ASSESSMENT — ACTIVITIES OF DAILY LIVING (ADL)
ADLS_ACUITY_SCORE: 23

## 2022-10-01 NOTE — PLAN OF CARE
Pt taking tylenol, ibuprofen and now oxycodone for pain, pt states that pain has started to increase this evening, simethicone given for gas pain, using heat pads, steri strips intact, VSS, tolerating diet, voiding adequate amounts. Encouraged to ambulate in hallways. Baby breastfeeding well. Encouraged to call with any questions or concerns. Will continue to monitor.

## 2022-10-01 NOTE — PLAN OF CARE
Vss, fundus firm with scant flow. Incision intact with steri strips. Pt wearing abdominal binder for comfort. Taking tylenol, ibuprofen and oxycodone  for adequate pain control. Pt tolerating regular diet and good po intake. Pt voiding spontaneously. Breast feeding  well. Occasionally supplementing with bottle. Spouse at bedside and supportive. Encouraged to call with questions/needs.

## 2022-10-01 NOTE — PROGRESS NOTES
[facility]       DAILY NOTE - POSTPARTUM DAY 2     SUBJECTIVE:     Pain controlled? Yes, for the most part. Has increased pain when walking.   Tolerating a regular diet? YES  Ambulating? YES  Voiding without difficulty? Yes    OBJECTIVE:  Vitals:    10/01/22 0000 10/01/22 0317 10/01/22 0456 10/01/22 0900   BP:    101/67   BP Location:       Patient Position:    Semi-Carroll's   Pulse:       Resp: 16 16 16 16   Temp:    97.9  F (36.6  C)   TempSrc:    Oral   SpO2:       Weight:       Height:           Constitutional: healthy, alert and no distress    Abdomen:  Uterine fundus is firm, non-tender and at the level of the umbilicus     Incision: Healing well and well approximated      LABS:  Hemoglobin   Date Value Ref Range Status   2022 11.4 (L) 11.7 - 15.7 g/dL Final   2022 13.1 11.7 - 15.7 g/dL Final   2021 13.0 11.7 - 15.7 g/dL Final   2019 9.7 (L) 11.7 - 15.7 g/dL Final       ASSESSMENT:  Post-partum day #2 s/p  Section  Pregnancy complicated by NO COMPLICATIONS    Doing well.       PLAN:   Continue routine postpartum cares  Anticipate discharge tomorrow    Marga Salgado MD

## 2022-10-02 VITALS
OXYGEN SATURATION: 100 % | SYSTOLIC BLOOD PRESSURE: 107 MMHG | BODY MASS INDEX: 30.66 KG/M2 | RESPIRATION RATE: 16 BRPM | HEART RATE: 68 BPM | HEIGHT: 65 IN | TEMPERATURE: 98 F | DIASTOLIC BLOOD PRESSURE: 66 MMHG | WEIGHT: 184 LBS

## 2022-10-02 PROCEDURE — 250N000013 HC RX MED GY IP 250 OP 250 PS 637: Performed by: OBSTETRICS & GYNECOLOGY

## 2022-10-02 RX ORDER — AMOXICILLIN 250 MG
1 CAPSULE ORAL 2 TIMES DAILY PRN
Qty: 30 TABLET | Refills: 0 | Status: SHIPPED | OUTPATIENT
Start: 2022-10-02 | End: 2022-12-08

## 2022-10-02 RX ORDER — ACETAMINOPHEN 325 MG/1
975 TABLET ORAL EVERY 6 HOURS
COMMUNITY
Start: 2022-10-02 | End: 2022-12-08

## 2022-10-02 RX ORDER — IBUPROFEN 800 MG/1
800 TABLET, FILM COATED ORAL EVERY 6 HOURS PRN
Qty: 30 TABLET | Refills: 0 | Status: SHIPPED | OUTPATIENT
Start: 2022-10-02 | End: 2023-12-11

## 2022-10-02 RX ORDER — OXYCODONE HYDROCHLORIDE 5 MG/1
5 TABLET ORAL EVERY 4 HOURS PRN
Qty: 20 TABLET | Refills: 0 | Status: SHIPPED | OUTPATIENT
Start: 2022-10-02 | End: 2022-12-08

## 2022-10-02 RX ADMIN — IBUPROFEN 800 MG: 400 TABLET, FILM COATED ORAL at 11:05

## 2022-10-02 RX ADMIN — SENNOSIDES AND DOCUSATE SODIUM 2 TABLET: 50; 8.6 TABLET ORAL at 08:45

## 2022-10-02 RX ADMIN — ACETAMINOPHEN 975 MG: 325 TABLET, FILM COATED ORAL at 11:05

## 2022-10-02 RX ADMIN — ACETAMINOPHEN 975 MG: 325 TABLET, FILM COATED ORAL at 04:46

## 2022-10-02 RX ADMIN — OXYCODONE HYDROCHLORIDE 5 MG: 5 TABLET ORAL at 04:46

## 2022-10-02 RX ADMIN — OXYCODONE HYDROCHLORIDE 5 MG: 5 TABLET ORAL at 08:45

## 2022-10-02 RX ADMIN — IBUPROFEN 800 MG: 400 TABLET, FILM COATED ORAL at 04:46

## 2022-10-02 ASSESSMENT — ACTIVITIES OF DAILY LIVING (ADL)
ADLS_ACUITY_SCORE: 23

## 2022-10-02 NOTE — DISCHARGE SUMMARY
[facility] Discharge Summary    Cindi Padilla MRN# [id number]   Age: 31 year old YOB: 1990     Date of Admission:  2022  Date of Discharge::  10/2/2022   Admitting Physician:  Marely Fuller MD  Discharge Physician:  Marely Fuller MD     Home clinic: [facility] [place]          Admission Diagnoses:   1.  Term intrauterine pregnancy at 39+3 weeks' gestation.  2.  History of previous  delivery, desiring repeat.  3.  Bartholin gland cyst.          Discharge Diagnosis:   1.  Term intrauterine pregnancy at 39+3 weeks' gestation.  2.  History of previous  delivery, desiring repeat.  3.  Bartholin gland cyst.          Procedures:   Procedure(s): Repeat low transverse  delivery via Pfannenstiel skin incision.                Medications Prior to Admission:     Medications Prior to Admission   Medication Sig Dispense Refill Last Dose     albuterol (PROAIR HFA/PROVENTIL HFA/VENTOLIN HFA) 108 (90 Base) MCG/ACT inhaler Inhale 2 puffs into the lungs every 6 hours 18 g 1 More than a month at Unknown time     calcium carbonate (TUMS) 500 MG chewable tablet Take 1 chew tab by mouth as needed for heartburn   2022 at Unknown time     famotidine (PEPCID) 10 MG tablet Take 10 mg by mouth daily   2022 at Unknown time     fluticasone-salmeterol (WIXELA INHUB) 250-50 MCG/DOSE inhaler Inhale 1 puff into the lungs 2 times daily 60 each 4 2022 at Unknown time     Prenatal Multivit-Min-Fe-FA (PRE-SHERON PO) Take 1 tablet by mouth daily   2022 at Unknown time             Discharge Medications:     Current Discharge Medication List      START taking these medications    Details   acetaminophen (TYLENOL) 325 MG tablet Take 3 tablets (975 mg) by mouth every 6 hours    Associated Diagnoses:  delivery delivered      ibuprofen (ADVIL/MOTRIN) 800 MG tablet Take 1 tablet (800 mg) by mouth every 6 hours as needed for moderate pain  Qty: 30 tablet, Refills: 0    Associated Diagnoses:   delivery delivered      oxyCODONE (ROXICODONE) 5 MG tablet Take 1 tablet (5 mg) by mouth every 4 hours as needed for moderate pain  Qty: 20 tablet, Refills: 0    Associated Diagnoses:  delivery delivered      senna-docusate (SENOKOT-S/PERICOLACE) 8.6-50 MG tablet Take 1 tablet by mouth 2 times daily as needed for constipation  Qty: 30 tablet, Refills: 0    Associated Diagnoses:  delivery delivered         CONTINUE these medications which have NOT CHANGED    Details   albuterol (PROAIR HFA/PROVENTIL HFA/VENTOLIN HFA) 108 (90 Base) MCG/ACT inhaler Inhale 2 puffs into the lungs every 6 hours  Qty: 18 g, Refills: 1    Comments: Pharmacy may dispense brand covered by insurance (Proair, or proventil or ventolin or generic albuterol inhaler)  Associated Diagnoses: Moderate persistent asthma without complication      calcium carbonate (TUMS) 500 MG chewable tablet Take 1 chew tab by mouth as needed for heartburn      famotidine (PEPCID) 10 MG tablet Take 10 mg by mouth daily      fluticasone-salmeterol (WIXELA INHUB) 250-50 MCG/DOSE inhaler Inhale 1 puff into the lungs 2 times daily  Qty: 60 each, Refills: 4    Associated Diagnoses: Moderate persistent asthma without complication      Prenatal Multivit-Min-Fe-FA (PRE- PO) Take 1 tablet by mouth daily                   Consultations:   No consultations were requested during this admission          Brief History of Labor:   The patient is a 31-year-old  2, para 1 at 39+3 weeks gestational age, who was admitted to undergo a repeat low transverse  delivery.         Hospital Course:   The patient's hospital course was unremarkable.  She recovered as anticipated and experienced no post-operative complications. On discharge, her pain was well controlled. Vaginal bleeding is similar to peak menstrual flow.  Voiding without difficulty.  Ambulating well and tolerating a normal diet.  No fever or significant wound drainage.  Breastfeeding  was going well.  Infant is stable. She was discharged on post-partum day #3.    Post-partum hemoglobin:   Hemoglobin   Date Value Ref Range Status   09/30/2022 11.4 (L) 11.7 - 15.7 g/dL Final   01/01/2021 13.0 11.7 - 15.7 g/dL Final             Discharge Instructions and Follow-Up:   Discharge diet: Regular   Discharge activity: Lifting restricted to 15 pounds  No driving or operating machinery while on narcotic analgesics  Pelvic rest: abstain from intercourse and do not use tampons for 6 week(s)   Discharge follow-up: Follow up with OB provider in 1-2 weeks for incision check, 6 weeks for postpartum visit   Wound care: Drink plenty of fluids  Ice to area for comfort  Keep wound clean and dry  Steri-strips off in 7-10 days           Discharge Disposition:   Discharged to home      Attestation:  I have reviewed today's vital signs, notes, medications, labs and imaging.  Amount of time performed on this discharge summary: 10 minutes.    Marely Fuller MD

## 2022-10-02 NOTE — PLAN OF CARE
D: VSS, assessments WDL.   I: Pt. received complete discharge paperwork and home medications as filled by discharge pharmacy to include oxycodone, ibuprofen and senna.  Pt. was given times of last dose for all discharge medications in writing on discharge medication sheets.  Discharge teaching included home medication, pain management, activity restrictions, postpartum cares, and signs and symptoms of infection.    A: Discharge outcomes on care plan met.  Mother states understanding and comfort with self and baby cares.  P: Pt. discharged to home.  Pt. was discharged with baby, and bands were checked at time of discharge.  Pt. was accompanied by , nurse and baby, and left with personal belongings.   Pt. to follow up with OB per MD order.  Pt. had no further questions at the time of discharge and no unmet needs were identified.

## 2022-10-02 NOTE — LACTATION NOTE
Routine Lactation visit with Cindi, significant other Elías & baby beatriz Blair. Getting ready for discharge. Cindi reports feeding is going well. She shared her milk has come in this morning and she's happy it's going so well as it didn't go well with her first babe. She has no questions or concerns.  Encouraged using Feeding Log for home use. Encouraged to review Breastfeeding section in Your Guide to Postpartum [facility] [place] [facility].    Reviewed milk supply and engorgement. Reviewed typical timeline of milk supply initiation and progression over first 3-5 days postpartum. Discussed comfort measures for engorgement, plugged duct treatment, and warning signs of breast infection. Reviewed when pumping is helpful and when it would be necessary.    Feeding plan: Recommend unlimited, frequent breast feedings: At least 8 - 12 times every 24 hours. Avoid pacifiers and supplementation with formula unless medically indicated. Encouraged use of feeding log and to record feedings, and void/stool patterns. Cindi has a breast pump for home use. Follow up with [facility]'s, recommended follow up with Lactation in clinic as needed. Reviewed outpatient lactation resources. Cindi & Elías very appreciative of visit.    Leighann Tinajero, RN-C, IBCLC, MNN, PHN, BSN

## 2022-10-02 NOTE — PROGRESS NOTES
[facility]       DAILY NOTE - POSTPARTUM DAY 3     SUBJECTIVE:     Pain controlled? Yes  Tolerating a regular diet? YES  Ambulating? YES  Voiding without difficulty? Yes  Breast feeding going well.     OBJECTIVE:  Vitals:    10/01/22 2300 10/02/22 0305 10/02/22 0446 10/02/22 0800   BP: 108/70   107/66   BP Location:    Left arm   Patient Position:       Pulse: 68   68   Resp: 16 16 16 16   Temp: 97.5  F (36.4  C)   98  F (36.7  C)   TempSrc: Oral   Oral   SpO2:       Weight:       Height:           Constitutional: healthy, alert and no distress    Abdomen:  Uterine fundus is firm, non-tender and at the level of the umbilicus     Incision: Healing well, without signs of infection and sutures in place    Ext: trace edema, no CT      LABS:  Hemoglobin   Date Value Ref Range Status   2022 11.4 (L) 11.7 - 15.7 g/dL Final   2022 13.1 11.7 - 15.7 g/dL Final   2021 13.0 11.7 - 15.7 g/dL Final   2019 9.7 (L) 11.7 - 15.7 g/dL Final       ASSESSMENT:  Post-partum day #3 s/p  Section  Pregnancy complicated by NO COMPLICATIONS    Doing well.  No immediate surgical complications identified.  No excessive bleeding  Pain well-controlled.       PLAN:   Ambulation encouraged  Monitor wound for signs of infection  Pain control measures as needed  Discharge today.  Return to clinic in 2 and 6 weeks.  Continue routine postpartum cares  Discharge instructions reviewed.    Marely Fuller MD

## 2022-11-15 ENCOUNTER — LAB REQUISITION (OUTPATIENT)
Dept: LAB | Facility: CLINIC | Age: 32
End: 2022-11-15

## 2022-11-15 PROCEDURE — 87624 HPV HI-RISK TYP POOLED RSLT: CPT | Performed by: OBSTETRICS & GYNECOLOGY

## 2022-11-15 PROCEDURE — G0145 SCR C/V CYTO,THINLAYER,RESCR: HCPCS | Performed by: OBSTETRICS & GYNECOLOGY

## 2022-11-17 LAB
BKR LAB AP GYN ADEQUACY: NORMAL
BKR LAB AP GYN INTERPRETATION: NORMAL
BKR LAB AP HPV REFLEX: NORMAL
BKR LAB AP LMP: NORMAL
BKR LAB AP PREVIOUS ABNL DX: NORMAL
BKR LAB AP PREVIOUS ABNORMAL: NORMAL
PATH REPORT.COMMENTS IMP SPEC: NORMAL
PATH REPORT.COMMENTS IMP SPEC: NORMAL
PATH REPORT.RELEVANT HX SPEC: NORMAL

## 2022-11-21 LAB
HUMAN PAPILLOMA VIRUS 16 DNA: NEGATIVE
HUMAN PAPILLOMA VIRUS 18 DNA: NEGATIVE
HUMAN PAPILLOMA VIRUS FINAL DIAGNOSIS: NORMAL
HUMAN PAPILLOMA VIRUS OTHER HR: NEGATIVE

## 2022-12-07 ASSESSMENT — ENCOUNTER SYMPTOMS
JOINT SWELLING: 0
SORE THROAT: 0
COUGH: 0
PALPITATIONS: 0
MYALGIAS: 0
WEAKNESS: 0
NERVOUS/ANXIOUS: 0
SHORTNESS OF BREATH: 0
FEVER: 0
ARTHRALGIAS: 0
CONSTIPATION: 0
DIARRHEA: 0
ABDOMINAL PAIN: 0
NAUSEA: 0
CHILLS: 0
BREAST MASS: 0
HEMATOCHEZIA: 0
FREQUENCY: 0
EYE PAIN: 0
HEMATURIA: 0
DIZZINESS: 0
HEADACHES: 0
DYSURIA: 0
HEARTBURN: 0
PARESTHESIAS: 0

## 2022-12-07 ASSESSMENT — ASTHMA QUESTIONNAIRES
QUESTION_1 LAST FOUR WEEKS HOW MUCH OF THE TIME DID YOUR ASTHMA KEEP YOU FROM GETTING AS MUCH DONE AT WORK, SCHOOL OR AT HOME: NONE OF THE TIME
ACT_TOTALSCORE: 23
QUESTION_5 LAST FOUR WEEKS HOW WOULD YOU RATE YOUR ASTHMA CONTROL: WELL CONTROLLED
QUESTION_4 LAST FOUR WEEKS HOW OFTEN HAVE YOU USED YOUR RESCUE INHALER OR NEBULIZER MEDICATION (SUCH AS ALBUTEROL): NOT AT ALL
QUESTION_2 LAST FOUR WEEKS HOW OFTEN HAVE YOU HAD SHORTNESS OF BREATH: ONCE OR TWICE A WEEK
QUESTION_3 LAST FOUR WEEKS HOW OFTEN DID YOUR ASTHMA SYMPTOMS (WHEEZING, COUGHING, SHORTNESS OF BREATH, CHEST TIGHTNESS OR PAIN) WAKE YOU UP AT NIGHT OR EARLIER THAN USUAL IN THE MORNING: NOT AT ALL
ACT_TOTALSCORE: 23

## 2022-12-08 ENCOUNTER — OFFICE VISIT (OUTPATIENT)
Dept: FAMILY MEDICINE | Facility: CLINIC | Age: 32
End: 2022-12-08
Payer: COMMERCIAL

## 2022-12-08 VITALS
RESPIRATION RATE: 12 BRPM | WEIGHT: 166 LBS | DIASTOLIC BLOOD PRESSURE: 75 MMHG | TEMPERATURE: 97.8 F | HEIGHT: 65 IN | HEART RATE: 70 BPM | BODY MASS INDEX: 27.66 KG/M2 | OXYGEN SATURATION: 97 % | SYSTOLIC BLOOD PRESSURE: 111 MMHG

## 2022-12-08 DIAGNOSIS — J45.20 MILD INTERMITTENT ASTHMA WITHOUT COMPLICATION: ICD-10-CM

## 2022-12-08 DIAGNOSIS — Z00.00 ENCOUNTER FOR ROUTINE ADULT HEALTH EXAMINATION WITHOUT ABNORMAL FINDINGS: Primary | ICD-10-CM

## 2022-12-08 DIAGNOSIS — Z13.6 CARDIOVASCULAR SCREENING; LDL GOAL LESS THAN 160: ICD-10-CM

## 2022-12-08 LAB
ANION GAP SERPL CALCULATED.3IONS-SCNC: 11 MMOL/L (ref 7–15)
BUN SERPL-MCNC: 13.6 MG/DL (ref 6–20)
CALCIUM SERPL-MCNC: 9.4 MG/DL (ref 8.6–10)
CHLORIDE SERPL-SCNC: 105 MMOL/L (ref 98–107)
CHOLEST SERPL-MCNC: 193 MG/DL
CREAT SERPL-MCNC: 0.96 MG/DL (ref 0.51–0.95)
DEPRECATED HCO3 PLAS-SCNC: 23 MMOL/L (ref 22–29)
ERYTHROCYTE [DISTWIDTH] IN BLOOD BY AUTOMATED COUNT: 11.7 % (ref 10–15)
GFR SERPL CREATININE-BSD FRML MDRD: 81 ML/MIN/1.73M2
GLUCOSE SERPL-MCNC: 96 MG/DL (ref 70–99)
HCT VFR BLD AUTO: 42.4 % (ref 35–47)
HDLC SERPL-MCNC: 50 MG/DL
HGB BLD-MCNC: 14.6 G/DL (ref 11.7–15.7)
LDLC SERPL CALC-MCNC: 126 MG/DL
MCH RBC QN AUTO: 31.8 PG (ref 26.5–33)
MCHC RBC AUTO-ENTMCNC: 34.4 G/DL (ref 31.5–36.5)
MCV RBC AUTO: 92 FL (ref 78–100)
NONHDLC SERPL-MCNC: 143 MG/DL
PLATELET # BLD AUTO: 274 10E3/UL (ref 150–450)
POTASSIUM SERPL-SCNC: 4.4 MMOL/L (ref 3.4–5.3)
RBC # BLD AUTO: 4.59 10E6/UL (ref 3.8–5.2)
SODIUM SERPL-SCNC: 139 MMOL/L (ref 136–145)
TRIGL SERPL-MCNC: 85 MG/DL
WBC # BLD AUTO: 5.9 10E3/UL (ref 4–11)

## 2022-12-08 PROCEDURE — 80048 BASIC METABOLIC PNL TOTAL CA: CPT | Performed by: PHYSICIAN ASSISTANT

## 2022-12-08 PROCEDURE — 36415 COLL VENOUS BLD VENIPUNCTURE: CPT | Performed by: PHYSICIAN ASSISTANT

## 2022-12-08 PROCEDURE — 85027 COMPLETE CBC AUTOMATED: CPT | Performed by: PHYSICIAN ASSISTANT

## 2022-12-08 PROCEDURE — 99395 PREV VISIT EST AGE 18-39: CPT | Performed by: PHYSICIAN ASSISTANT

## 2022-12-08 PROCEDURE — 80061 LIPID PANEL: CPT | Performed by: PHYSICIAN ASSISTANT

## 2022-12-08 RX ORDER — FLUTICASONE PROPIONATE AND SALMETEROL 250; 50 UG/1; UG/1
1 POWDER RESPIRATORY (INHALATION) EVERY 12 HOURS
Qty: 60 EACH | Refills: 3 | Status: SHIPPED | OUTPATIENT
Start: 2022-12-08 | End: 2023-12-11

## 2022-12-08 RX ORDER — SERTRALINE HYDROCHLORIDE 25 MG/1
25 TABLET, FILM COATED ORAL DAILY
COMMUNITY
Start: 2022-12-08

## 2022-12-08 ASSESSMENT — ENCOUNTER SYMPTOMS
BREAST MASS: 0
EYE PAIN: 0
FEVER: 0
SHORTNESS OF BREATH: 0
HEADACHES: 0
WEAKNESS: 0
SORE THROAT: 0
PARESTHESIAS: 0
CHILLS: 0
JOINT SWELLING: 0
ABDOMINAL PAIN: 0
DYSURIA: 0
NAUSEA: 0
COUGH: 0
NERVOUS/ANXIOUS: 0
HEARTBURN: 0
ARTHRALGIAS: 0
DIARRHEA: 0
FREQUENCY: 0
CONSTIPATION: 0
MYALGIAS: 0
HEMATURIA: 0
PALPITATIONS: 0
HEMATOCHEZIA: 0
DIZZINESS: 0

## 2022-12-08 ASSESSMENT — PAIN SCALES - GENERAL: PAINLEVEL: NO PAIN (0)

## 2022-12-08 NOTE — PROGRESS NOTES
SUBJECTIVE:   CC: Cindi is an 32 year old who presents for preventive health visit.   Patient has been advised of split billing requirements and indicates understanding: Yes  Healthy Habits:     Getting at least 3 servings of Calcium per day:  Yes    Bi-annual eye exam:  NO    Dental care twice a year:  Yes    Sleep apnea or symptoms of sleep apnea:  None    Diet:  Regular (no restrictions)    Frequency of exercise:  2-3 days/week    Duration of exercise:  30-45 minutes    Taking medications regularly:  Yes    Medication side effects:  None    PHQ-2 Total Score: 0    Additional concerns today:  No        HPI: Pt presented to clinic for routine annual wellness exam. Pt recently gave birth in September via . Incision has been healing nicely without discharge. Pt had IUD placed by OBGYN in Nov. Still minor spotting. Pt started on Sertraline for anxiety in September. Denies any post-partum depression symptoms.symptoms well controlled currently      Today's PHQ-2 Score:   PHQ-2 (  [employer]) 2022   Q1: Little interest or pleasure in doing things 0   Q2: Feeling down, depressed or hopeless 0   PHQ-2 Score 0   PHQ-2 Total Score (12-17 Years)- Positive if 3 or more points; Administer PHQ-A if positive -   Q1: Little interest or pleasure in doing things Not at all   Q2: Feeling down, depressed or hopeless Not at all   PHQ-2 Score 0           Social History     Tobacco Use     Smoking status: Never     Smokeless tobacco: Never   Substance Use Topics     Alcohol use: Not Currently     Comment: not while pregnant     If you drink alcohol do you typically have >3 drinks per day or >7 drinks per week? No    No flowsheet data found.    Reviewed orders with patient.  Reviewed health maintenance and updated orders accordingly - Yes  Lab work is in process    Breast Cancer Screening:    FHS-7:   Breast CA Risk Assessment (FHS-7) 2022   Did any of your first-degree relatives have breast or ovarian cancer? Yes    Did any of your relatives have bilateral breast cancer? No   Did any man in your family have breast cancer? No   Did any woman in your family have breast and ovarian cancer? Yes   Did any woman in your family have breast cancer before age 50 y? No   Do you have 2 or more relatives with breast and/or ovarian cancer? No   Do you have 2 or more relatives with breast and/or bowel cancer? No     click delete button to remove this line now  Patient under 40 years of age: Routine Mammogram Screening not recommended.   Pertinent mammograms are reviewed under the imaging tab.    History of abnormal Pap smear: NO - age 30- 65 PAP every 3 years recommended  PAP / HPV Latest Ref Rng & Units 11/15/2022   PAP   Negative for Intraepithelial Lesion or Malignancy (NILM)   HPV16 Negative Negative   HPV18 Negative Negative   HRHPV Negative Negative     Reviewed and updated as needed this visit by clinical staff   Tobacco  Allergies  Meds  Problems  Med Hx  Surg Hx  Fam Hx          Reviewed and updated as needed this visit by Provider   Tobacco    Problems  Med Hx  Surg Hx  Fam Hx             Review of Systems   Constitutional: Negative for chills and fever.   HENT: Negative for congestion, ear pain, hearing loss and sore throat.    Eyes: Negative for pain and visual disturbance.   Respiratory: Negative for cough and shortness of breath.    Cardiovascular: Negative for chest pain, palpitations and peripheral edema.   Gastrointestinal: Negative for abdominal pain, constipation, diarrhea, heartburn, hematochezia and nausea.   Breasts:  Negative for tenderness, breast mass and discharge.   Genitourinary: Positive for vaginal bleeding and vaginal discharge. Negative for dysuria, frequency, genital sores, hematuria, pelvic pain and urgency.   Musculoskeletal: Negative for arthralgias, joint swelling and myalgias.   Skin: Negative for rash.   Neurological: Negative for dizziness, weakness, headaches and paresthesias.  "  Psychiatric/Behavioral: Negative for mood changes. The patient is not nervous/anxious.           OBJECTIVE:   /75   Pulse 70   Temp 97.8  F (36.6  C) (Temporal)   Resp 12   Ht 1.651 m (5' 5\")   Wt 75.3 kg (166 lb)   LMP 11/02/2021   SpO2 97%   Breastfeeding No   BMI 27.62 kg/m    Physical Exam    GENERAL: healthy, alert and no distress  EYES: Eyes grossly normal to inspection, PERRL and conjunctivae and sclerae normal  HENT: ear canals and TM's normal, nose and mouth without ulcers or lesions  NECK: no adenopathy, no asymmetry, masses, or scars and thyroid normal to palpation  RESP: lungs clear to auscultation - no rales, rhonchi or wheezes  CV: regular rate and rhythm, normal S1 S2, no S3 or S4, no murmur, click or rub, no peripheral edema and peripheral pulses strong  ABDOMEN: soft, nontender, no hepatosplenomegaly, no masses and bowel sounds normal  MS: no gross musculoskeletal defects noted, no edema  SKIN: no suspicious lesions or rashes  NEURO: Normal strength and tone, mentation intact and speech normal  PSYCH: mentation appears normal, affect normal/bright    Diagnostic Test Results:  Labs reviewed in Epic    ASSESSMENT/PLAN:   (Z00.00) Encounter for routine adult health examination without abnormal findings  (primary encounter diagnosis)  Comment: Annual physical exam was unremarkable.   Plan: Continue with annual wellness exams     (J45.20) Mild intermittent asthma without complication  Comment: Seasonal asthma symptoms relieved with Advair. Pt denies needing to regularly use Albuterol MDI. Denies any worsening asthma symptoms.     Plan: fluticasone-salmeterol (ADVAIR) 250-50 MCG/ACT         inhaler           (Z13.6) CARDIOVASCULAR SCREENING; LDL GOAL LESS THAN 160  Comment: No prior hx of hyperlipidemia. Advised to continue with recommended diet and exercise.     Plan: Lipid panel reflex to direct LDL Fasting, CBC         with platelets, Basic metabolic panel  (Ca, Cl,        CO2, Creat, " "Gluc, K, Na, BUN)                COUNSELING:  Reviewed preventive health counseling, as reflected in patient instructions       Regular exercise       Healthy diet/nutrition      BMI:   Estimated body mass index is 27.62 kg/m  as calculated from the following:    Height as of this encounter: 1.651 m (5' 5\").    Weight as of this encounter: 75.3 kg (166 lb).   Weight management plan: Discussed healthy diet and exercise guidelines      She reports that she has never smoked. She has never used smokeless tobacco.      PEARL [facility]  "

## 2022-12-13 NOTE — RESULT ENCOUNTER NOTE
Cindi-  Here are your recent results.     Your labs look normal with the exception of slighly elevated LDL cholesterol and very minimally elevated creatinine  The creatinine is unlikely to be clinically significant.  Eating healthy and getting regular exercise can help to control cholesterol. We can recheck labs in 1 year    Sherif Bull PA-C

## 2022-12-29 ENCOUNTER — APPOINTMENT (OUTPATIENT)
Dept: URBAN - METROPOLITAN AREA CLINIC 257 | Age: 32
Setting detail: DERMATOLOGY
End: 2022-12-29

## 2022-12-29 DIAGNOSIS — Z71.89 OTHER SPECIFIED COUNSELING: ICD-10-CM

## 2022-12-29 DIAGNOSIS — D22 MELANOCYTIC NEVI: ICD-10-CM

## 2022-12-29 DIAGNOSIS — D18.0 HEMANGIOMA: ICD-10-CM

## 2022-12-29 DIAGNOSIS — L57.8 OTHER SKIN CHANGES DUE TO CHRONIC EXPOSURE TO NONIONIZING RADIATION: ICD-10-CM

## 2022-12-29 PROBLEM — D22.5 MELANOCYTIC NEVI OF TRUNK: Status: ACTIVE | Noted: 2022-12-29

## 2022-12-29 PROBLEM — D18.01 HEMANGIOMA OF SKIN AND SUBCUTANEOUS TISSUE: Status: ACTIVE | Noted: 2022-12-29

## 2022-12-29 PROCEDURE — 99213 OFFICE O/P EST LOW 20 MIN: CPT

## 2022-12-29 PROCEDURE — OTHER COUNSELING: OTHER

## 2022-12-29 PROCEDURE — OTHER MIPS QUALITY: OTHER

## 2022-12-29 ASSESSMENT — LOCATION SIMPLE DESCRIPTION DERM
LOCATION SIMPLE: LEFT UPPER BACK
LOCATION SIMPLE: UPPER BACK
LOCATION SIMPLE: LEFT CHEEK

## 2022-12-29 ASSESSMENT — LOCATION DETAILED DESCRIPTION DERM
LOCATION DETAILED: LEFT INFERIOR MEDIAL UPPER BACK
LOCATION DETAILED: INFERIOR THORACIC SPINE
LOCATION DETAILED: LEFT MEDIAL MALAR CHEEK
LOCATION DETAILED: LEFT MEDIAL UPPER BACK

## 2022-12-29 ASSESSMENT — LOCATION ZONE DERM
LOCATION ZONE: TRUNK
LOCATION ZONE: FACE

## 2022-12-29 NOTE — PROCEDURE: COUNSELING
Detail Level: Generalized
Patient Specific Counseling (Will Not Stick From Patient To Patient): Rtn for hyfrecation of these

## 2023-07-12 ENCOUNTER — APPOINTMENT (OUTPATIENT)
Dept: URBAN - METROPOLITAN AREA CLINIC 257 | Age: 33
Setting detail: DERMATOLOGY
End: 2023-07-13

## 2023-07-12 DIAGNOSIS — D18.0 HEMANGIOMA: ICD-10-CM

## 2023-07-12 DIAGNOSIS — L21.8 OTHER SEBORRHEIC DERMATITIS: ICD-10-CM

## 2023-07-12 PROBLEM — D18.01 HEMANGIOMA OF SKIN AND SUBCUTANEOUS TISSUE: Status: ACTIVE | Noted: 2023-07-12

## 2023-07-12 PROCEDURE — 17110 DESTRUCT B9 LESION 1-14: CPT

## 2023-07-12 PROCEDURE — OTHER COUNSELING: OTHER

## 2023-07-12 PROCEDURE — OTHER PRESCRIPTION: OTHER

## 2023-07-12 PROCEDURE — OTHER BENIGN DESTRUCTION: OTHER

## 2023-07-12 PROCEDURE — 99213 OFFICE O/P EST LOW 20 MIN: CPT | Mod: 25

## 2023-07-12 RX ORDER — KETOCONAZOLE 20 MG/ML
SHAMPOO, SUSPENSION TOPICAL BIW
Qty: 120 | Refills: 2 | Status: ERX | COMMUNITY
Start: 2023-07-12

## 2023-07-12 ASSESSMENT — LOCATION SIMPLE DESCRIPTION DERM
LOCATION SIMPLE: RIGHT EYEBROW
LOCATION SIMPLE: HAIR

## 2023-07-12 ASSESSMENT — LOCATION ZONE DERM
LOCATION ZONE: SCALP
LOCATION ZONE: FACE

## 2023-07-12 ASSESSMENT — LOCATION DETAILED DESCRIPTION DERM
LOCATION DETAILED: RIGHT CENTRAL EYEBROW
LOCATION DETAILED: HAIR

## 2023-07-12 NOTE — PROCEDURE: BENIGN DESTRUCTION
Detail Level: Detailed
Render Post-Care Instructions In Note?: no
Treatment Number (Will Not Render If 0): 1
Medical Necessity Information: It is in your best interest to select a reason for this procedure from the list below. All of these items fulfill various CMS LCD requirements except the new and changing color options.
Post-Care Instructions: I reviewed with the patient in detail post-care instructions. Patient is to wear sunprotection, and avoid picking at any of the treated lesions. Pt may apply Vaseline to crusted or scabbing areas.
Medical Necessity Clause: This procedure was medically necessary because the lesions that were treated were:
Consent: The patient's consent was obtained including but not limited to risks of crusting, scabbing, blistering, scarring, darker or lighter pigmentary change, recurrence, incomplete removal and infection.

## 2023-07-14 ENCOUNTER — LAB REQUISITION (OUTPATIENT)
Dept: LAB | Facility: CLINIC | Age: 33
End: 2023-07-14

## 2023-07-14 DIAGNOSIS — N93.9 ABNORMAL UTERINE AND VAGINAL BLEEDING, UNSPECIFIED: ICD-10-CM

## 2023-07-14 LAB
ALBUMIN SERPL BCG-MCNC: 4.8 G/DL (ref 3.5–5.2)
ALP SERPL-CCNC: 58 U/L (ref 35–104)
ALT SERPL W P-5'-P-CCNC: 11 U/L (ref 0–50)
ANION GAP SERPL CALCULATED.3IONS-SCNC: 12 MMOL/L (ref 7–15)
AST SERPL W P-5'-P-CCNC: 26 U/L (ref 0–45)
BASOPHILS # BLD AUTO: 0.1 10E3/UL (ref 0–0.2)
BASOPHILS NFR BLD AUTO: 1 %
BILIRUB SERPL-MCNC: 0.4 MG/DL
BUN SERPL-MCNC: 16.7 MG/DL (ref 6–20)
CALCIUM SERPL-MCNC: 9.6 MG/DL (ref 8.6–10)
CHLORIDE SERPL-SCNC: 104 MMOL/L (ref 98–107)
CREAT SERPL-MCNC: 0.9 MG/DL (ref 0.51–0.95)
DEPRECATED HCO3 PLAS-SCNC: 24 MMOL/L (ref 22–29)
EOSINOPHIL # BLD AUTO: 0.4 10E3/UL (ref 0–0.7)
EOSINOPHIL NFR BLD AUTO: 6 %
ERYTHROCYTE [DISTWIDTH] IN BLOOD BY AUTOMATED COUNT: 11.9 % (ref 10–15)
GFR SERPL CREATININE-BSD FRML MDRD: 87 ML/MIN/1.73M2
GLUCOSE SERPL-MCNC: 90 MG/DL (ref 70–99)
HCG INTACT+B SERPL-ACNC: <1 MIU/ML
HCT VFR BLD AUTO: 44.3 % (ref 35–47)
HGB BLD-MCNC: 14.6 G/DL (ref 11.7–15.7)
IMM GRANULOCYTES # BLD: 0 10E3/UL
IMM GRANULOCYTES NFR BLD: 0 %
LYMPHOCYTES # BLD AUTO: 2.5 10E3/UL (ref 0.8–5.3)
LYMPHOCYTES NFR BLD AUTO: 37 %
MCH RBC QN AUTO: 30.9 PG (ref 26.5–33)
MCHC RBC AUTO-ENTMCNC: 33 G/DL (ref 31.5–36.5)
MCV RBC AUTO: 94 FL (ref 78–100)
MONOCYTES # BLD AUTO: 0.5 10E3/UL (ref 0–1.3)
MONOCYTES NFR BLD AUTO: 8 %
NEUTROPHILS # BLD AUTO: 3.2 10E3/UL (ref 1.6–8.3)
NEUTROPHILS NFR BLD AUTO: 48 %
NRBC # BLD AUTO: 0 10E3/UL
NRBC BLD AUTO-RTO: 0 /100
PLATELET # BLD AUTO: 349 10E3/UL (ref 150–450)
POTASSIUM SERPL-SCNC: 4.1 MMOL/L (ref 3.4–5.3)
PROLACTIN SERPL 3RD IS-MCNC: 38 NG/ML (ref 5–23)
PROT SERPL-MCNC: 7.7 G/DL (ref 6.4–8.3)
RBC # BLD AUTO: 4.73 10E6/UL (ref 3.8–5.2)
SODIUM SERPL-SCNC: 140 MMOL/L (ref 136–145)
TSH SERPL DL<=0.005 MIU/L-ACNC: 2.19 UIU/ML (ref 0.3–4.2)
WBC # BLD AUTO: 6.7 10E3/UL (ref 4–11)

## 2023-07-14 PROCEDURE — 84270 ASSAY OF SEX HORMONE GLOBUL: CPT | Performed by: OBSTETRICS & GYNECOLOGY

## 2023-07-14 PROCEDURE — 85025 COMPLETE CBC W/AUTO DIFF WBC: CPT | Performed by: OBSTETRICS & GYNECOLOGY

## 2023-07-14 PROCEDURE — 84702 CHORIONIC GONADOTROPIN TEST: CPT | Performed by: OBSTETRICS & GYNECOLOGY

## 2023-07-14 PROCEDURE — 80053 COMPREHEN METABOLIC PANEL: CPT | Performed by: OBSTETRICS & GYNECOLOGY

## 2023-07-14 PROCEDURE — 84146 ASSAY OF PROLACTIN: CPT | Performed by: OBSTETRICS & GYNECOLOGY

## 2023-07-14 PROCEDURE — 84403 ASSAY OF TOTAL TESTOSTERONE: CPT | Performed by: OBSTETRICS & GYNECOLOGY

## 2023-07-14 PROCEDURE — 84443 ASSAY THYROID STIM HORMONE: CPT | Performed by: OBSTETRICS & GYNECOLOGY

## 2023-07-14 PROCEDURE — 86376 MICROSOMAL ANTIBODY EACH: CPT | Performed by: OBSTETRICS & GYNECOLOGY

## 2023-07-15 LAB — SHBG SERPL-SCNC: 39 NMOL/L (ref 30–135)

## 2023-07-17 LAB — THYROPEROXIDASE AB SERPL-ACNC: <10 IU/ML

## 2023-07-19 ENCOUNTER — LAB REQUISITION (OUTPATIENT)
Dept: LAB | Facility: CLINIC | Age: 33
End: 2023-07-19

## 2023-07-19 DIAGNOSIS — E22.1 HYPERPROLACTINEMIA (H): ICD-10-CM

## 2023-07-19 LAB — PROLACTIN SERPL 3RD IS-MCNC: 8 NG/ML (ref 5–23)

## 2023-07-19 PROCEDURE — 84146 ASSAY OF PROLACTIN: CPT | Performed by: OBSTETRICS & GYNECOLOGY

## 2023-07-20 LAB
TESTOST FREE SERPL-MCNC: 0.24 NG/DL
TESTOST SERPL-MCNC: 15 NG/DL (ref 8–60)

## 2023-12-11 ENCOUNTER — OFFICE VISIT (OUTPATIENT)
Dept: FAMILY MEDICINE | Facility: CLINIC | Age: 33
End: 2023-12-11
Payer: COMMERCIAL

## 2023-12-11 VITALS
HEART RATE: 73 BPM | WEIGHT: 147 LBS | DIASTOLIC BLOOD PRESSURE: 76 MMHG | OXYGEN SATURATION: 96 % | BODY MASS INDEX: 23.63 KG/M2 | HEIGHT: 66 IN | TEMPERATURE: 98 F | SYSTOLIC BLOOD PRESSURE: 114 MMHG

## 2023-12-11 DIAGNOSIS — J45.40 MODERATE PERSISTENT ASTHMA WITHOUT COMPLICATION: ICD-10-CM

## 2023-12-11 DIAGNOSIS — Z00.00 ROUTINE GENERAL MEDICAL EXAMINATION AT A HEALTH CARE FACILITY: Primary | ICD-10-CM

## 2023-12-11 LAB
CHOLEST SERPL-MCNC: 146 MG/DL
FASTING STATUS PATIENT QL REPORTED: YES
HDLC SERPL-MCNC: 46 MG/DL
LDLC SERPL CALC-MCNC: 82 MG/DL
NONHDLC SERPL-MCNC: 100 MG/DL
TRIGL SERPL-MCNC: 92 MG/DL

## 2023-12-11 PROCEDURE — 80061 LIPID PANEL: CPT | Performed by: PHYSICIAN ASSISTANT

## 2023-12-11 PROCEDURE — 36415 COLL VENOUS BLD VENIPUNCTURE: CPT | Performed by: PHYSICIAN ASSISTANT

## 2023-12-11 PROCEDURE — 99395 PREV VISIT EST AGE 18-39: CPT | Performed by: PHYSICIAN ASSISTANT

## 2023-12-11 RX ORDER — ALBUTEROL SULFATE 90 UG/1
2 AEROSOL, METERED RESPIRATORY (INHALATION) EVERY 6 HOURS
Qty: 18 G | Refills: 1 | Status: SHIPPED | OUTPATIENT
Start: 2023-12-11

## 2023-12-11 RX ORDER — FLUTICASONE PROPIONATE AND SALMETEROL 250; 50 UG/1; UG/1
1 POWDER RESPIRATORY (INHALATION) EVERY 12 HOURS
Qty: 60 EACH | Refills: 3 | Status: SHIPPED | OUTPATIENT
Start: 2023-12-11

## 2023-12-11 ASSESSMENT — ENCOUNTER SYMPTOMS
HEARTBURN: 0
NERVOUS/ANXIOUS: 0
HEMATURIA: 0
WEAKNESS: 0
DIZZINESS: 0
HEMATOCHEZIA: 0
JOINT SWELLING: 0
DIARRHEA: 0
CONSTIPATION: 0
ABDOMINAL PAIN: 0
DYSURIA: 0
NAUSEA: 0
FREQUENCY: 0
PALPITATIONS: 0
PARESTHESIAS: 0
EYE PAIN: 0
ARTHRALGIAS: 0
BREAST MASS: 0
CHILLS: 0
SORE THROAT: 0
COUGH: 0
FEVER: 0
MYALGIAS: 0
SHORTNESS OF BREATH: 0
HEADACHES: 0

## 2023-12-11 ASSESSMENT — ASTHMA QUESTIONNAIRES: ACT_TOTALSCORE: 23

## 2023-12-11 ASSESSMENT — PAIN SCALES - GENERAL: PAINLEVEL: NO PAIN (0)

## 2023-12-11 NOTE — PROGRESS NOTES
SUBJECTIVE:   Cindi is a 33 year old, presenting for the following:  Physical        2023     7:03 AM   Additional Questions   Roomed by audi       Healthy Habits:     Getting at least 3 servings of Calcium per day:  Yes    Bi-annual eye exam:  Yes    Dental care twice a year:  NO    Sleep apnea or symptoms of sleep apnea:  None    Diet:  Regular (no restrictions)    Frequency of exercise:  4-5 days/week    Duration of exercise:  30-45 minutes    Taking medications regularly:  Yes    Medication side effects:  None    Additional concerns today:  No      Today's PHQ-2 Score:       2023     6:49 AM   PHQ-2 (  [employer])   Q1: Little interest or pleasure in doing things 0   Q2: Feeling down, depressed or hopeless 0   PHQ-2 Score 0   Q1: Little interest or pleasure in doing things Not at all   Q2: Feeling down, depressed or hopeless Not at all   PHQ-2 Score 0               Social History     Tobacco Use    Smoking status: Never    Smokeless tobacco: Never   Substance Use Topics    Alcohol use: Not Currently     Comment: not while pregnant           2023     6:48 AM   Alcohol Use   Prescreen: >3 drinks/day or >7 drinks/week? No     Reviewed orders with patient.  Reviewed health maintenance and updated orders accordingly - Yes  Patient Active Problem List   Diagnosis    Mild intermittent asthma    CARDIOVASCULAR SCREENING; LDL GOAL LESS THAN 160    Dairy product intolerance    Labor and delivery, indication for care    Normal labor and delivery    Family history of malignant neoplasm of ovary    Thrush, oral    Vaccine or biological substance causing adverse effect in therapeutic use, initial encounter    Adjustment disorder with mixed anxiety and depressed mood    Indication for care in labor or delivery     delivery delivered     Past Surgical History:   Procedure Laterality Date     SECTION N/A 2019    Procedure:  SECTION;  Surgeon: Herminio Phoenix MD;   Location: [facility]     SECTION N/A 2022    Procedure: REPEAT  SECTION;  Surgeon: Marely Fuller MD;  Location: [facility]    LASIK BILATERAL Bilateral 2016    wisdom teeth      ZZC REPAIR CRUCIATE LIGAMENT,KNEE Right     No current issues       Social History     Tobacco Use    Smoking status: Never    Smokeless tobacco: Never   Substance Use Topics    Alcohol use: Not Currently     Comment: not while pregnant     Family History   Problem Relation Age of Onset    Asthma Mother     Hypothyroidism Mother     Hypertension Father         well-controlled    Colon Polyps Father     Angina Maternal Grandfather         no h/o MI    Alzheimer Disease Paternal Grandmother         passed from kidney stone leading to sepsis    Colon Cancer Paternal Grandfather 80        did not pass from this    Pacemaker Paternal Grandfather     Heart Failure Paternal Grandfather     Colon Polyps Paternal Aunt     Breast Cancer No family hx of          Current Outpatient Medications   Medication Sig Dispense Refill    albuterol (PROAIR HFA/PROVENTIL HFA/VENTOLIN HFA) 108 (90 Base) MCG/ACT inhaler Inhale 2 puffs into the lungs every 6 hours 18 g 1    fluticasone-salmeterol (ADVAIR) 250-50 MCG/ACT inhaler Inhale 1 puff into the lungs every 12 hours 60 each 3    fluticasone-salmeterol (WIXELA INHUB) 250-50 MCG/DOSE inhaler Inhale 1 puff into the lungs 2 times daily 60 each 4    Prenatal Multivit-Min-Fe-FA (PRE-SHERON PO) Take 1 tablet by mouth daily      sertraline (ZOLOFT) 25 MG tablet Take 1 tablet (25 mg) by mouth daily      ibuprofen (ADVIL/MOTRIN) 800 MG tablet Take 1 tablet (800 mg) by mouth every 6 hours as needed for moderate pain 30 tablet 0     No Known Allergies    Breast Cancer Screening:    FHS-7:       2022    11:38 AM 2023     6:51 AM   Breast CA Risk Assessment (FHS-7)   Did any of your first-degree relatives have breast or ovarian cancer? Yes Yes   Did any of your relatives have bilateral breast  cancer? No No   Did any man in your family have breast cancer? No No   Did any woman in your family have breast and ovarian cancer? Yes Yes   Did any woman in your family have breast cancer before age 50 y? No No   Do you have 2 or more relatives with breast and/or ovarian cancer? No No   Do you have 2 or more relatives with breast and/or bowel cancer? No No     click delete button to remove this line now  Patient under 40 years of age: Routine Mammogram Screening not recommended.   Pertinent mammograms are reviewed under the imaging tab.    History of abnormal Pap smear: NO - age 30-65 PAP every 5 years with negative HPV co-testing recommended      Latest Ref Rng & Units 11/15/2022    11:12 AM   PAP / HPV   PAP  Negative for Intraepithelial Lesion or Malignancy (NILM)    HPV 16 DNA Negative Negative    HPV 18 DNA Negative Negative    Other HR HPV Negative Negative      Reviewed and updated as needed this visit by clinical staff   Tobacco  Allergies  Meds              Reviewed and updated as needed this visit by Provider                 Past Medical History:   Diagnosis Date    Asthma     Lifelong, currently stable.  No inhaler use for 3months, and infrequent use during preg (19)    NO ACTIVE PROBLEMS       Past Surgical History:   Procedure Laterality Date     SECTION N/A 2019    Procedure:  SECTION;  Surgeon: Herminio Phoenix MD;  Location:  L+D     SECTION N/A 2022    Procedure: REPEAT  SECTION;  Surgeon: Marely Fuller MD;  Location:  L+D    LASIK BILATERAL Bilateral 2016    wisdom teeth      ZZC REPAIR CRUCIATE LIGAMENT,KNEE Right     No current issues       Review of Systems   Constitutional:  Negative for chills and fever.   HENT:  Negative for congestion, ear pain, hearing loss and sore throat.    Eyes:  Negative for pain and visual disturbance.   Respiratory:  Negative for cough and shortness of breath.    Cardiovascular:  Negative for chest  "pain, palpitations and peripheral edema.   Gastrointestinal:  Negative for abdominal pain, constipation, diarrhea, heartburn, hematochezia and nausea.   Breasts:  Negative for tenderness, breast mass and discharge.   Genitourinary:  Negative for dysuria, frequency, genital sores, hematuria, pelvic pain, urgency, vaginal bleeding and vaginal discharge.   Musculoskeletal:  Negative for arthralgias, joint swelling and myalgias.   Skin:  Negative for rash.   Neurological:  Negative for dizziness, weakness, headaches and paresthesias.   Psychiatric/Behavioral:  Negative for mood changes. The patient is not nervous/anxious.        OBJECTIVE:   /76   Pulse 73   Temp 98  F (36.7  C) (Tympanic)   Ht 1.676 m (5' 6\")   Wt 66.7 kg (147 lb)   LMP 11/15/2023 (Approximate)   SpO2 96%   BMI 23.73 kg/m    Physical Exam  GENERAL: healthy, alert and no distress  EYES: Eyes grossly normal to inspection, PERRL and conjunctivae and sclerae normal  HENT: ear canals and TM's normal, nose and mouth without ulcers or lesions  NECK: no adenopathy, no asymmetry, masses, or scars and thyroid normal to palpation  RESP: lungs clear to auscultation - no rales, rhonchi or wheezes  CV: regular rate and rhythm, normal S1 S2, no S3 or S4, no murmur, click or rub, no peripheral edema and peripheral pulses strong  ABDOMEN: soft, nontender, no hepatosplenomegaly, no masses and bowel sounds normal  MS: no gross musculoskeletal defects noted, no edema  SKIN: no suspicious lesions or rashes  NEURO: Normal strength and tone, mentation intact and speech normal  PSYCH: mentation appears normal, affect normal/bright        ASSESSMENT/PLAN:       1. Routine general medical examination at a health care facility    - Lipid Profile (Chol, Trig, HDL, LDL calc); Future  - Lipid Profile (Chol, Trig, HDL, LDL calc)    2. Moderate persistent asthma without complication  stable  - albuterol (PROAIR HFA/PROVENTIL HFA/VENTOLIN HFA) 108 (90 Base) MCG/ACT " inhaler; Inhale 2 puffs into the lungs every 6 hours  Dispense: 18 g; Refill: 1          COUNSELING:  Reviewed preventive health counseling, as reflected in patient instructions       Regular exercise       Healthy diet/nutrition        She reports that she has never smoked. She has never used smokeless tobacco.      Sherif Bull PA-C  [facility]

## 2023-12-13 NOTE — RESULT ENCOUNTER NOTE
Cindi-  Here are your recent results.     Your cholesterol looks good!  Your HDL is slightly low but your LDL is within your goal.     We can recheck this next year    Sherif Bull PA-C

## 2023-12-21 ENCOUNTER — LAB REQUISITION (OUTPATIENT)
Dept: LAB | Facility: CLINIC | Age: 33
End: 2023-12-21

## 2023-12-21 DIAGNOSIS — Z80.41 FAMILY HISTORY OF MALIGNANT NEOPLASM OF OVARY: ICD-10-CM

## 2023-12-21 PROCEDURE — 86304 IMMUNOASSAY TUMOR CA 125: CPT | Performed by: OBSTETRICS & GYNECOLOGY

## 2023-12-22 LAB — CANCER AG125 SERPL-ACNC: 13 U/ML

## 2024-10-07 SDOH — HEALTH STABILITY: PHYSICAL HEALTH: ON AVERAGE, HOW MANY MINUTES DO YOU ENGAGE IN EXERCISE AT THIS LEVEL?: 20 MIN

## 2024-10-07 SDOH — HEALTH STABILITY: PHYSICAL HEALTH: ON AVERAGE, HOW MANY DAYS PER WEEK DO YOU ENGAGE IN MODERATE TO STRENUOUS EXERCISE (LIKE A BRISK WALK)?: 5 DAYS

## 2024-10-07 ASSESSMENT — ASTHMA QUESTIONNAIRES
QUESTION_2 LAST FOUR WEEKS HOW OFTEN HAVE YOU HAD SHORTNESS OF BREATH: ONCE OR TWICE A WEEK
QUESTION_4 LAST FOUR WEEKS HOW OFTEN HAVE YOU USED YOUR RESCUE INHALER OR NEBULIZER MEDICATION (SUCH AS ALBUTEROL): NOT AT ALL
QUESTION_1 LAST FOUR WEEKS HOW MUCH OF THE TIME DID YOUR ASTHMA KEEP YOU FROM GETTING AS MUCH DONE AT WORK, SCHOOL OR AT HOME: NONE OF THE TIME
QUESTION_3 LAST FOUR WEEKS HOW OFTEN DID YOUR ASTHMA SYMPTOMS (WHEEZING, COUGHING, SHORTNESS OF BREATH, CHEST TIGHTNESS OR PAIN) WAKE YOU UP AT NIGHT OR EARLIER THAN USUAL IN THE MORNING: NOT AT ALL
QUESTION_5 LAST FOUR WEEKS HOW WOULD YOU RATE YOUR ASTHMA CONTROL: WELL CONTROLLED
ACT_TOTALSCORE: 23
ACT_TOTALSCORE: 23

## 2024-10-07 ASSESSMENT — SOCIAL DETERMINANTS OF HEALTH (SDOH): HOW OFTEN DO YOU GET TOGETHER WITH FRIENDS OR RELATIVES?: ONCE A WEEK

## 2024-10-08 ENCOUNTER — OFFICE VISIT (OUTPATIENT)
Dept: FAMILY MEDICINE | Facility: CLINIC | Age: 34
End: 2024-10-08
Payer: COMMERCIAL

## 2024-10-08 VITALS
DIASTOLIC BLOOD PRESSURE: 66 MMHG | BODY MASS INDEX: 25.78 KG/M2 | TEMPERATURE: 98.4 F | SYSTOLIC BLOOD PRESSURE: 100 MMHG | RESPIRATION RATE: 20 BRPM | OXYGEN SATURATION: 99 % | HEART RATE: 81 BPM | WEIGHT: 159.7 LBS

## 2024-10-08 DIAGNOSIS — F43.23 ADJUSTMENT DISORDER WITH MIXED ANXIETY AND DEPRESSED MOOD: ICD-10-CM

## 2024-10-08 DIAGNOSIS — Z00.00 ROUTINE GENERAL MEDICAL EXAMINATION AT A HEALTH CARE FACILITY: Primary | ICD-10-CM

## 2024-10-08 DIAGNOSIS — J45.20 MILD INTERMITTENT ASTHMA WITHOUT COMPLICATION: ICD-10-CM

## 2024-10-08 DIAGNOSIS — Z13.6 CARDIOVASCULAR SCREENING; LDL GOAL LESS THAN 160: ICD-10-CM

## 2024-10-08 PROCEDURE — 99395 PREV VISIT EST AGE 18-39: CPT | Performed by: PHYSICIAN ASSISTANT

## 2024-10-08 RX ORDER — ALBUTEROL SULFATE 90 UG/1
2 INHALANT RESPIRATORY (INHALATION) EVERY 6 HOURS
Qty: 18 G | Refills: 1 | Status: SHIPPED | OUTPATIENT
Start: 2024-10-08

## 2024-10-08 RX ORDER — FLUTICASONE PROPIONATE AND SALMETEROL 250; 50 UG/1; UG/1
1 POWDER RESPIRATORY (INHALATION) EVERY 12 HOURS
Qty: 60 EACH | Refills: 3 | Status: SHIPPED | OUTPATIENT
Start: 2024-10-08

## 2024-10-08 ASSESSMENT — PAIN SCALES - GENERAL: PAINLEVEL: NO PAIN (0)

## 2024-10-08 NOTE — PROGRESS NOTES
Preventive Care Visit  [facility] DALJIT Bull PA-C, Family Medicine  Oct 8, 2024      Assessment & Plan     Routine general medical examination at a health care facility      Adjustment disorder with mixed anxiety and depressed mood  Currently controlled without Zoloft.  She uses this medication during stressful life events for several months at a time.  Will keep on medication list    Mild intermittent asthma without complication  controlled  - fluticasone-salmeterol (ADVAIR) 250-50 MCG/ACT inhaler; Inhale 1 puff into the lungs every 12 hours.    CARDIOVASCULAR SCREENING; LDL GOAL LESS THAN 160  Not due for labs today, will consider next year            Counseling  Appropriate preventive services were addressed with this patient via screening, questionnaire, or discussion as appropriate for fall prevention, nutrition, physical activity, Tobacco-use cessation, social engagement, weight loss and cognition.  Checklist reviewing preventive services available has been given to the patient.  Reviewed patient's diet, addressing concerns and/or questions.   She is at risk for psychosocial distress and has been provided with information to reduce risk.       Follow up annually    Sally Puente is a 33 year old, presenting for the following:  Physical        10/8/2024     6:53 AM   Additional Questions   Roomed by Rosa Maria ZEE        Health Care Directive  Patient does not have a Health Care Directive or Living Will: Discussed advance care planning with patient; however, patient declined at this time.    Healthy Habits:     Getting at least 3 servings of Calcium per day:  Yes    Bi-annual eye exam:  Yes    Dental care twice a year:  Yes    Sleep apnea or symptoms of sleep apnea:  None    Diet:  Regular (no restrictions)    Frequency of exercise:  4-5 days/week    Duration of exercise:  15-30 minutes    Taking medications regularly:  Yes    Barriers to taking medications:  None     Medication side effects:  None    Additional concerns today:  No        10/7/2024   General Health   How would you rate your overall physical health? Good   Feel stress (tense, anxious, or unable to sleep) Only a little      (!) STRESS CONCERN      10/7/2024   Nutrition   Three or more servings of calcium each day? Yes   Diet: Regular (no restrictions)   How many servings of fruit and vegetables per day? (!) 2-3   How many sweetened beverages each day? 0-1            10/7/2024   Exercise   Days per week of moderate/strenous exercise 5 days   Average minutes spent exercising at this level 20 min            10/7/2024   Social Factors   Frequency of gathering with friends or relatives Once a week   Worry food won't last until get money to buy more No   Food not last or not have enough money for food? No   Do you have housing? (Housing is defined as stable permanent housing and does not include staying ouside in a car, in a tent, in an abandoned building, in an overnight shelter, or couch-surfing.) Yes   Are you worried about losing your housing? No   Lack of transportation? No   Unable to get utilities (heat,electricity)? No            10/7/2024   Dental   Dentist two times every year? Yes               Today's PHQ-2 Score:       10/7/2024     9:00 AM   PHQ-2 ( 1999 [employer])   Q1: Little interest or pleasure in doing things 0   Q2: Feeling down, depressed or hopeless 0   PHQ-2 Score 0   Q1: Little interest or pleasure in doing things Not at all   Q2: Feeling down, depressed or hopeless Not at all   PHQ-2 Score 0           10/7/2024   Substance Use   Alcohol more than 3/day or more than 7/wk No   Do you use any other substances recreationally? No        Social History     Tobacco Use    Smoking status: Never    Smokeless tobacco: Never   Substance Use Topics    Alcohol use: Not Currently     Comment: not while pregnant    Drug use: Never         12/11/2023   LAST FHS-7 RESULTS   1st degree relative breast or ovarian  cancer Yes   Any relative bilateral breast cancer No   Any male have breast cancer No   Any ONE woman have BOTH breast AND ovarian cancer Yes   Any woman with breast cancer before 50yrs No   2 or more relatives with breast AND/OR ovarian cancer No   2 or more relatives with breast AND/OR bowel cancer No          Mammogram Screening - Patient under 40 years of age: Routine Mammogram Screening not recommended.         10/7/2024   STI Screening   New sexual partner(s) since last STI/HIV test? No        History of abnormal Pap smear: No - age 30-64 HPV with reflex Pap every 5 years recommended        Latest Ref Rng & Units 11/15/2022    11:12 AM   PAP / HPV   PAP  Negative for Intraepithelial Lesion or Malignancy (NILM)    HPV 16 DNA Negative Negative    HPV 18 DNA Negative Negative    Other HR HPV Negative Negative            10/7/2024   Contraception/Family Planning   Questions about contraception or family planning No     Reviewed and updated as needed this visit by Provider   Tobacco   Meds  Problems  Med Hx  Surg Hx  Fam Hx            Past Medical History:   Diagnosis Date    Asthma     Lifelong, currently stable.  No inhaler use for 3months, and infrequent use during preg (20)    NO ACTIVE PROBLEMS      Past Surgical History:   Procedure Laterality Date     SECTION N/A 2019    Procedure:  SECTION;  Surgeon: Herminio Phoenix MD;  Location:  L+D     SECTION N/A 2022    Procedure: REPEAT  SECTION;  Surgeon: Marely Fuller MD;  Location:  L+D    LASIK BILATERAL Bilateral 2016    wisdom teeth      ZZC REPAIR CRUCIATE LIGAMENT,KNEE Right     No current issues     OB History    Para Term  AB Living   2 2 2 0 0 2   SAB IAB Ectopic Multiple Live Births   0 0 0 0 2      # Outcome Date GA Lbr Aime/2nd Weight Sex Type Anes PTL Lv   2 Term 22 39w3d  3.67 kg (8 lb 1.5 oz) M  Spinal  AMELIA      Name: AMAYA,LISA-CHRIS      Apgar1: 9  Apgar5: 9   1  "Term 08/27/19 41w5d 02:20 / 03:33 3.81 kg (8 lb 6.4 oz) M CS-LTranv EPI N AMELIA      Complications: Fetal Intolerance, Intraamniotic Infection, Failure to Progress in Second Stage, Cephalopelvic Disproportion      Name: ABELARDO CONDE      Apgar1: 8  Apgar5: 9         Review of Systems  Constitutional, HEENT, cardiovascular, pulmonary, GI, , musculoskeletal, neuro, skin, endocrine and psych systems are negative, except as otherwise noted.     Objective    Exam  /66   Pulse 81   Temp 98.4  F (36.9  C)   Resp 20   Wt 72.4 kg (159 lb 11.2 oz)   LMP 09/11/2024 (Approximate)   SpO2 99%   BMI 25.78 kg/m     Estimated body mass index is 25.78 kg/m  as calculated from the following:    Height as of 12/11/23: 1.676 m (5' 6\").    Weight as of this encounter: 72.4 kg (159 lb 11.2 oz).    Physical Exam  GENERAL: alert and no distress  EYES: Eyes grossly normal to inspection, PERRL and conjunctivae and sclerae normal  HENT: ear canals and TM's normal, nose and mouth without ulcers or lesions  NECK: no adenopathy, no asymmetry, masses, or scars  RESP: lungs clear to auscultation - no rales, rhonchi or wheezes  CV: regular rate and rhythm, normal S1 S2, no S3 or S4, no murmur, click or rub, no peripheral edema   ABDOMEN: soft, nontender, no hepatosplenomegaly, no masses and bowel sounds normal  PSYCH: mentation appears normal, affect normal/bright        Signed Electronically by: Sherif Bull PA-C    "

## 2024-12-31 ENCOUNTER — APPOINTMENT (OUTPATIENT)
Dept: CT IMAGING | Facility: CLINIC | Age: 34
DRG: 872 | End: 2024-12-31
Attending: EMERGENCY MEDICINE
Payer: COMMERCIAL

## 2024-12-31 ENCOUNTER — HOSPITAL ENCOUNTER (INPATIENT)
Facility: CLINIC | Age: 34
DRG: 872 | End: 2024-12-31
Attending: EMERGENCY MEDICINE
Payer: COMMERCIAL

## 2024-12-31 DIAGNOSIS — L03.115 CELLULITIS OF RIGHT LEG: ICD-10-CM

## 2024-12-31 DIAGNOSIS — N17.9 AKI (ACUTE KIDNEY INJURY): ICD-10-CM

## 2024-12-31 LAB
ALBUMIN SERPL BCG-MCNC: 3.9 G/DL (ref 3.5–5.2)
ALP SERPL-CCNC: 67 U/L (ref 40–150)
ALT SERPL W P-5'-P-CCNC: 11 U/L (ref 0–50)
ANION GAP SERPL CALCULATED.3IONS-SCNC: 12 MMOL/L (ref 7–15)
AST SERPL W P-5'-P-CCNC: 17 U/L (ref 0–45)
BASOPHILS # BLD AUTO: 0.1 10E3/UL (ref 0–0.2)
BASOPHILS NFR BLD AUTO: 0 %
BILIRUB SERPL-MCNC: 0.5 MG/DL
BUN SERPL-MCNC: 15.2 MG/DL (ref 6–20)
CALCIUM SERPL-MCNC: 9.2 MG/DL (ref 8.8–10.4)
CHLORIDE SERPL-SCNC: 100 MMOL/L (ref 98–107)
CK SERPL-CCNC: 55 U/L (ref 26–192)
CREAT SERPL-MCNC: 1.15 MG/DL (ref 0.51–0.95)
EGFRCR SERPLBLD CKD-EPI 2021: 64 ML/MIN/1.73M2
EOSINOPHIL # BLD AUTO: 0 10E3/UL (ref 0–0.7)
EOSINOPHIL NFR BLD AUTO: 0 %
ERYTHROCYTE [DISTWIDTH] IN BLOOD BY AUTOMATED COUNT: 12.4 % (ref 10–15)
GLUCOSE SERPL-MCNC: 113 MG/DL (ref 70–99)
HCG INTACT+B SERPL-ACNC: <1 MIU/ML
HCO3 SERPL-SCNC: 23 MMOL/L (ref 22–29)
HCT VFR BLD AUTO: 37.8 % (ref 35–47)
HGB BLD-MCNC: 13.1 G/DL (ref 11.7–15.7)
HOLD SPECIMEN: 0
IMM GRANULOCYTES # BLD: 0.2 10E3/UL
IMM GRANULOCYTES NFR BLD: 1 %
LACTATE SERPL-SCNC: 1.6 MMOL/L (ref 0.7–2)
LYMPHOCYTES # BLD AUTO: 0.4 10E3/UL (ref 0.8–5.3)
LYMPHOCYTES NFR BLD AUTO: 2 %
MCH RBC QN AUTO: 30.3 PG (ref 26.5–33)
MCHC RBC AUTO-ENTMCNC: 34.7 G/DL (ref 31.5–36.5)
MCV RBC AUTO: 87 FL (ref 78–100)
MONOCYTES # BLD AUTO: 0.7 10E3/UL (ref 0–1.3)
MONOCYTES NFR BLD AUTO: 3 %
NEUTROPHILS # BLD AUTO: 19.8 10E3/UL (ref 1.6–8.3)
NEUTROPHILS NFR BLD AUTO: 94 %
NRBC # BLD AUTO: 0 10E3/UL
NRBC BLD AUTO-RTO: 0 /100
PLATELET # BLD AUTO: 230 10E3/UL (ref 150–450)
POTASSIUM SERPL-SCNC: 4 MMOL/L (ref 3.4–5.3)
PROT SERPL-MCNC: 7.4 G/DL (ref 6.4–8.3)
RBC # BLD AUTO: 4.33 10E6/UL (ref 3.8–5.2)
SODIUM SERPL-SCNC: 135 MMOL/L (ref 135–145)
WBC # BLD AUTO: 21 10E3/UL (ref 4–11)

## 2024-12-31 PROCEDURE — 250N000011 HC RX IP 250 OP 636

## 2024-12-31 PROCEDURE — 120N000001 HC R&B MED SURG/OB

## 2024-12-31 PROCEDURE — 87040 BLOOD CULTURE FOR BACTERIA: CPT | Performed by: EMERGENCY MEDICINE

## 2024-12-31 PROCEDURE — 99207 PR APP CREDIT; MD BILLING SHARED VISIT: CPT | Mod: FS

## 2024-12-31 PROCEDURE — 258N000003 HC RX IP 258 OP 636

## 2024-12-31 PROCEDURE — 82947 ASSAY GLUCOSE BLOOD QUANT: CPT | Performed by: EMERGENCY MEDICINE

## 2024-12-31 PROCEDURE — 96367 TX/PROPH/DG ADDL SEQ IV INF: CPT | Mod: 59

## 2024-12-31 PROCEDURE — 80051 ELECTROLYTE PANEL: CPT | Performed by: EMERGENCY MEDICINE

## 2024-12-31 PROCEDURE — 82550 ASSAY OF CK (CPK): CPT

## 2024-12-31 PROCEDURE — 258N000003 HC RX IP 258 OP 636: Performed by: EMERGENCY MEDICINE

## 2024-12-31 PROCEDURE — 36415 COLL VENOUS BLD VENIPUNCTURE: CPT | Performed by: EMERGENCY MEDICINE

## 2024-12-31 PROCEDURE — 96365 THER/PROPH/DIAG IV INF INIT: CPT | Mod: 59

## 2024-12-31 PROCEDURE — 99223 1ST HOSP IP/OBS HIGH 75: CPT | Mod: FS | Performed by: INTERNAL MEDICINE

## 2024-12-31 PROCEDURE — 96376 TX/PRO/DX INJ SAME DRUG ADON: CPT | Mod: 59

## 2024-12-31 PROCEDURE — 84295 ASSAY OF SERUM SODIUM: CPT | Performed by: EMERGENCY MEDICINE

## 2024-12-31 PROCEDURE — 250N000013 HC RX MED GY IP 250 OP 250 PS 637

## 2024-12-31 PROCEDURE — 73701 CT LOWER EXTREMITY W/DYE: CPT | Mod: RT

## 2024-12-31 PROCEDURE — 250N000009 HC RX 250: Performed by: EMERGENCY MEDICINE

## 2024-12-31 PROCEDURE — 96366 THER/PROPH/DIAG IV INF ADDON: CPT | Mod: 59

## 2024-12-31 PROCEDURE — 250N000011 HC RX IP 250 OP 636: Performed by: EMERGENCY MEDICINE

## 2024-12-31 PROCEDURE — 99285 EMERGENCY DEPT VISIT HI MDM: CPT | Mod: 25

## 2024-12-31 PROCEDURE — 85025 COMPLETE CBC W/AUTO DIFF WBC: CPT | Performed by: EMERGENCY MEDICINE

## 2024-12-31 PROCEDURE — 96361 HYDRATE IV INFUSION ADD-ON: CPT | Mod: 59

## 2024-12-31 PROCEDURE — 84702 CHORIONIC GONADOTROPIN TEST: CPT

## 2024-12-31 PROCEDURE — 83605 ASSAY OF LACTIC ACID: CPT | Performed by: EMERGENCY MEDICINE

## 2024-12-31 PROCEDURE — 96375 TX/PRO/DX INJ NEW DRUG ADDON: CPT | Mod: 59

## 2024-12-31 PROCEDURE — 250N000013 HC RX MED GY IP 250 OP 250 PS 637: Performed by: EMERGENCY MEDICINE

## 2024-12-31 RX ORDER — AMOXICILLIN 250 MG
1 CAPSULE ORAL 2 TIMES DAILY PRN
Status: DISCONTINUED | OUTPATIENT
Start: 2024-12-31 | End: 2025-01-04 | Stop reason: HOSPADM

## 2024-12-31 RX ORDER — NALOXONE HYDROCHLORIDE 0.4 MG/ML
0.2 INJECTION, SOLUTION INTRAMUSCULAR; INTRAVENOUS; SUBCUTANEOUS
Status: DISCONTINUED | OUTPATIENT
Start: 2024-12-31 | End: 2025-01-04 | Stop reason: HOSPADM

## 2024-12-31 RX ORDER — HYDROMORPHONE HCL IN WATER/PF 6 MG/30 ML
0.2 PATIENT CONTROLLED ANALGESIA SYRINGE INTRAVENOUS
Status: DISCONTINUED | OUTPATIENT
Start: 2024-12-31 | End: 2025-01-02

## 2024-12-31 RX ORDER — PROCHLORPERAZINE MALEATE 10 MG
10 TABLET ORAL EVERY 6 HOURS PRN
Status: DISCONTINUED | OUTPATIENT
Start: 2024-12-31 | End: 2025-01-04 | Stop reason: HOSPADM

## 2024-12-31 RX ORDER — ACETAMINOPHEN 325 MG/1
975 TABLET ORAL ONCE
Status: COMPLETED | OUTPATIENT
Start: 2024-12-31 | End: 2024-12-31

## 2024-12-31 RX ORDER — NALOXONE HYDROCHLORIDE 0.4 MG/ML
0.4 INJECTION, SOLUTION INTRAMUSCULAR; INTRAVENOUS; SUBCUTANEOUS
Status: DISCONTINUED | OUTPATIENT
Start: 2024-12-31 | End: 2025-01-04 | Stop reason: HOSPADM

## 2024-12-31 RX ORDER — MORPHINE SULFATE 4 MG/ML
4 INJECTION, SOLUTION INTRAMUSCULAR; INTRAVENOUS ONCE
Status: COMPLETED | OUTPATIENT
Start: 2024-12-31 | End: 2024-12-31

## 2024-12-31 RX ORDER — LORATADINE 10 MG/1
10 TABLET ORAL DAILY PRN
COMMUNITY

## 2024-12-31 RX ORDER — ACETAMINOPHEN 325 MG/1
650 TABLET ORAL EVERY 4 HOURS PRN
Status: DISCONTINUED | OUTPATIENT
Start: 2024-12-31 | End: 2025-01-04 | Stop reason: HOSPADM

## 2024-12-31 RX ORDER — ONDANSETRON 4 MG/1
4 TABLET, ORALLY DISINTEGRATING ORAL EVERY 6 HOURS PRN
Status: DISCONTINUED | OUTPATIENT
Start: 2024-12-31 | End: 2025-01-04 | Stop reason: HOSPADM

## 2024-12-31 RX ORDER — HYDRALAZINE HYDROCHLORIDE 20 MG/ML
10 INJECTION INTRAMUSCULAR; INTRAVENOUS EVERY 4 HOURS PRN
Status: DISCONTINUED | OUTPATIENT
Start: 2024-12-31 | End: 2025-01-04 | Stop reason: HOSPADM

## 2024-12-31 RX ORDER — IOPAMIDOL 755 MG/ML
90 INJECTION, SOLUTION INTRAVASCULAR ONCE
Status: COMPLETED | OUTPATIENT
Start: 2024-12-31 | End: 2024-12-31

## 2024-12-31 RX ORDER — OXYCODONE HYDROCHLORIDE 5 MG/1
5 TABLET ORAL EVERY 4 HOURS PRN
Status: DISCONTINUED | OUTPATIENT
Start: 2024-12-31 | End: 2025-01-04 | Stop reason: HOSPADM

## 2024-12-31 RX ORDER — ACETAMINOPHEN 650 MG/1
650 SUPPOSITORY RECTAL EVERY 4 HOURS PRN
Status: DISCONTINUED | OUTPATIENT
Start: 2024-12-31 | End: 2025-01-04 | Stop reason: HOSPADM

## 2024-12-31 RX ORDER — HYDRALAZINE HYDROCHLORIDE 10 MG/1
10 TABLET, FILM COATED ORAL EVERY 4 HOURS PRN
Status: DISCONTINUED | OUTPATIENT
Start: 2024-12-31 | End: 2025-01-04 | Stop reason: HOSPADM

## 2024-12-31 RX ORDER — FLUTICASONE FUROATE AND VILANTEROL 100; 25 UG/1; UG/1
1 POWDER RESPIRATORY (INHALATION) DAILY
Status: DISCONTINUED | OUTPATIENT
Start: 2024-12-31 | End: 2025-01-04 | Stop reason: HOSPADM

## 2024-12-31 RX ORDER — SODIUM CHLORIDE, SODIUM LACTATE, POTASSIUM CHLORIDE, CALCIUM CHLORIDE 600; 310; 30; 20 MG/100ML; MG/100ML; MG/100ML; MG/100ML
INJECTION, SOLUTION INTRAVENOUS CONTINUOUS
Status: DISCONTINUED | OUTPATIENT
Start: 2024-12-31 | End: 2025-01-01

## 2024-12-31 RX ORDER — FLUTICASONE PROPIONATE AND SALMETEROL 250; 50 UG/1; UG/1
1 POWDER RESPIRATORY (INHALATION) DAILY PRN
COMMUNITY

## 2024-12-31 RX ORDER — MULTIVITAMIN,THERAPEUTIC
1 TABLET ORAL DAILY
COMMUNITY

## 2024-12-31 RX ORDER — CLINDAMYCIN PHOSPHATE 900 MG/50ML
900 INJECTION, SOLUTION INTRAVENOUS EVERY 8 HOURS
Status: CANCELLED | OUTPATIENT
Start: 2024-12-31

## 2024-12-31 RX ORDER — CEFTRIAXONE 2 G/1
2 INJECTION, POWDER, FOR SOLUTION INTRAMUSCULAR; INTRAVENOUS ONCE
Status: COMPLETED | OUTPATIENT
Start: 2024-12-31 | End: 2024-12-31

## 2024-12-31 RX ORDER — ONDANSETRON 2 MG/ML
4 INJECTION INTRAMUSCULAR; INTRAVENOUS EVERY 6 HOURS PRN
Status: DISCONTINUED | OUTPATIENT
Start: 2024-12-31 | End: 2025-01-04 | Stop reason: HOSPADM

## 2024-12-31 RX ORDER — CEFAZOLIN SODIUM 2 G/100ML
2 INJECTION, SOLUTION INTRAVENOUS EVERY 8 HOURS
Status: DISCONTINUED | OUTPATIENT
Start: 2025-01-01 | End: 2025-01-04 | Stop reason: HOSPADM

## 2024-12-31 RX ORDER — ALBUTEROL SULFATE 90 UG/1
2 INHALANT RESPIRATORY (INHALATION) EVERY 6 HOURS
Status: DISCONTINUED | OUTPATIENT
Start: 2024-12-31 | End: 2025-01-04 | Stop reason: HOSPADM

## 2024-12-31 RX ORDER — CEFAZOLIN SODIUM 1 G/50ML
750 SOLUTION INTRAVENOUS EVERY 12 HOURS
Status: DISCONTINUED | OUTPATIENT
Start: 2024-12-31 | End: 2025-01-01

## 2024-12-31 RX ORDER — CEFEPIME HYDROCHLORIDE 2 G/1
2 INJECTION, POWDER, FOR SOLUTION INTRAVENOUS EVERY 8 HOURS
Status: CANCELLED | OUTPATIENT
Start: 2024-12-31

## 2024-12-31 RX ORDER — AMOXICILLIN 250 MG
2 CAPSULE ORAL 2 TIMES DAILY PRN
Status: DISCONTINUED | OUTPATIENT
Start: 2024-12-31 | End: 2025-01-04 | Stop reason: HOSPADM

## 2024-12-31 RX ORDER — HYDROMORPHONE HCL IN WATER/PF 6 MG/30 ML
0.4 PATIENT CONTROLLED ANALGESIA SYRINGE INTRAVENOUS
Status: DISCONTINUED | OUTPATIENT
Start: 2024-12-31 | End: 2025-01-02

## 2024-12-31 RX ORDER — ONDANSETRON 2 MG/ML
4 INJECTION INTRAMUSCULAR; INTRAVENOUS ONCE
Status: COMPLETED | OUTPATIENT
Start: 2024-12-31 | End: 2024-12-31

## 2024-12-31 RX ADMIN — ONDANSETRON 4 MG: 2 INJECTION, SOLUTION INTRAMUSCULAR; INTRAVENOUS at 07:59

## 2024-12-31 RX ADMIN — VANCOMYCIN HYDROCHLORIDE 750 MG: 1 INJECTION, POWDER, LYOPHILIZED, FOR SOLUTION INTRAVENOUS at 20:59

## 2024-12-31 RX ADMIN — SODIUM CHLORIDE 70 ML: 9 INJECTION, SOLUTION INTRAVENOUS at 11:00

## 2024-12-31 RX ADMIN — CEFTRIAXONE SODIUM 2 G: 2 INJECTION, POWDER, FOR SOLUTION INTRAMUSCULAR; INTRAVENOUS at 08:53

## 2024-12-31 RX ADMIN — PROCHLORPERAZINE EDISYLATE 10 MG: 5 INJECTION INTRAMUSCULAR; INTRAVENOUS at 16:34

## 2024-12-31 RX ADMIN — ONDANSETRON 4 MG: 2 INJECTION, SOLUTION INTRAMUSCULAR; INTRAVENOUS at 13:06

## 2024-12-31 RX ADMIN — SODIUM CHLORIDE 1000 ML: 9 INJECTION, SOLUTION INTRAVENOUS at 08:00

## 2024-12-31 RX ADMIN — ACETAMINOPHEN 650 MG: 325 TABLET, FILM COATED ORAL at 20:59

## 2024-12-31 RX ADMIN — SODIUM CHLORIDE, POTASSIUM CHLORIDE, SODIUM LACTATE AND CALCIUM CHLORIDE: 600; 310; 30; 20 INJECTION, SOLUTION INTRAVENOUS at 15:16

## 2024-12-31 RX ADMIN — MORPHINE SULFATE 4 MG: 4 INJECTION, SOLUTION INTRAMUSCULAR; INTRAVENOUS at 07:59

## 2024-12-31 RX ADMIN — VANCOMYCIN HYDROCHLORIDE 1500 MG: 10 INJECTION, POWDER, LYOPHILIZED, FOR SOLUTION INTRAVENOUS at 09:34

## 2024-12-31 RX ADMIN — HYDROMORPHONE HYDROCHLORIDE 0.4 MG: 0.2 INJECTION, SOLUTION INTRAMUSCULAR; INTRAVENOUS; SUBCUTANEOUS at 13:19

## 2024-12-31 RX ADMIN — ACETAMINOPHEN 975 MG: 325 TABLET, FILM COATED ORAL at 07:59

## 2024-12-31 RX ADMIN — IOPAMIDOL 90 ML: 755 INJECTION, SOLUTION INTRAVENOUS at 10:59

## 2024-12-31 RX ADMIN — HYDROMORPHONE HYDROCHLORIDE 0.4 MG: 0.2 INJECTION, SOLUTION INTRAMUSCULAR; INTRAVENOUS; SUBCUTANEOUS at 16:41

## 2024-12-31 ASSESSMENT — ACTIVITIES OF DAILY LIVING (ADL)
ADLS_ACUITY_SCORE: 46
ADLS_ACUITY_SCORE: 30
ADLS_ACUITY_SCORE: 46
ADLS_ACUITY_SCORE: 46
ADLS_ACUITY_SCORE: 30
ADLS_ACUITY_SCORE: 46
ADLS_ACUITY_SCORE: 30
ADLS_ACUITY_SCORE: 46
ADLS_ACUITY_SCORE: 30
ADLS_ACUITY_SCORE: 30
ADLS_ACUITY_SCORE: 46
ADLS_ACUITY_SCORE: 46

## 2024-12-31 ASSESSMENT — COLUMBIA-SUICIDE SEVERITY RATING SCALE - C-SSRS
6. HAVE YOU EVER DONE ANYTHING, STARTED TO DO ANYTHING, OR PREPARED TO DO ANYTHING TO END YOUR LIFE?: NO
1. IN THE PAST MONTH, HAVE YOU WISHED YOU WERE DEAD OR WISHED YOU COULD GO TO SLEEP AND NOT WAKE UP?: NO
2. HAVE YOU ACTUALLY HAD ANY THOUGHTS OF KILLING YOURSELF IN THE PAST MONTH?: NO

## 2024-12-31 NOTE — H&P
[facility]    History and Physical - Hospitalist Service       Date of Admission:  12/31/2024    Assessment & Plan      Cindi Padilla is a 34 year old female admitted on 12/31/2024 for evaluation of right leg pain, groin pain and lightheadedness. She has a past medical history of asthma and anxiety.      Sepsis  Right leg cellulitis  Ms. Padilla presents on 12/31/2024 for evaluation of right leg and groin pain as well as lightheadedness. She reports that her symptoms began at approximately 0640 on 12/30/2024. The pain has been radiating down to her knee. She exercised and did some stretching, but was unable to compete her workout due to lightheadedness. She endorses fever and when she awoke on 12/31/2024, she noticed that her right leg was red, swollen, painful and difficult to walk on. She has been nauseous and has vomited once. She reports that she previously had what she thought was an ingrown hair on her leg, which she treated and has since scabbed over. In ED, her heart rate is elevated at 120. She did receive a 1 liter fluid bolus in ED. Stat CT of her LE has been ordered and vancomycin has been ordered. Plan is as follows:   *Lactic acid 1.6  *WBC 21.0  *hcG:<1  *CK total: 55  *CT of the right leg:  No evidence for osteomyelitis, septic arthropathy, or abscess. Nonspecific edema or cellulitis within the subcutaneous soft tissues of the lower leg just above the ankle. Additional wispy edema or cellulitis within the anteromedial aspect of the proximal thigh where there is some reactive lymphadenopathy. No evidence for foreign body. No subcutaneous gas. Exam otherwise negative.  -Blood cultures obtained in ED  -IV Cefazolin  -IV Vancomycin   -Q4h vital signs  -LR infusion 125/h  -Repeat lactic 12/31/2024 at 1400  -Acetaminophen for mild pain  -Oxycodone 2.5-5 mg for moderate to severe pain   -Dilaudid 0.2-0.4 for moderate to severe pain if unable to take PO    Acute kidney injury  Likely  prerenal. She received a 1 liter fluid bolus in the ED. Will start her on maintenance IVF and recheck BMP in 6 hours.  *Baseline creatinine: 0.71-1.02  *Creatinine 12/31/2024: 1.15  -Avoid nephrotoxic agents  -Repeat BMP 12/31/2024 at 1400  -Repeat BMP 1/1/2025 in AM    Asthma  -Resume Advair inhaler 1 puff every 12 hours  -Resume PTA albuterol 2 puffs q6h as needed for dyspnea and/or wheezing    Depression/anxiety  -Reports that she is no longer taking PTA sertraline  -Monitor for signs/symptoms of anxiety/depression        Diet:  NPO  DVT Prophylaxis: Pneumatic Compression Devices  Farias Catheter: Not present  Lines: None     Cardiac Monitoring: None  Code Status:  Full code    Clinically Significant Risk Factors Present on Admission                                 # Asthma: noted on problem list      Disposition Plan     Medically Ready for Discharge: Anticipated Tomorrow     The patient's care was discussed with the Attending Physician, Dr. Perez .    Tayo Keating NP  Hospitalist Service  [facility]  Securely message with [employer] (more info)  Text page via [employer] Paging/Directory     ______________________________________________________________________    Chief Complaint   Left leg cellulitis     History is obtained from the patient    History of Present Illness   Cindi Padilla is a 34 year old female who presents 12/31/2024 with right lower leg cellulitis, with symptoms including right leg and groin pain as well as lightheadedness and nausea. She reports that the pain began in her groin at approximately 0640 on 12/30/2024. Since onset, the pain has been radiating down to her knee. On 12/30/2024 she did exercise and did some stretching, but was unable to compete her workout due to lightheadedness. She endorses fever and when she awoke on 12/31/2024, she noticed that her right leg was red, swollen, painful and difficult to walk on. She has been nauseous and has vomited once.  Of note, she reports that she previously had what she thought was an ingrown hair on the distal portion of her leg, which she treated and has since scabbed over. On the morning of 12/31/2024, she did take a dose of ibuprofen prior to coming to the ED.  Upon arrival to the ED, she was noted to have a heart rate 120. At baseline, her heart rate is in the mid 50s. The erythema is noted to extend from her right knee all the way down to the dorsal aspect of her foot. She also has tracking from her knee up to her groin with an additional area of erythema on the medial aspect of her upper thigh. No crepitous was noted on exam. However, the area is warm and tender to the touch. She endorses moderate pain in her RLE, especially in the proximal aspect of her right leg and knee. However, she presently denies chest pain, shortness of breath, abdominal pain, calf tenderness, numbness or tingling in her LE. CMS is intact. Lactic acid is not elevated, but she is noted to have significant leucocytosis with WBC of 21.0. After blood cultures were obtained, she received a dose of ceftriaxone as well as a 1 liter fluid bolus. This will be followed by vancomycin. A stat CT of her LE has been ordered. Plan to continue antibiotic coverage with cefazolin and vancomycin. In addition to the aforementioned, she is noted to have a mildly elevated creatinine. This is most likely prerenal, so will start her on maintenance IV fluids and recheck BMP 12/31/2024 at 1400 as well as 1/1/2025 in the AM.  No free air or evidence of necrotizing faucitis seen on CT. Orders have been placed to keep the leg elevated and to demarcate the areas of erythema every shift.         Past Medical History    Past Medical History:   Diagnosis Date    Asthma     Lifelong, currently stable.  No inhaler use for 3months, and infrequent use during preg (8/25/19)    NO ACTIVE PROBLEMS      Past Surgical History   Past Surgical History:   Procedure Laterality Date      SECTION N/A 2019    Procedure:  SECTION;  Surgeon: Herminio Phoenix MD;  Location:  L+D     SECTION N/A 2022    Procedure: REPEAT  SECTION;  Surgeon: Marely Fuller MD;  Location:  L+D    LASIK BILATERAL Bilateral 2016    wisdom teeth      ZZC REPAIR CRUCIATE LIGAMENT,KNEE Right     No current issues     Prior to Admission Medications   Prior to Admission Medications   Prescriptions Last Dose Informant Patient Reported? Taking?   Prenatal Multivit-Min-Fe-FA (PRE- PO)   Yes No   Sig: Take 1 tablet by mouth daily   albuterol (PROAIR HFA/PROVENTIL HFA/VENTOLIN HFA) 108 (90 Base) MCG/ACT inhaler   No No   Sig: Inhale 2 puffs into the lungs every 6 hours.   fluticasone-salmeterol (ADVAIR) 250-50 MCG/ACT inhaler   No No   Sig: Inhale 1 puff into the lungs every 12 hours.   sertraline (ZOLOFT) 25 MG tablet   Yes No   Sig: Take 1 tablet (25 mg) by mouth daily      Facility-Administered Medications: None      Physical Exam   Vital Signs: Temp: 97.7  F (36.5  C) Temp src: Oral BP: 103/67 Pulse: 120   Resp: 18 SpO2: 98 % O2 Device: None (Room air)    Weight: 154 lbs 0 oz  Physical Exam  Vitals reviewed.   Constitutional:       General: She is not in acute distress.  Cardiovascular:      Rate and Rhythm: Regular rhythm. Tachycardia present.      Pulses: Normal pulses.      Heart sounds: Normal heart sounds.   Pulmonary:      Effort: Pulmonary effort is normal.      Breath sounds: Normal breath sounds and air entry.   Abdominal:      Palpations: Abdomen is soft.      Tenderness: There is no abdominal tenderness.   Musculoskeletal:      Right upper leg: Tenderness present.      Right lower leg: Swelling and tenderness present.      Comments: Erythematous tracking from groin to the knee. Swelling and erythema from knee to dorsal aspect of the right foot. Tender to the touch, no crepitous. Small scab on the distal aspect of the right lower leg without drainage.   Skin:      General: Skin is warm and dry.      Capillary Refill: Capillary refill takes less than 2 seconds.      Findings: Erythema present.      Comments: Right lower extremity erythema with tracking. Warm and tender to the touch.    Neurological:      General: No focal deficit present.      Mental Status: She is alert.   Psychiatric:         Attention and Perception: Attention normal.         Mood and Affect: Mood normal.         Speech: Speech normal.         Behavior: Behavior normal. Behavior is cooperative.         Thought Content: Thought content normal.         Cognition and Memory: Cognition normal.         Judgment: Judgment normal.     Medical Decision Making   75 MINUTES SPENT BY ME on the date of service doing chart review, history, exam, documentation & further activities per the note.      Data     I have personally reviewed the following data over the past 24 hrs:    21.0 (H)  \   13.1   / 230     135 100 15.2 /  113 (H)   4.0 23 1.15 (H) \     ALT: 11 AST: 17 AP: 67 TBILI: 0.5   ALB: 3.9 TOT PROTEIN: 7.4 LIPASE: N/A     Procal: N/A CRP: N/A Lactic Acid: 1.6         Imaging results reviewed over the past 24 hrs:   No results found for this or any previous visit (from the past 24 hours).

## 2024-12-31 NOTE — ED NOTES
"[facility]  ED Nurse Handoff Report    ED Chief complaint: Leg Pain      ED Diagnosis:   Final diagnoses:   Cellulitis of right leg   DESTIN (acute kidney injury) (H)       Code Status: pending discussion with admitting MD    Allergies: No Known Allergies    Patient Story: Patient here  with right leg pain ,swelling and  redness, she took 800 mg of  motrin at 430 am today.  She also c/o feeling lightheaded with nausea     Focused Assessment:  A/Ox4. Afebrile. VSS. Ambulatory. PIV x1.     Treatments and/or interventions provided: labs, imaging, fluids, pain meds, antiemetics, abx  Patient's response to treatments and/or interventions: tolerated well    To be done/followed up on inpatient unit:  pending    Does this patient have any cognitive concerns?:  none    Activity level - Baseline/Home:  Independent  Activity Level - Current:    hasn't gottem up in ED    Patient's Preferred language: English   Needed?: No    Isolation: None  Infection: Not Applicable  Patient tested for COVID 19 prior to admission: NO  Bariatric?: No    Vital Signs:   Vitals:    12/31/24 0706   BP: 103/67   Pulse: 120   Resp: 18   Temp: 97.7  F (36.5  C)   TempSrc: Oral   SpO2: 98%   Weight: 69.9 kg (154 lb)   Height: 1.676 m (5' 6\")       Cardiac Rhythm:     Was the PSS-3 completed:   Yes  What interventions are required if any?               Family Comments:   OBS brochure/video discussed/provided to patient/family: N/A              Name of person given brochure if not patient:               Relationship to patient:     For the majority of the shift this patient's behavior was Green.   Behavioral interventions performed were .    ED NURSE PHONE NUMBER: [phone]         "

## 2024-12-31 NOTE — ED TRIAGE NOTES
Patient here  with right leg pain ,swelling and  redness, she took 800 mg of  motrin at 430 am today.  She also c/o feeling lightheaded with nausea     Triage Assessment (Adult)       Row Name 12/31/24 0704          Triage Assessment    Airway WDL WDL        Respiratory WDL    Respiratory WDL WDL        Skin Circulation/Temperature WDL    Skin Circulation/Temperature WDL WDL        Cardiac WDL    Cardiac WDL WDL        Peripheral/Neurovascular WDL    Peripheral Neurovascular WDL WDL        Cognitive/Neuro/Behavioral WDL    Cognitive/Neuro/Behavioral WDL WDL

## 2024-12-31 NOTE — PROGRESS NOTES
RECEIVING UNIT ED HANDOFF REVIEW    ED Nurse Handoff Report was reviewed by: Iveth Hoyos RN on December 31, 2024 at 3:52 PM

## 2024-12-31 NOTE — PHARMACY-ADMISSION MEDICATION HISTORY
Pharmacist Admission Medication History    Admission medication history is complete. The information provided in this note is only as accurate as the sources available at the time of the update.    Information Source(s): Patient and CareEverywhere/SureScripts via in-person    Pertinent Information:   - not currently taking sertraline, discussing restarting with primary care physician so left on list  - has taken acetaminophen (1000 mg x2 doses) and ibuprofen (800 mg x3 doses) for pain over last 24 hours, does not take regularly outside of this so not added to list    Changes made to PTA medication list:  Added: multivitamin, loratidine, wixela  Deleted: advair, prenatal vitamin  Changed: None    Allergies reviewed with patient and updates made in EHR: no    Medication History Completed By: Edison Ocampo RPH 12/31/2024 9:48 AM    PTA Med List   Medication Sig Last Dose/Taking    albuterol (PROAIR HFA/PROVENTIL HFA/VENTOLIN HFA) 108 (90 Base) MCG/ACT inhaler Inhale 2 puffs into the lungs every 6 hours. Taking    fluticasone-salmeterol (ADVAIR) 250-50 MCG/ACT inhaler Inhale 1 puff into the lungs daily as needed (short of breath, allergies). Taking As Needed    loratadine (CLARITIN) 10 MG tablet Take 10 mg by mouth daily as needed for allergies. Taking As Needed    multivitamin, therapeutic (THERA-VIT) TABS tablet Take 1 tablet by mouth daily. 12/31/2024 Morning

## 2024-12-31 NOTE — ED PROVIDER NOTES
"  Emergency Department Note      History of Present Illness     Chief Complaint   Leg Pain      HPI   Cindi Padilla is a 34 year old female with a history of asthma and anxiety who presents to the ED today for evaluation of leg pain. The patient reports she has right leg pain that started yesterday morning around 0640. She also reports getting lightheaded and having groin pain that radiated down to her knee around the same time. She worked out with some stretching yesterday afternoon, but she went home early due to increased lightheadedness. She reports having a subjective fever at home yesterday as well. After waking up this morning, she reports her right leg was painful to the touch, difficult to walk on, red, and swollen. The redness was also present around her groin. She reports feeling nauseous this morning and vomited once. 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 The patient reports she's treated her symptoms with Tylenol and ibuprofen, having taken ibuprofen before presentation. She mentions she has asthma and has had 2 c-sections in the past, but doesn't have diabetes mellitus.     Independent Historian   None    Review of External Notes   Reviewing patient's office visit from 10/8/2024, patient has a history of adjustment disorder with mixed anxiety and depressed mood, intermittent asthma.    Past Medical History     Medical History and Problem List   Asthma   Anxiety  Cyst of Bartholin's gland duct    Medications   Albuterol inhaler  Advair inhaler  Sertraline  ParaGard intrauterine device     Surgical History    section x2  Dale teeth removal  Cruciate ligament repair    Physical Exam     Patient Vitals for the past 24 hrs:   BP Temp Temp src Pulse Resp SpO2 Height Weight   24 0706 103/67 97.7  F (36.5  C) Oral 120 18 98 % 1.676 m (5' 6\") 69.9 kg (154 lb)     Physical " Exam  General: Well-nourished, resting comfortably when I enter the room  Eyes: Pupils equal, conjunctivae pink no scleral icterus or conjunctival injection  ENT:  Moist mucus membranes  Respiratory:  Lungs clear to auscultation bilaterally, no crackles/rubs/wheezes.  Good air movement  CV: Normal rate and rhythm, no murmurs  GI:  Abdomen soft and non-distended.  No tenderness, guarding or rebound  Skin: Right lower extremity is red, hot, swollen.  No crepitus.  Musculoskeletal: No pain with movement of the ankle joint on the right side. Small scab on the anterior shin of the right lower leg.   Neuro: Alert and oriented to person/place/time  Psychiatric: Normal affect                  Diagnostics     Lab Results   Labs Ordered and Resulted from Time of ED Arrival to Time of ED Departure   COMPREHENSIVE METABOLIC PANEL - Abnormal       Result Value    Sodium 135      Potassium 4.0      Carbon Dioxide (CO2) 23      Anion Gap 12      Urea Nitrogen 15.2      Creatinine 1.15 (*)     GFR Estimate 64      Calcium 9.2      Chloride 100      Glucose 113 (*)     Alkaline Phosphatase 67      AST 17      ALT 11      Protein Total 7.4      Albumin 3.9      Bilirubin Total 0.5     CBC WITH PLATELETS AND DIFFERENTIAL - Abnormal    WBC Count 21.0 (*)     RBC Count 4.33      Hemoglobin 13.1      Hematocrit 37.8      MCV 87      MCH 30.3      MCHC 34.7      RDW 12.4      Platelet Count 230      % Neutrophils 94      % Lymphocytes 2      % Monocytes 3      % Eosinophils 0      % Basophils 0      % Immature Granulocytes 1      NRBCs per 100 WBC 0      Absolute Neutrophils 19.8 (*)     Absolute Lymphocytes 0.4 (*)     Absolute Monocytes 0.7      Absolute Eosinophils 0.0      Absolute Basophils 0.1      Absolute Immature Granulocytes 0.2      Absolute NRBCs 0.0     LACTIC ACID WHOLE BLOOD WITH 1X REPEAT IN 2 HR WHEN >2 - Normal    Lactic Acid, Initial 1.6     HCG QUANTITATIVE PREGNANCY - Normal    hCG Quantitative <1     CK TOTAL -  Normal    CK 55     BLOOD CULTURE   BLOOD CULTURE   MRSA MSSA PCR, NASAL SWAB       Imaging   CT Tibia Fibula Lower Leg Right w Contrast   Final Result   IMPRESSION:   1.  No evidence for osteomyelitis, septic arthropathy, or abscess.   2.  Nonspecific edema or cellulitis within the subcutaneous soft   tissues of the lower leg just above the ankle.   3.  Additional wispy edema or cellulitis within the anteromedial   aspect of the proximal thigh where there is some reactive   lymphadenopathy.   4.  No evidence for foreign body.   5.  No subcutaneous gas.   6.  Exam otherwise negative.         AMADO SANTANA MD            SYSTEM ID:  [id number]        Independent Interpretation   None    ED Course      Medications Administered   Medications   ceFAZolin (ANCEF) 2 g in 100 mL D5W intermittent infusion (has no administration in time range)   acetaminophen (TYLENOL) tablet 975 mg (975 mg Oral $Given 12/31/24 0759)   morphine (PF) injection 4 mg (4 mg Intravenous $Given 12/31/24 0759)   ondansetron (ZOFRAN) injection 4 mg (4 mg Intravenous $Given 12/31/24 0759)   sodium chloride 0.9% BOLUS 1,000 mL (0 mLs Intravenous Stopped 12/31/24 0849)   cefTRIAXone (ROCEPHIN) 2 g vial to attach to  ml bag for ADULTS or NS 50 ml bag for PEDS (0 g Intravenous Stopped 12/31/24 0932)   vancomycin (VANCOCIN) 1,500 mg in 0.9% NaCl 265 mL intermittent infusion (0 mg Intravenous Stopped 12/31/24 1132)   iopamidol (ISOVUE-370) solution 90 mL (90 mLs Intravenous $Given 12/31/24 1059)   Saline Flush (70 mLs Intravenous $Given 12/31/24 1100)     Discussion of Management   Admitting Hospitalist, Dr. Keating    ED Course   ED Course as of 12/31/24 1240   Tue Dec 31, 2024   0732 I obtained history and examined the patient as noted above.    0758 I rechecked and updated the patient.    0847 I rechecked and updated the patient. I spoke with her about being admitted to the hospital.    0912 I spoke with Dr. Keating, admitting hospitalist, on  the phone regarding the patient.    3066 I rechecked and updated the patient about her admission.       Additional Documentation  None    Medical Decision Making / Diagnosis     CMS Diagnoses: None    MIPS       None    MDM   Cindi Padilla is a 34 year old female with a history of asthma who is presenting to the emergency department with a complaint of cellulitis of her right leg.  Patient reports that she did have a, swollen leg with fever.  She reports that it is painful.  On exam patient's right lower leg is red, hot, swollen.  It is circumferential.  There is also some redness by her groin.  I do not palpate any abscess.  I do not believe there is any pain out of proportion, and there is no crepitus of her leg.  I have lower suspicion for deep space infection such as necrotizing fasciitis.  Her lab work shows an elevated white blood cell count of 21, with a normal lactic acid.  She does have a slight DESTIN and is given fluids.  Blood cultures are drawn and patient is given a dose of Rocephin.  She is also given Tylenol and morphine.  I did speak with the patient about discharging home versus staying in the hospital.  And she would like to stay at least overnight.  I spoke with Dr. Ramesh Keating with the hospitalist team, he agrees with adding Vanco and getting a CT scan of the leg as her symptoms have been quick in onset.  Patient is admitted.  CT scan shows cellulitis, no abscess or necrotizing fasciitis.    Disposition   The patient was admitted to the hospital.     Diagnosis     ICD-10-CM    1. Cellulitis of right leg  L03.115       2. DESTIN (acute kidney injury) (H)  N17.9            Scribe Disclosure:  I, Tessa Diaz, am serving as a scribe at 8:10 AM on 12/31/2024 to document services personally performed by Radha Crespo MD based on my observations and the provider's statements to me.        Radha Crespo MD  12/31/24 8545

## 2025-01-01 LAB
ANION GAP SERPL CALCULATED.3IONS-SCNC: 12 MMOL/L (ref 7–15)
BUN SERPL-MCNC: 9.3 MG/DL (ref 6–20)
CALCIUM SERPL-MCNC: 8.6 MG/DL (ref 8.8–10.4)
CHLORIDE SERPL-SCNC: 102 MMOL/L (ref 98–107)
CREAT SERPL-MCNC: 0.95 MG/DL (ref 0.51–0.95)
EGFRCR SERPLBLD CKD-EPI 2021: 81 ML/MIN/1.73M2
ERYTHROCYTE [DISTWIDTH] IN BLOOD BY AUTOMATED COUNT: 13 % (ref 10–15)
GLUCOSE SERPL-MCNC: 150 MG/DL (ref 70–99)
HCO3 SERPL-SCNC: 20 MMOL/L (ref 22–29)
HCT VFR BLD AUTO: 34.4 % (ref 35–47)
HGB BLD-MCNC: 12 G/DL (ref 11.7–15.7)
LACTATE SERPL-SCNC: 2 MMOL/L (ref 0.7–2)
MCH RBC QN AUTO: 30.7 PG (ref 26.5–33)
MCHC RBC AUTO-ENTMCNC: 34.9 G/DL (ref 31.5–36.5)
MCV RBC AUTO: 88 FL (ref 78–100)
MRSA DNA SPEC QL NAA+PROBE: NEGATIVE
PLATELET # BLD AUTO: 217 10E3/UL (ref 150–450)
POTASSIUM SERPL-SCNC: 3.6 MMOL/L (ref 3.4–5.3)
RBC # BLD AUTO: 3.91 10E6/UL (ref 3.8–5.2)
SA TARGET DNA: NEGATIVE
SODIUM SERPL-SCNC: 134 MMOL/L (ref 135–145)
WBC # BLD AUTO: 20.9 10E3/UL (ref 4–11)

## 2025-01-01 PROCEDURE — 250N000011 HC RX IP 250 OP 636

## 2025-01-01 PROCEDURE — 83605 ASSAY OF LACTIC ACID: CPT | Performed by: INTERNAL MEDICINE

## 2025-01-01 PROCEDURE — 250N000013 HC RX MED GY IP 250 OP 250 PS 637

## 2025-01-01 PROCEDURE — 87640 STAPH A DNA AMP PROBE: CPT

## 2025-01-01 PROCEDURE — 250N000013 HC RX MED GY IP 250 OP 250 PS 637: Performed by: INTERNAL MEDICINE

## 2025-01-01 PROCEDURE — 120N000001 HC R&B MED SURG/OB

## 2025-01-01 PROCEDURE — 258N000003 HC RX IP 258 OP 636

## 2025-01-01 PROCEDURE — 80048 BASIC METABOLIC PNL TOTAL CA: CPT

## 2025-01-01 PROCEDURE — 258N000003 HC RX IP 258 OP 636: Performed by: INTERNAL MEDICINE

## 2025-01-01 PROCEDURE — 36415 COLL VENOUS BLD VENIPUNCTURE: CPT

## 2025-01-01 PROCEDURE — 36415 COLL VENOUS BLD VENIPUNCTURE: CPT | Performed by: INTERNAL MEDICINE

## 2025-01-01 PROCEDURE — 85027 COMPLETE CBC AUTOMATED: CPT

## 2025-01-01 PROCEDURE — 250N000011 HC RX IP 250 OP 636: Performed by: INTERNAL MEDICINE

## 2025-01-01 PROCEDURE — 99232 SBSQ HOSP IP/OBS MODERATE 35: CPT | Performed by: INTERNAL MEDICINE

## 2025-01-01 PROCEDURE — 87641 MR-STAPH DNA AMP PROBE: CPT

## 2025-01-01 RX ORDER — ACETAMINOPHEN 500 MG
1000 TABLET ORAL ONCE
Status: COMPLETED | OUTPATIENT
Start: 2025-01-01 | End: 2025-01-01

## 2025-01-01 RX ORDER — CLINDAMYCIN PHOSPHATE 900 MG/50ML
900 INJECTION, SOLUTION INTRAVENOUS EVERY 8 HOURS
Status: DISCONTINUED | OUTPATIENT
Start: 2025-01-01 | End: 2025-01-03

## 2025-01-01 RX ORDER — SODIUM CHLORIDE AND POTASSIUM CHLORIDE 150; 900 MG/100ML; MG/100ML
INJECTION, SOLUTION INTRAVENOUS CONTINUOUS
Status: ACTIVE | OUTPATIENT
Start: 2025-01-01 | End: 2025-01-01

## 2025-01-01 RX ADMIN — CLINDAMYCIN PHOSPHATE 900 MG: 900 INJECTION, SOLUTION INTRAVENOUS at 20:09

## 2025-01-01 RX ADMIN — ACETAMINOPHEN 650 MG: 325 TABLET, FILM COATED ORAL at 15:35

## 2025-01-01 RX ADMIN — CEFAZOLIN SODIUM 2 G: 2 INJECTION, SOLUTION INTRAVENOUS at 17:53

## 2025-01-01 RX ADMIN — ACETAMINOPHEN 1000 MG: 500 TABLET, FILM COATED ORAL at 17:59

## 2025-01-01 RX ADMIN — POTASSIUM CHLORIDE AND SODIUM CHLORIDE: 900; 150 INJECTION, SOLUTION INTRAVENOUS at 12:40

## 2025-01-01 RX ADMIN — SODIUM CHLORIDE, POTASSIUM CHLORIDE, SODIUM LACTATE AND CALCIUM CHLORIDE: 600; 310; 30; 20 INJECTION, SOLUTION INTRAVENOUS at 07:59

## 2025-01-01 RX ADMIN — CEFAZOLIN SODIUM 2 G: 2 INJECTION, SOLUTION INTRAVENOUS at 08:00

## 2025-01-01 RX ADMIN — ONDANSETRON 4 MG: 4 TABLET, ORALLY DISINTEGRATING ORAL at 05:03

## 2025-01-01 RX ADMIN — SODIUM CHLORIDE 500 ML: 9 INJECTION, SOLUTION INTRAVENOUS at 18:46

## 2025-01-01 RX ADMIN — PROCHLORPERAZINE EDISYLATE 10 MG: 5 INJECTION INTRAMUSCULAR; INTRAVENOUS at 14:16

## 2025-01-01 RX ADMIN — ACETAMINOPHEN 650 MG: 325 TABLET, FILM COATED ORAL at 07:59

## 2025-01-01 RX ADMIN — PROCHLORPERAZINE EDISYLATE 10 MG: 5 INJECTION INTRAMUSCULAR; INTRAVENOUS at 06:26

## 2025-01-01 RX ADMIN — OXYCODONE HYDROCHLORIDE 5 MG: 5 TABLET ORAL at 12:28

## 2025-01-01 RX ADMIN — ONDANSETRON 4 MG: 4 TABLET, ORALLY DISINTEGRATING ORAL at 12:22

## 2025-01-01 RX ADMIN — SODIUM CHLORIDE, POTASSIUM CHLORIDE, SODIUM LACTATE AND CALCIUM CHLORIDE: 600; 310; 30; 20 INJECTION, SOLUTION INTRAVENOUS at 00:12

## 2025-01-01 ASSESSMENT — ACTIVITIES OF DAILY LIVING (ADL)
ADLS_ACUITY_SCORE: 33
ADLS_ACUITY_SCORE: 41
ADLS_ACUITY_SCORE: 33
ADLS_ACUITY_SCORE: 30
ADLS_ACUITY_SCORE: 38
ADLS_ACUITY_SCORE: 33
ADLS_ACUITY_SCORE: 30
ADLS_ACUITY_SCORE: 30
ADLS_ACUITY_SCORE: 38
ADLS_ACUITY_SCORE: 30
ADLS_ACUITY_SCORE: 30
ADLS_ACUITY_SCORE: 33
ADLS_ACUITY_SCORE: 30
ADLS_ACUITY_SCORE: 33
ADLS_ACUITY_SCORE: 30
ADLS_ACUITY_SCORE: 33
ADLS_ACUITY_SCORE: 30
ADLS_ACUITY_SCORE: 30
ADLS_ACUITY_SCORE: 33
ADLS_ACUITY_SCORE: 30

## 2025-01-01 NOTE — PROVIDER NOTIFICATION
MD Notification    Notified Person: MD    Notified Person Name:   KELVIN ZARATE  Notification Date/Time:  12- 9:12 pm  Notification Interaction:Amcom    Purpose of Notification:  pt has fever 103.3 PRN Tylenol  given, and pt hear rate is between 115-132 denies any chest pain or discomfort.   Orders Received:    Comments:

## 2025-01-01 NOTE — PROGRESS NOTES
[facility]    Hospitalist Progress Note    Assessment & Plan   Date of Admission:  12/31/2024        Assessment & Plan  Cindi Padilla is a 34 year old female admitted on 12/31/2024 for evaluation of right leg pain, groin pain and lightheadedness. She has a past medical history of asthma and anxiety.       Sepsis  Severe /extensive right leg cellulitis suspect staph or strep  Ms. Padilla presents on 12/31/2024 for evaluation of right leg and groin pain as well as lightheadedness. She reports that her symptoms began at approximately 0640 on 12/30/2024. The pain has been radiating down to her knee. She exercised and did some stretching, but was unable to compete her workout due to lightheadedness. She endorses fever and when she awoke on 12/31/2024, she noticed that her right leg was red, swollen, painful and difficult to walk on. She has been nauseous and has vomited once. She reports that she previously had what she thought was an ingrown hair on her leg, which she treated and has since scabbed over. In ED, her heart rate is elevated at 120. She did receive a 1 liter fluid bolus in ED. Stat CT of her LE has been ordered and vancomycin has been ordered. Plan is as follows:   *Lactic acid 1.6  *WBC 21.0  *hcG:<1  *CK total: 55  *CT of the right leg:  No evidence for osteomyelitis, septic arthropathy, or abscess. Nonspecific edema or cellulitis within the subcutaneous soft tissues of the lower leg just above the ankle. Additional wispy edema or cellulitis within the anteromedial aspect of the proximal thigh where there is some reactive lymphadenopathy. No evidence for foreign body. No subcutaneous gas. Exam otherwise negative.  -Blood cultures obtained in ED-no growth to date  -Vancomycin stopped given MRSA nares negative.  Ceftriaxone changed to cefazolin.  -Creatinine improved today.  White blood cell count remains elevated and stable at 20.  -Still has mild tachycardia.  Low normal systolic  blood pressures this may be her baseline.  -Clinically slight improvement.  Cellulitic rash receding somewhat and less intense erythema.  Tenderness also lessened.  -Anticipate at least a 3-day hospital stay for the degree of her cellulitis, suspect staph or strep.  CT imaging shows no clear deep infection.  There was some mild purulence at the site of where she removed an ingrown hair.    Plan-  -this afternoon temperature 101 with a heart rate of 122 with her fever, rash and exam stable. Lactate wnl, fluid bolus.  Will give Tylenol x 1 now check a lactic acid.  Continue fluids. Add clindamycin target possible strep toxin, fo  -continue cefazolin 2 g every 8 hours.  Patient instructed to increase her leg elevation in the right leg.  Discussed with RN continue IV fluids with normal saline.  Tylenol as needed for pain.   -Oxycodone 2.5-5 mg for moderate to severe pain   -Dilaudid 0.2-0.4 for moderate to severe pain if unable to take PO  -Follow for need of ID consult if patient does not clinically improve further as expected       Acute kidney injury-resolved  Likely prerenal. She received a 1 liter fluid bolus in the ED. Will start her on maintenance IVF and recheck BMP in 6 hours.  *Baseline creatinine: 0.71-1.02  *Creatinine 12/31/2024: 1.15  -Creatinine down to 0.95 following fluids.  -Avoid nephrotoxic agents  A.m. BMP    Asthma-  -no clear flare  -Resume Advair inhaler 1 puff every 12 hours  -Resume PTA albuterol 2 puffs q6h as needed for dyspnea and/or wheezing     Depression/anxiety  -Reports that she is no longer taking PTA sertraline  -Monitor for signs/symptoms of anxiety/depression        Diet: Regular diet  DVT Prophylaxis: Pneumatic Compression Devices  Farias Catheter: Not present  Lines: None     Cardiac Monitoring: None  Code Status:  Full code    DVT Prophylaxis: Patient ambulatory  Code Status: Full Code    Disposition: Dissipate discharge home  Medically Ready for Discharge: Anticipated in 2-4 Days  "pending clinical course of cellulitis    Clinically Significant Risk Factors         # Hyponatremia: Lowest Na = 134 mmol/L in last 2 days, will monitor as appropriate   # Hypocalcemia: Lowest Ca = 8.6 mg/dL in last 2 days, will monitor and replace as appropriate                    # Overweight: Estimated body mass index is 25.55 kg/m  as calculated from the following:    Height as of this encounter: 1.676 m (5' 6\").    Weight as of this encounter: 71.8 kg (158 lb 4.8 oz)., PRESENT ON ADMISSION     # Asthma: noted on problem list        Moderate complex medical decision making.  Greater than 35 minutes spent on patient care including charting, chart review, exam, , care coordination with bedside nurse.  Counseling family and patient    Thien Perez MD, MD  Text Page  (7am to 6pm)  Interval History   Patient states she is feeling slightly better.  Overall the pain over the rash has improved and the intensity of the redness and amount of redness has improved as well.  She has had some nausea.  She had a fever of 103 last night.    -Data reviewed today: I reviewed all new labs and imaging results over the last 24 hours. I personally reviewed laboratory studies and imaging studies since admission    Physical Exam   Temp: (!) 101.1  F (38.4  C) Temp src: Oral BP: 104/55 Pulse: (!) 122   Resp: 16 SpO2: 98 % O2 Device: None (Room air)    Vitals:    12/31/24 0706 01/01/25 0621   Weight: 69.9 kg (154 lb) 71.8 kg (158 lb 4.8 oz)     Vital Signs with Ranges  Temp:  [97.8  F (36.6  C)-103  F (39.4  C)] 101.1  F (38.4  C)  Pulse:  [] 122  Resp:  [15-16] 16  BP: ()/(54-61) 104/55  SpO2:  [95 %-98 %] 98 %  No intake/output data recorded.    Constitutional: No acute distress  Respiratory: Clear to auscultation bilaterally  Cardiovascular: Regular rate and rhythm no rubs gallops or murmurs appreciated slightly tachycardic  GI: Soft nontender nondistended  Skin/Integumen: Diffuse confluent erythema from " the right heel around the ankle dorsum of the foot up pretibial area circumferential around the distal leg just to below the knee.  Redness seems to be receding from black marking.  Overall intensity of the redness seems improved since yesterday on admission the proximal right thigh area of redness has expanded some tenderness overall has lessened.  No soft tissue tenderness outside the areas of redness.  Other:      Medications   Current Facility-Administered Medications   Medication Dose Route Frequency Provider Last Rate Last Admin    0.9% sodium chloride + KCl 20 mEq/L infusion   Intravenous Continuous Thien Perez  mL/hr at 01/01/25 1240 New Bag at 01/01/25 1240     Current Facility-Administered Medications   Medication Dose Route Frequency Provider Last Rate Last Admin    albuterol (PROVENTIL HFA/VENTOLIN HFA) inhaler  2 puff Inhalation Q6H Tayo Keating NP        ceFAZolin (ANCEF) 2 g in 100 mL D5W intermittent infusion  2 g Intravenous Q8H Tayo Keating  mL/hr at 01/01/25 0800 2 g at 01/01/25 0800    fluticasone-vilanterol (BREO ELLIPTA) 100-25 MCG/ACT inhaler 1 puff  1 puff Inhalation Daily Tayo Keating NP           Data   Recent Labs   Lab 01/01/25  0848 12/31/24  0758   WBC 20.9* 21.0*   HGB 12.0 13.1   MCV 88 87    230   * 135   POTASSIUM 3.6 4.0   CHLORIDE 102 100   CO2 20* 23   BUN 9.3 15.2   CR 0.95 1.15*   ANIONGAP 12 12   CHRIS 8.6* 9.2   * 113*   ALBUMIN  --  3.9   PROTTOTAL  --  7.4   BILITOTAL  --  0.5   ALKPHOS  --  67   ALT  --  11   AST  --  17       Imaging:   No results found for this or any previous visit (from the past 24 hours).

## 2025-01-01 NOTE — PHARMACY-VANCOMYCIN DOSING SERVICE
Pharmacy Vancomycin Initial Note  Date of Service 2024  Patient's  1990  34 year old, female    Indication: Bacteremia, MRSA, and Skin and Soft Tissue Infection    Current estimated CrCl = Estimated Creatinine Clearance: 76.8 mL/min (A) (based on SCr of 1.15 mg/dL (H)).    Creatinine for last 3 days  2024:  7:58 AM Creatinine 1.15 mg/dL    Recent Vancomycin Level(s) for last 3 days  No results found for requested labs within last 3 days.      Vancomycin IV Administrations (past 72 hours)                     vancomycin (VANCOCIN) 1,500 mg in 0.9% NaCl 265 mL intermittent infusion (mg) 1,500 mg New Bag 24 0934                    Nephrotoxins and other renal medications (From now, onward)      Start     Dose/Rate Route Frequency Ordered Stop    24 2130  vancomycin (VANCOCIN) 750 mg in sodium chloride 0.9 % 282.5 mL intermittent infusion         750 mg  over 60 Minutes Intravenous EVERY 12 HOURS 24 1917              Contrast Orders - past 72 hours (72h ago, onward)      Start     Dose/Rate Route Frequency Stop    24 1025  iopamidol (ISOVUE-370) solution 90 mL         90 mL Intravenous ONCE 24 1059            [employer] Prediction of Planned Initial Vancomycin Regimen  Regimen: 750 mg IV every 12 hours.  Start time: 21:30 on 2024  Exposure target: AUC24 (range)400-600 mg/L.hr   AUC24,ss: 467 mg/L.hr  Probability of AUC24 > 400: 67 %  Ctrough,ss: 15.2 mg/L  Probability of Ctrough,ss > 20: 26 %  Probability of nephrotoxicity (Lodise BAYRON ): 10 %          Plan:  Start vancomycin  750 mg IV q12h.   Vancomycin monitoring method: AUC  Vancomycin therapeutic monitoring goal: 400-600 mg*h/L  Pharmacy will check vancomycin levels as appropriate in 1-3 Days.    Serum creatinine levels will be ordered every 48 hours.      Petty Jose RPH , PharmD, BCPS

## 2025-01-01 NOTE — PLAN OF CARE
Date/Time 1/1, 4048-6951    Trauma/Ortho/Medical (Choose one)  medical    Diagnosis:RLE cellulitis  Mental Status: A&OX4  Activity/dangle SBA  Diet: Regular  Pain: Oxycodone,tylenol  Farias/Voiding: BR, had episode of bowel incontinent x 1.   02/LDA:RA, IV infusing  D/C Date: TBD  Other Info: VSS, CMS intact. RLE red and swollen, elevated on wedge. Intermittent nausea and emesis x1, zofran and compazine given.

## 2025-01-01 NOTE — PLAN OF CARE
Date & Time: 12/31 1600-1900  Diagnosis: DESTIN  Procedures: NA  Orientation/Cognitive: AOx4  VS/O2: VSS ex tachy, RA  Mobility: SBA  Diet: Regular  Pain Management: PRN dilaudid  Neuro: Intact  Bowel & Bladder: Continent  Skin: RLE - red, warm, +3 edema, marked with skin pen  Abnormal Labs: Cr 1.15, WBC 21.0, BC - pending  Tele: NA  IV Access/Drips/Fluids: R PIV LR @ 125ml/hr  Drains: NA  Tests/Imaging: CT - cellulitis  Consults: Hospitalist  Discharge Plan: Pending

## 2025-01-01 NOTE — PLAN OF CARE
Goal Outcome Evaluation:                    Summary: 1/1/25 [id number]  Diagnosis: Cellulitis  Orientation: AOx4  Vitals/Tele: VSS on RA ex tachy  IV Access/drains: IV  infusion  Diet: Regular  Mobility: SBA  GI/: continent   Wound/Skin: RLE +3 edema, redness and warm  Consults:  Discharge Plan: Pending  Other: High Temp, 103.3  down to 98.5 PRN Tylenol given, pt had one emesis occurrence this shift prn Zofran given. Tachy  See Flow sheets for assessment

## 2025-01-02 ENCOUNTER — APPOINTMENT (OUTPATIENT)
Dept: PHYSICAL THERAPY | Facility: CLINIC | Age: 35
DRG: 872 | End: 2025-01-02
Payer: COMMERCIAL

## 2025-01-02 VITALS
RESPIRATION RATE: 15 BRPM | HEIGHT: 66 IN | WEIGHT: 162.04 LBS | BODY MASS INDEX: 26.04 KG/M2 | HEART RATE: 104 BPM | OXYGEN SATURATION: 98 % | DIASTOLIC BLOOD PRESSURE: 68 MMHG | SYSTOLIC BLOOD PRESSURE: 102 MMHG | TEMPERATURE: 98 F

## 2025-01-02 LAB
ANION GAP SERPL CALCULATED.3IONS-SCNC: 9 MMOL/L (ref 7–15)
BACTERIA BLD CULT: NORMAL
BACTERIA BLD CULT: NORMAL
BUN SERPL-MCNC: 3.9 MG/DL (ref 6–20)
CALCIUM SERPL-MCNC: 8.4 MG/DL (ref 8.8–10.4)
CHLORIDE SERPL-SCNC: 106 MMOL/L (ref 98–107)
CREAT SERPL-MCNC: 0.88 MG/DL (ref 0.51–0.95)
EGFRCR SERPLBLD CKD-EPI 2021: 88 ML/MIN/1.73M2
ERYTHROCYTE [DISTWIDTH] IN BLOOD BY AUTOMATED COUNT: 13 % (ref 10–15)
GLUCOSE SERPL-MCNC: 109 MG/DL (ref 70–99)
HCO3 SERPL-SCNC: 23 MMOL/L (ref 22–29)
HCT VFR BLD AUTO: 32.4 % (ref 35–47)
HGB BLD-MCNC: 11.3 G/DL (ref 11.7–15.7)
MCH RBC QN AUTO: 30.6 PG (ref 26.5–33)
MCHC RBC AUTO-ENTMCNC: 34.9 G/DL (ref 31.5–36.5)
MCV RBC AUTO: 88 FL (ref 78–100)
PLATELET # BLD AUTO: 202 10E3/UL (ref 150–450)
POTASSIUM SERPL-SCNC: 3.8 MMOL/L (ref 3.4–5.3)
RBC # BLD AUTO: 3.69 10E6/UL (ref 3.8–5.2)
SODIUM SERPL-SCNC: 138 MMOL/L (ref 135–145)
WBC # BLD AUTO: 14.7 10E3/UL (ref 4–11)

## 2025-01-02 PROCEDURE — 97116 GAIT TRAINING THERAPY: CPT | Mod: GP

## 2025-01-02 PROCEDURE — 999N000111 HC STATISTIC OT IP EVAL DEFER

## 2025-01-02 PROCEDURE — 250N000011 HC RX IP 250 OP 636

## 2025-01-02 PROCEDURE — 250N000011 HC RX IP 250 OP 636: Performed by: INTERNAL MEDICINE

## 2025-01-02 PROCEDURE — 80048 BASIC METABOLIC PNL TOTAL CA: CPT | Performed by: INTERNAL MEDICINE

## 2025-01-02 PROCEDURE — 97161 PT EVAL LOW COMPLEX 20 MIN: CPT | Mod: GP

## 2025-01-02 PROCEDURE — 99232 SBSQ HOSP IP/OBS MODERATE 35: CPT | Performed by: INTERNAL MEDICINE

## 2025-01-02 PROCEDURE — 36415 COLL VENOUS BLD VENIPUNCTURE: CPT | Performed by: INTERNAL MEDICINE

## 2025-01-02 PROCEDURE — 250N000013 HC RX MED GY IP 250 OP 250 PS 637

## 2025-01-02 PROCEDURE — 85027 COMPLETE CBC AUTOMATED: CPT | Performed by: INTERNAL MEDICINE

## 2025-01-02 PROCEDURE — 84132 ASSAY OF SERUM POTASSIUM: CPT | Performed by: INTERNAL MEDICINE

## 2025-01-02 PROCEDURE — 120N000001 HC R&B MED SURG/OB

## 2025-01-02 RX ORDER — SENNOSIDES 8.6 MG
8.6 TABLET ORAL DAILY
Status: DISCONTINUED | OUTPATIENT
Start: 2025-01-02 | End: 2025-01-04 | Stop reason: HOSPADM

## 2025-01-02 RX ORDER — ENOXAPARIN SODIUM 100 MG/ML
40 INJECTION SUBCUTANEOUS EVERY 24 HOURS
Status: DISCONTINUED | OUTPATIENT
Start: 2025-01-02 | End: 2025-01-03

## 2025-01-02 RX ADMIN — ONDANSETRON 4 MG: 2 INJECTION, SOLUTION INTRAMUSCULAR; INTRAVENOUS at 21:01

## 2025-01-02 RX ADMIN — OXYCODONE HYDROCHLORIDE 5 MG: 5 TABLET ORAL at 16:54

## 2025-01-02 RX ADMIN — ACETAMINOPHEN 650 MG: 325 TABLET, FILM COATED ORAL at 08:45

## 2025-01-02 RX ADMIN — CEFAZOLIN SODIUM 2 G: 2 INJECTION, SOLUTION INTRAVENOUS at 16:56

## 2025-01-02 RX ADMIN — PROCHLORPERAZINE EDISYLATE 10 MG: 5 INJECTION INTRAMUSCULAR; INTRAVENOUS at 04:04

## 2025-01-02 RX ADMIN — HYDROMORPHONE HYDROCHLORIDE 0.2 MG: 0.2 INJECTION, SOLUTION INTRAMUSCULAR; INTRAVENOUS; SUBCUTANEOUS at 08:45

## 2025-01-02 RX ADMIN — CLINDAMYCIN PHOSPHATE 900 MG: 900 INJECTION, SOLUTION INTRAVENOUS at 12:00

## 2025-01-02 RX ADMIN — ENOXAPARIN SODIUM 40 MG: 40 INJECTION SUBCUTANEOUS at 08:44

## 2025-01-02 RX ADMIN — CLINDAMYCIN PHOSPHATE 900 MG: 900 INJECTION, SOLUTION INTRAVENOUS at 20:26

## 2025-01-02 RX ADMIN — CEFAZOLIN SODIUM 2 G: 2 INJECTION, SOLUTION INTRAVENOUS at 08:45

## 2025-01-02 RX ADMIN — ONDANSETRON 4 MG: 2 INJECTION, SOLUTION INTRAMUSCULAR; INTRAVENOUS at 00:40

## 2025-01-02 RX ADMIN — OXYCODONE HYDROCHLORIDE 5 MG: 5 TABLET ORAL at 21:07

## 2025-01-02 RX ADMIN — CLINDAMYCIN PHOSPHATE 900 MG: 900 INJECTION, SOLUTION INTRAVENOUS at 04:39

## 2025-01-02 RX ADMIN — OXYCODONE HYDROCHLORIDE 5 MG: 5 TABLET ORAL at 12:00

## 2025-01-02 RX ADMIN — CEFAZOLIN SODIUM 2 G: 2 INJECTION, SOLUTION INTRAVENOUS at 00:06

## 2025-01-02 ASSESSMENT — ACTIVITIES OF DAILY LIVING (ADL)
ADLS_ACUITY_SCORE: 38

## 2025-01-02 NOTE — PLAN OF CARE
Goal Outcome Evaluation:                      Date/Time 1/2/2025 [id number]  Diagnosis:RLE cellulitis  Mental Status: A&OX4  Activity/dangle SBA to BSC  Diet: Regular-poor appetite.  Pain: denies   Farias/Voiding: Bedside commode x1 loose stool  02/LDA:RA, IV saline lock   D/C Date: TBD  Other Info: VSS, CMS intact. RLE red and swollen. Right heel blanchable redness. Elevated with blue wedge. IV Zofran and compazine given intermittent nausea.

## 2025-01-02 NOTE — PROGRESS NOTES
[facility]    Hospitalist Progress Note    Assessment & Plan   Date of Admission:  12/31/2024        Assessment & Plan  Cindi Padilla is a 34 year old female admitted on 12/31/2024 for evaluation of right leg pain, groin pain and lightheadedness. She has a past medical history of asthma and anxiety.       Sepsis  Severe /extensive right leg cellulitis suspect staph or strep  Ms. Padilla presents on 12/31/2024 for evaluation of right leg and groin pain as well as lightheadedness. She reports that her symptoms began at approximately 0640 on 12/30/2024. The pain has been radiating down to her knee. She exercised and did some stretching, but was unable to compete her workout due to lightheadedness. She endorses fever and when she awoke on 12/31/2024, she noticed that her right leg was red, swollen, painful and difficult to walk on. She has been nauseous and has vomited once. She reports that she previously had what she thought was an ingrown hair on her leg, which she treated and has since scabbed over. In ED, her heart rate is elevated at 120. She did receive a 1 liter fluid bolus in ED. Stat CT of her LE has been ordered and vancomycin has been ordered. Plan is as follows:   *Lactic acid 1.6  *WBC 21.0  *hcG:<1  *CK total: 55  *CT of the right leg:  No evidence for osteomyelitis, septic arthropathy, or abscess. Nonspecific edema or cellulitis within the subcutaneous soft tissues of the lower leg just above the ankle. Additional wispy edema or cellulitis within the anteromedial aspect of the proximal thigh where there is some reactive lymphadenopathy. No evidence for foreign body. No subcutaneous gas. Exam otherwise negative.  -Blood cultures obtained in ED-no growth to date  -Vancomycin stopped given MRSA nares negative.  Ceftriaxone changed to cefazolin.  -suspect staph or strep-likely strep.  CT imaging shows no clear deep infection.  There was some mild purulence at the site of where she  "removed an ingrown hair.  -slowly clinically improving daily with decreased pain, improving rash. Wbc 20---> 14. Feeling globally better with improved malaise, appetite  Needing intermittent narcotics       Plan-  - Added clindamycin target possible strep toxin, doubt mrsa, may need for several days.   -continue cefazolin 2 g every 8 hours.  Patient instructed to increase her leg elevation in the right leg.  D Tylenol as needed for pain.   -Oxycodone 2.5-5 mg for moderate to severe pain   -Dilaudid 0.2-0.4 for moderate to severe pain if unable to take PO  -Follow for need of ID consult if patient does not clinically improve further as expected  -PT   -needs atleast another 24 hours of iv abx  -pcp follow up 1/9/25       Acute kidney injury-resolved  prerenal. Cr down to 0.88 with fluid boluses and mant fluids.   Now taking po with improved appetite   Prn bmp     Asthma-  -no clear flare  -Resume Advair inhaler 1 puff every 12 hours  -Resume PTA albuterol 2 puffs q6h as needed for dyspnea and/or wheezing     Depression/anxiety  -Reports that she is no longer taking PTA sertraline  -Monitor for signs/symptoms of anxiety/depression        Diet: Regular diet  DVT Prophylaxis: Pneumatic Compression Devices  Farias Catheter: Not present  Lines: None     Cardiac Monitoring: None  Code Status:  Full code    DVT Prophylaxis: Patient ambulatory  Code Status: Full Code    Disposition: Dissipate discharge home  Medically Ready for Discharge: Anticipated in 2-4 Days likely 1-2 days pending improvement in RLE cellulitis      Clinically Significant Risk Factors         # Hyponatremia: Lowest Na = 134 mmol/L in last 2 days, will monitor as appropriate                      # Overweight: Estimated body mass index is 26.15 kg/m  as calculated from the following:    Height as of this encounter: 1.676 m (5' 6\").    Weight as of this encounter: 73.5 kg (162 lb 0.6 oz)., PRESENT ON ADMISSION     # Asthma: noted on problem list    "     Moderate complex medical decision making.  Greater than 35 minutes spent on patient care including charting, chart review, exam, , care coordination with bedside nurse.  Counseling family and patient bedside today    Thien Perez MD, MD  Text Page  (7am to 6pm)  Interval History   Feeling better. No nausea. Pain improved. Rash improved RLE  Appetite improved. Wbc down. No fevers overnight.     Up with PT with crutches today  -Data reviewed today: I reviewed all new labs and imaging results over the last 24 hours. I personally reviewed laboratory studies and imaging studies since admission    Physical Exam   Temp: 98.5  F (36.9  C) Temp src: Oral BP: 107/66 Pulse: 109   Resp: 15 SpO2: 96 % O2 Device: None (Room air)    Vitals:    12/31/24 0706 01/01/25 0621 01/02/25 0545   Weight: 69.9 kg (154 lb) 71.8 kg (158 lb 4.8 oz) 73.5 kg (162 lb 0.6 oz)     Vital Signs with Ranges  Temp:  [97.2  F (36.2  C)-101.1  F (38.4  C)] 98.5  F (36.9  C)  Pulse:  [103-122] 109  Resp:  [15-17] 15  BP: ()/(55-84) 107/66  SpO2:  [96 %-99 %] 96 %  I/O last 3 completed shifts:  In: 360 [P.O.:360]  Out: -     Constitutional: Nad, looks better today. Less flushed, smiling   Respiratory: CTAB  Cardiovascular: Regular rate and rhythm no rubs gallops or murmurs appreciated slightly tachycardic  GI: Soft nontender nondistended  Skin/Integumen: Diffuse confluent erythema from the right heel around the ankle dorsum of the foot up pretibial area circumferential around the distal leg just to below the knee.  Redness has receded in RLE from caudal aspect of rash and upper thigh. Some extension of rash at heel.  Redness clearly improved.   No soft tissue tenderness outside the areas of redness.      Medications   Current Facility-Administered Medications   Medication Dose Route Frequency Provider Last Rate Last Admin     Current Facility-Administered Medications   Medication Dose Route Frequency Provider Last Rate Last Admin     albuterol (PROVENTIL HFA/VENTOLIN HFA) inhaler  2 puff Inhalation Q6H Tayo Keating NP        ceFAZolin (ANCEF) 2 g in 100 mL D5W intermittent infusion  2 g Intravenous Q8H Tayo Keating  mL/hr at 01/02/25 0845 2 g at 01/02/25 0845    clindamycin (CLEOCIN) 900 mg in 50 mL D5W intermittent infusion  900 mg Intravenous Q8H Thien Perez  mL/hr at 01/02/25 1200 900 mg at 01/02/25 1200    enoxaparin ANTICOAGULANT (LOVENOX) injection 40 mg  40 mg Subcutaneous Q24H Thien Perez MD   40 mg at 01/02/25 0844    fluticasone-vilanterol (BREO ELLIPTA) 100-25 MCG/ACT inhaler 1 puff  1 puff Inhalation Daily Tayo Keating NP           Data   Recent Labs   Lab 01/02/25  0741 01/01/25  0848 12/31/24  0758   WBC 14.7* 20.9* 21.0*   HGB 11.3* 12.0 13.1   MCV 88 88 87    217 230    134* 135   POTASSIUM 3.8 3.6 4.0   CHLORIDE 106 102 100   CO2 23 20* 23   BUN 3.9* 9.3 15.2   CR 0.88 0.95 1.15*   ANIONGAP 9 12 12   CHRIS 8.4* 8.6* 9.2   * 150* 113*   ALBUMIN  --   --  3.9   PROTTOTAL  --   --  7.4   BILITOTAL  --   --  0.5   ALKPHOS  --   --  67   ALT  --   --  11   AST  --   --  17       Imaging:   No results found for this or any previous visit (from the past 24 hours).

## 2025-01-02 NOTE — PROGRESS NOTES
01/02/25 1042   Appointment Info   Signing Clinician's Name / Credentials (PT) Luis E Acosta DPT   Rehab Comments (PT) Active orders canceled by physician. Reached out and reports he will re-order   Living Environment   People in Home spouse;child(carlos), dependent   Current Living Arrangements house   Home Accessibility stairs within home   Number of Stairs, Within Home, Primary greater than 10 stairs   Stair Railings, Within Home, Primary railings safe and in good condition   Transportation Anticipated car, drives self   Living Environment Comments Reports living in a house w/ spouse. Stairs to bedroom w/ one sided railing which changes sides. Lives w/ 2 children under the age of 5.   Self-Care   Usual Activity Tolerance excellent   Current Activity Tolerance moderate   Equipment Currently Used at Home none   Fall history within last six months no   Activity/Exercise/Self-Care Comment Reports baseline independent and active. No AD use. Works out. Takes care of her 2 children.   General Information   Onset of Illness/Injury or Date of Surgery 12/31/24   Referring Physician Tayo Keating NP   Patient/Family Therapy Goals Statement (PT) Return to home   Pertinent History of Current Problem (include personal factors and/or comorbidities that impact the POC) Cindi Padilla is a 34 year old female admitted on 12/31/2024 for evaluation of right leg pain, groin pain and lightheadedness. She has a past medical history of asthma and anxiety.   Existing Precautions/Restrictions fall   Cognition   Affect/Mental Status (Cognition) WFL   Orientation Status (Cognition) oriented x 4   Follows Commands (Cognition) WFL   Pain Assessment   Patient Currently in Pain Yes, see Vital Sign flowsheet  (Moderate pain to R leg)   Integumentary/Edema   Integumentary/Edema Comments RLE inflamed w/ redness from halway down shin to toes   Range of Motion (ROM)   ROM Comment Limited ROM to RLE. Able to slightly DF and PF   Strength  (Manual Muscle Testing)   Strength Comments Decreased strength to RLE d/t pain   Bed Mobility   Comment, (Bed Mobility) Supine to sitting EOB w/ Mod I   Transfers   Comment, (Transfers) Sit to stand w/ crutches and SBA   Gait/Stairs (Locomotion)   Comment, (Gait/Stairs) 10 ft w/ crutches and SBA   Balance   Balance Comments Occaisonal LOB w/ crutches but able to self correct   Clinical Impression   Criteria for Skilled Therapeutic Intervention Yes, treatment indicated   PT Diagnosis (PT) Impaired ambulation   Influenced by the following impairments Impaired strength, balance and activity tolerance   Functional limitations due to impairments Impaired ADLs, IADLs and functional mobility   Clinical Presentation (PT Evaluation Complexity) stable   Clinical Presentation Rationale Clinical judgment   Clinical Decision Making (Complexity) low complexity   Planned Therapy Interventions (PT) balance training;bed mobility training;gait training;home exercise program;patient/family education;stair training;strengthening;transfer training;progressive activity/exercise   Risk & Benefits of therapy have been explained evaluation/treatment results reviewed;care plan/treatment goals reviewed;risks/benefits reviewed;current/potential barriers reviewed;participants voiced agreement with care plan;participants included;patient   PT Total Evaluation Time   PT Eval, Low Complexity Minutes (61623) 10   Physical Therapy Goals   PT Frequency Daily   PT Predicted Duration/Target Date for Goal Attainment 01/12/25   PT Goals Bed Mobility;Transfers;Gait;Stairs   PT: Bed Mobility Independent;Supine to/from sit;Goal Met   PT: Transfers Modified independent;Sit to/from stand;Assistive device   PT: Gait Modified independent;Crutches;150 feet   PT: Stairs Modified independent;10 stairs;Rail on left;Assistive device   Interventions   Interventions Quick Adds Gait Training   Gait Training   Gait Training Minutes (60438) 15   Symptoms Noted  During/After Treatment (Gait Training) increased pain   Treatment Detail/Skilled Intervention Pt supine in bed at start of session. Pt agreeable to PT. Supine to sitting EOB w/ Mod I. Pt unable to get sock on RLE w/o assistance from writer. R foot not fitting in sandles in room. Pt completing sit to stand x3 during session w/ crutches and SBA. Crutch height adjusted during session. Ambulating ~150 ft x2 w/ crutches and SBA. Few episodes of LOB but mainly able to self correct, brief moment needing Min A x1. Frequently completing NWB to RLE, however, when cued able to occaisonally put small amount of weight down mainly only toes not placing heel down. Pt completing x9 stairs w/ one sided railing and one crutch w/ CGA progressing to SBA. Good sequencing on ascent, cued for sequencing on descent. No overt LOB noted. Supine in bed at end of session w/ all needs met and call light in place.   PT Discharge Planning   PT Plan Trial gait w/o AD, gait w/ crutches and WBing through RLE, review stairs   PT Discharge Recommendation (DC Rec) home with assist   PT Rationale for DC Rec Pt below baseline mobility. Limited by pain and swelling to RLE. Difficulty WBing through RLE at this time. Currently completing mobility w/ crutches and SBA. Anticipate when medically ready Pt can DC home w/ assist from spouse as needed. May need crutches at time of DC depending on mobility levels.   PT Brief overview of current status Goals of therapy will be to address safe mobility and make recs for d/c to next level of care. Pt and RN will continue to follow all falls risk precautions as documented by RN staff while hospitalized   Physical Therapy Time and Intention   Timed Code Treatment Minutes 15   Total Session Time (sum of timed and untimed services) 25

## 2025-01-02 NOTE — PLAN OF CARE
Goal Outcome Evaluation:      Plan of Care Reviewed With: patient           Date/Time 1/1/2025 [id number]    Trauma/Ortho/Medical (Choose one)  medical    Diagnosis:RLE cellulitis  Mental Status: A&OX4  Activity/dangle SBA to BSC  Diet: Regular-poor appetite.  Pain: tylenol, pain 2-3/10 temp of 101.1, down to 98.1  Farias/Voiding: BR, had episode of bowel incontinent x 1.   02/LDA:RA, IV infusing  D/C Date: TBD  Other Info: VSS, CMS intact. RLE red and swollen. Right heel blanchable redness. Intermittent nausea.

## 2025-01-02 NOTE — PLAN OF CARE
Diagnosis: Right leg cellulitis  Mental Status: A&O x4  Activity/dangle: SB ast  Diet: Regular diet  Pain: Managed with 1x PRN IV Dilaudid and PO Oxycodone.  Farias/Voiding: Voiding at bedside commode  Tele/Restraints/Iso: N/A  02/LDA: Room air. IV saline locked  D/C Date: TBD  Other Info: Mepilex on Right heel.

## 2025-01-02 NOTE — CONSULTS
"Provider added IP CM / SW IP Consult,    01/02/25 0902  Care Management / Social Work IP Consult  ONE TIME     Complete     Comments: Pcp follow up 1 week for cellulitis   Provider: (Not yet assigned)   Question: Reason for Consult Answer: Discharge Planning/Disposition          Pt has active MyChart with last login 1/2/25   Pt has upcoming apt scheduled in EPIC,     Jan 09, 2025 2:30 PM (Arrive by 2:15 PM)  ED/Hospital Follow Up with Sherif Bull PA-C  [facility] ([facility] ) [address] [place] [id number] [phone]   Please plan to arrive at the clinic at your \"Arrive By\" time for your appointment. Our late policy will be enforced based on this time.         No further care management intervention anticipated at this time.  Care management signing off.   Please re-consult if further needs arise.      Soo oJshi RN, BSN, PHN  [facility]  Inpatient Care Management - FLOAT      "

## 2025-01-02 NOTE — PLAN OF CARE
Diagnosis: R leg cellulitis   POD#: N/A  Mental Status:AxO x4  Activity/dangle SBA to BSC  Diet: Regular  Pain: x1 Oxy PO PRN  Farias/Voiding: BSC  Tele/Restraints/Iso: N/A  02/LDA: Room air, R AC IV SL  D/C Date: TBD  Other Info: Antibiotics Q8hr, R leg ACE wrap, CMS intact, R leg elevated on wedge. Mepilex on R heel due to blanchable redness. Zofran given for nausea.

## 2025-01-03 ENCOUNTER — APPOINTMENT (OUTPATIENT)
Dept: PHYSICAL THERAPY | Facility: CLINIC | Age: 35
DRG: 872 | End: 2025-01-03
Payer: COMMERCIAL

## 2025-01-03 LAB
ERYTHROCYTE [DISTWIDTH] IN BLOOD BY AUTOMATED COUNT: 13.1 % (ref 10–15)
HCT VFR BLD AUTO: 32.7 % (ref 35–47)
HGB BLD-MCNC: 10.9 G/DL (ref 11.7–15.7)
MCH RBC QN AUTO: 29.4 PG (ref 26.5–33)
MCHC RBC AUTO-ENTMCNC: 33.3 G/DL (ref 31.5–36.5)
MCV RBC AUTO: 88 FL (ref 78–100)
PLATELET # BLD AUTO: 221 10E3/UL (ref 150–450)
RBC # BLD AUTO: 3.71 10E6/UL (ref 3.8–5.2)
WBC # BLD AUTO: 10.3 10E3/UL (ref 4–11)

## 2025-01-03 PROCEDURE — 999N000128 HC STATISTIC PERIPHERAL IV START W/O US GUIDANCE

## 2025-01-03 PROCEDURE — 97116 GAIT TRAINING THERAPY: CPT | Mod: GP

## 2025-01-03 PROCEDURE — 250N000011 HC RX IP 250 OP 636: Performed by: INTERNAL MEDICINE

## 2025-01-03 PROCEDURE — 120N000001 HC R&B MED SURG/OB

## 2025-01-03 PROCEDURE — 250N000013 HC RX MED GY IP 250 OP 250 PS 637

## 2025-01-03 PROCEDURE — 85041 AUTOMATED RBC COUNT: CPT | Performed by: INTERNAL MEDICINE

## 2025-01-03 PROCEDURE — 250N000011 HC RX IP 250 OP 636: Mod: JZ

## 2025-01-03 PROCEDURE — 36415 COLL VENOUS BLD VENIPUNCTURE: CPT | Performed by: INTERNAL MEDICINE

## 2025-01-03 PROCEDURE — 85014 HEMATOCRIT: CPT | Performed by: INTERNAL MEDICINE

## 2025-01-03 PROCEDURE — 99232 SBSQ HOSP IP/OBS MODERATE 35: CPT | Performed by: INTERNAL MEDICINE

## 2025-01-03 RX ORDER — CEPHALEXIN 500 MG/1
500 CAPSULE ORAL 4 TIMES DAILY
Qty: 28 CAPSULE | Refills: 0 | Status: SHIPPED | OUTPATIENT
Start: 2025-01-03 | End: 2025-01-04

## 2025-01-03 RX ADMIN — ONDANSETRON 4 MG: 2 INJECTION, SOLUTION INTRAMUSCULAR; INTRAVENOUS at 12:00

## 2025-01-03 RX ADMIN — ENOXAPARIN SODIUM 40 MG: 40 INJECTION SUBCUTANEOUS at 08:38

## 2025-01-03 RX ADMIN — OXYCODONE HYDROCHLORIDE 5 MG: 5 TABLET ORAL at 02:43

## 2025-01-03 RX ADMIN — OXYCODONE HYDROCHLORIDE 5 MG: 5 TABLET ORAL at 21:11

## 2025-01-03 RX ADMIN — OXYCODONE HYDROCHLORIDE 5 MG: 5 TABLET ORAL at 07:02

## 2025-01-03 RX ADMIN — CEFAZOLIN SODIUM 2 G: 2 INJECTION, SOLUTION INTRAVENOUS at 01:07

## 2025-01-03 RX ADMIN — CEFAZOLIN SODIUM 2 G: 2 INJECTION, SOLUTION INTRAVENOUS at 08:38

## 2025-01-03 RX ADMIN — ACETAMINOPHEN 650 MG: 325 TABLET, FILM COATED ORAL at 12:01

## 2025-01-03 RX ADMIN — ONDANSETRON 4 MG: 2 INJECTION, SOLUTION INTRAMUSCULAR; INTRAVENOUS at 02:31

## 2025-01-03 RX ADMIN — PROCHLORPERAZINE MALEATE 10 MG: 10 TABLET ORAL at 05:10

## 2025-01-03 RX ADMIN — CEFAZOLIN SODIUM 2 G: 2 INJECTION, SOLUTION INTRAVENOUS at 17:37

## 2025-01-03 RX ADMIN — CLINDAMYCIN PHOSPHATE 900 MG: 900 INJECTION, SOLUTION INTRAVENOUS at 05:02

## 2025-01-03 ASSESSMENT — ACTIVITIES OF DAILY LIVING (ADL)
ADLS_ACUITY_SCORE: 38
ADLS_ACUITY_SCORE: 40
ADLS_ACUITY_SCORE: 38
ADLS_ACUITY_SCORE: 40
ADLS_ACUITY_SCORE: 38
ADLS_ACUITY_SCORE: 40
ADLS_ACUITY_SCORE: 38
ADLS_ACUITY_SCORE: 38
ADLS_ACUITY_SCORE: 40
ADLS_ACUITY_SCORE: 38
ADLS_ACUITY_SCORE: 40
ADLS_ACUITY_SCORE: 38
ADLS_ACUITY_SCORE: 40
ADLS_ACUITY_SCORE: 38
ADLS_ACUITY_SCORE: 38
ADLS_ACUITY_SCORE: 40

## 2025-01-03 NOTE — PLAN OF CARE
12/2 2300-0730     Diagnosis:R Leg cellulitis   Mental Status:A&Ox4  Activity/dangle: Standby assist to bedside commode   Diet:Regular   Pain: PRN Oxy   Farias/Voiding:Bedside commode   Tele/Restraints/Iso:NA  02/LDA:LUCAS STOLL  D/C Date:TBD  Other Info:Patient has new IV placed in left hand due to leakage on the Rt site. Antibiotics Q8, R leg ACE wrap, CMS intact, R leg elevated on wedge. Has a Mepilex on R heel due to blanchable redness. Compazine PO and Zofran IV given for nausea, pt stated oral Zofran does not work as effectively.

## 2025-01-04 VITALS
DIASTOLIC BLOOD PRESSURE: 71 MMHG | OXYGEN SATURATION: 95 % | TEMPERATURE: 98.2 F | WEIGHT: 159.61 LBS | BODY MASS INDEX: 25.65 KG/M2 | SYSTOLIC BLOOD PRESSURE: 117 MMHG | HEIGHT: 66 IN | HEART RATE: 83 BPM | RESPIRATION RATE: 16 BRPM

## 2025-01-04 LAB
ERYTHROCYTE [DISTWIDTH] IN BLOOD BY AUTOMATED COUNT: 13.1 % (ref 10–15)
HCT VFR BLD AUTO: 32.8 % (ref 35–47)
HGB BLD-MCNC: 11.2 G/DL (ref 11.7–15.7)
MCH RBC QN AUTO: 29.9 PG (ref 26.5–33)
MCHC RBC AUTO-ENTMCNC: 34.1 G/DL (ref 31.5–36.5)
MCV RBC AUTO: 88 FL (ref 78–100)
PLATELET # BLD AUTO: 216 10E3/UL (ref 150–450)
RBC # BLD AUTO: 3.74 10E6/UL (ref 3.8–5.2)
WBC # BLD AUTO: 8.2 10E3/UL (ref 4–11)

## 2025-01-04 PROCEDURE — 250N000011 HC RX IP 250 OP 636

## 2025-01-04 PROCEDURE — 85018 HEMOGLOBIN: CPT | Performed by: INTERNAL MEDICINE

## 2025-01-04 PROCEDURE — 250N000013 HC RX MED GY IP 250 OP 250 PS 637

## 2025-01-04 PROCEDURE — 36415 COLL VENOUS BLD VENIPUNCTURE: CPT | Performed by: INTERNAL MEDICINE

## 2025-01-04 PROCEDURE — 99239 HOSP IP/OBS DSCHRG MGMT >30: CPT | Performed by: INTERNAL MEDICINE

## 2025-01-04 RX ORDER — CEPHALEXIN 500 MG/1
500 CAPSULE ORAL 4 TIMES DAILY
Qty: 28 CAPSULE | Refills: 0 | Status: SHIPPED | OUTPATIENT
Start: 2025-01-04 | End: 2025-01-04

## 2025-01-04 RX ORDER — METHOCARBAMOL 500 MG/1
500 TABLET, FILM COATED ORAL 4 TIMES DAILY PRN
Qty: 45 TABLET | Refills: 0 | Status: SHIPPED | OUTPATIENT
Start: 2025-01-04

## 2025-01-04 RX ORDER — CEPHALEXIN 500 MG/1
500 CAPSULE ORAL 4 TIMES DAILY
Qty: 28 CAPSULE | Refills: 0 | Status: SHIPPED | OUTPATIENT
Start: 2025-01-04 | End: 2025-01-11

## 2025-01-04 RX ADMIN — ACETAMINOPHEN 650 MG: 325 TABLET, FILM COATED ORAL at 01:25

## 2025-01-04 RX ADMIN — CEFAZOLIN SODIUM 2 G: 2 INJECTION, SOLUTION INTRAVENOUS at 08:35

## 2025-01-04 RX ADMIN — ONDANSETRON 4 MG: 2 INJECTION, SOLUTION INTRAMUSCULAR; INTRAVENOUS at 09:50

## 2025-01-04 RX ADMIN — CEFAZOLIN SODIUM 2 G: 2 INJECTION, SOLUTION INTRAVENOUS at 01:19

## 2025-01-04 RX ADMIN — OXYCODONE HYDROCHLORIDE 5 MG: 5 TABLET ORAL at 01:25

## 2025-01-04 ASSESSMENT — ACTIVITIES OF DAILY LIVING (ADL)
ADLS_ACUITY_SCORE: 40

## 2025-01-04 NOTE — PLAN OF CARE
Physical Therapy Discharge Summary    Reason for therapy discharge:    All goals and outcomes met, no further needs identified.    Progress towards therapy goal(s). See goals on Care Plan in [facility] electronic health record for goal details.  Goals met    Therapy recommendation(s):    No further therapy is recommended.

## 2025-01-04 NOTE — PLAN OF CARE
Goal Outcome Evaluation:    Goal Outcome Evaluation:  Date/Time: 1/3/2025      Trauma/Ortho/Medical (Choose one) Medical     Diagnosis: R leg cellulitis  POD#: NA  Mental Status: A/O x4  Activity/dangle: SBA / crutches  Diet: Regular  Pain: Oxycodone and Tylenol given  Farias/Voiding: continent BR  Tele/Restraints/Iso:NA  02/LDA: RA /PIV sl  D/C Date: Possible today   Other Info: R leg elevated.

## 2025-01-04 NOTE — DISCHARGE SUMMARY
[facility]  Hospitalist Discharge Summary      Date of Admission:  12/31/2024  Date of Discharge:  1/4/2025  Discharging Provider: Ellen Braxton MD  Discharge Service: Hospitalist Service    Discharge Diagnoses   Cindi Padilla is a 34 year old female admitted on 12/31/2024 for evaluation of right leg pain, groin pain and lightheadedness. She has a past medical history of asthma and anxiety.       Sepsis  Severe /extensive right leg cellulitis suspect staph or strep  Ms. Padilla presents on 12/31/2024 for evaluation of right leg and groin pain as well as lightheadedness. She reports that her symptoms began at approximately 0640 on 12/30/2024. The pain has been radiating down to her knee. She exercised and did some stretching, but was unable to compete her workout due to lightheadedness. She endorses fever and when she awoke on 12/31/2024, she noticed that her right leg was red, swollen, painful and difficult to walk on. She has been nauseous and has vomited once. She reports that she previously had what she thought was an ingrown hair on her leg, which she treated and has since scabbed over. In ED, her heart rate is elevated at 120. She did receive a 1 liter fluid bolus in ED. Stat CT of her LE has been ordered and vancomycin has been ordered. Plan is as follows:   *Lactic acid 1.6  *WBC 21.0  *hcG:<1  *CK total: 55  *CT of the right leg:  No evidence for osteomyelitis, septic arthropathy, or abscess. Nonspecific edema or cellulitis within the subcutaneous soft tissues of the lower leg just above the ankle. Additional wispy edema or cellulitis within the anteromedial aspect of the proximal thigh where there is some reactive lymphadenopathy. No evidence for foreign body. No subcutaneous gas. Exam otherwise negative.  -Blood cultures obtained in ED-no growth to date  -Vancomycin stopped given MRSA nares negative.  Ceftriaxone changed to cefazolin.  -suspect staph or strep-likely strep.  CT imaging  shows no clear deep infection.  There was some mild purulence at the site of where she removed an ingrown hair.  -Given her SIRS and persistent leukocytosis clindamycin added to cefazolin to target toxin and its anti-inflammatory effects.  -White blood cell count trending down from 20 to10  -She has had steady improvement daily in her appearance of her rash and level of discomfort along with her leukocytosis.  Despite normalization of her white blood cell count she still has a large area of redness and induration therefore I think she needs another 24 hours of IV antibiotics  -Patient was needing oxycodone overnight but this was for headache which is now resolved not for her right lower extremity pain  -I suspect this is a strep cellulitis-severe     Plan-  -stop clindamycin  -continue cefazolin 2 g every 8 hours.   -Right lower extremity leg elevation.  Transition to compression stockings at discharge and the leg elevation at home   -Oxycodone 2.5-5 mg for moderate to severe pain   -Seen by physical therapy crutches provided if needed.  Patient does have ball at her heel which is tender with foot plant therefore may need crutches for several days.  -Needs another 24 hours of IV antibiotics.  Anticipate discharge home tomorrow-discharge orders completed  -pcp follow up 1/9/25     Cellulitis much improved on 1/4/2025.  Patient wants to discharge home on oral Keflex  She requested Robaxin for pain control which was prescribed on discharge        Acute kidney injury-resolved  prerenal. Cr down to 0.88 with fluid boluses and mant fluids.   Now taking po with improved appetite   Prn bmp      Asthma-  -no clear flare  -Resume Advair inhaler 1 puff every 12 hours  -Resume PTA albuterol 2 puffs q6h as needed for dyspnea and/or wheezing     Depression/anxiety  -Reports that she is no longer taking PTA sertraline  -Monitor for signs/symptoms of anxiety/depression        Diet: Regular diet  DVT Prophylaxis: Pneumatic  "Compression Devices  Farias Catheter: Not present  Lines: None     Cardiac Monitoring: None  Code Status:  Full code     DVT Prophylaxis: Patient ambulatory  Code Status: Full Code     Disposition: Dissipate discharge home  Medically Ready for Discharge: Anticipated Tomorrow pending continued improvement in right lower extremity cellulitic rash.     Clinically Significant Risk Factors                               # Overweight: Estimated body mass index is 25.76 kg/m  as calculated from the following:    Height as of this encounter: 1.676 m (5' 6\").    Weight as of this encounter: 72.4 kg (159 lb 9.8 oz)., PRESENT ON ADMISSION     # Asthma: noted on problem list            Medically Ready for Discharge: Ready Now           Ellen Braxton MD, MD     Available through [employer]       Clinically Significant Risk Factors     # Overweight: Estimated body mass index is 25.76 kg/m  as calculated from the following:    Height as of this encounter: 1.676 m (5' 6\").    Weight as of this encounter: 72.4 kg (159 lb 9.8 oz).       Follow-ups Needed After Discharge   Follow-up Appointments       Hospital Follow-up with Existing Primary Care Provider (PCP)      Please see details below         Schedule Primary Care visit within: 3-5 Days (Urgent)               Unresulted Labs Ordered in the Past 30 Days of this Admission       Date and Time Order Name Status Description    12/31/2024  7:39 AM Blood Culture Peripheral Blood Preliminary     12/31/2024  7:39 AM Blood Culture Peripheral Blood Preliminary             Discharge Disposition   Discharged to home  Condition at discharge: Stable        Consultations This Hospital Stay   PHARMACY TO DOSE VANCO  PHARMACY TO DOSE VANCO  PHYSICAL THERAPY ADULT IP CONSULT  OCCUPATIONAL THERAPY ADULT IP CONSULT  CARE MANAGEMENT / SOCIAL WORK IP CONSULT  PHYSICAL THERAPY ADULT IP CONSULT  VASCULAR ACCESS ADULT IP CONSULT  VASCULAR ACCESS ADULT IP CONSULT    Code Status   Full Code    Time Spent on this " Encounter   I, Ellen Braxton MD, personally saw the patient today and spent greater than 30 minutes discharging this patient.       Ellen Braxton MD  [facility]  [address] [place] [facility] [address]  Phone: [phone]  Fax: [phone]  ______________________________________________________________________    Physical Exam   Vital Signs: Temp: 98.2  F (36.8  C) Temp src: Oral BP: 117/71 Pulse: 83   Resp: 16 SpO2: 95 % O2 Device: None (Room air)    Weight: 159 lbs 9.81 oz  Constitutional: Resting comfortably in bed, in good spirits, not in acute distress  Respiratory:     CTAB  Cardiovascular: Regular rate and rhythm no rubs gallops or murmurs   GI: Soft nontender nondistended  Skin/Integumen: Right lower extremity cellulitic rash clearly improved again today.  Proximal area of redness markedly improved in intensity.  Confluent, circumferential right lower extremity rash below the knee continues to receded and less red in intensity.  There is a blister on the back of the heel which is covered with Mepilex from the cellulitis             Primary Care Physician   Sherif Bull    Discharge Orders      Reason for your hospital stay    Staph/strep R leg cellulitis, suspect strep infection. MRSA nares test negative     Activity    Your activity upon discharge: activity as tolerated  Use crutches as needed.     Discharge Instructions    Keep Right leg elevated during night and during day for next several days  Wear compression stocking right leg if able (so as not to disrupt blister right heel)     Full Code     Crutches Order for DME - ONLY FOR DME     Diet    Follow this diet upon discharge: Current Diet:Orders Placed This Encounter      Regular Diet Adult     Hospital Follow-up with Existing Primary Care Provider (PCP)    Please see details below            Significant Results and Procedures   Most Recent 3 CBC's:  Recent Labs   Lab Test 01/04/25  0808 01/03/25  0840  01/02/25  0741   WBC 8.2 10.3 14.7*   HGB 11.2* 10.9* 11.3*   MCV 88 88 88    221 202     Most Recent 3 BMP's:  Recent Labs   Lab Test 01/02/25  0741 01/01/25  0848 12/31/24  0758    134* 135   POTASSIUM 3.8 3.6 4.0   CHLORIDE 106 102 100   CO2 23 20* 23   BUN 3.9* 9.3 15.2   CR 0.88 0.95 1.15*   ANIONGAP 9 12 12   CHRIS 8.4* 8.6* 9.2   * 150* 113*     Most Recent 2 LFT's:  Recent Labs   Lab Test 12/31/24  0758 07/14/23  1637   AST 17 26   ALT 11 11   ALKPHOS 67 58   BILITOTAL 0.5 0.4     Most Recent 3 INR's:No lab results found.  Most Recent INR's and Anticoagulation Dosing History:  Anticoagulation Dose History           No data to display              Most Recent 3 Creatinines:  Recent Labs   Lab Test 01/02/25  0741 01/01/25  0848 12/31/24  0758   CR 0.88 0.95 1.15*     Most Recent 3 Hemoglobins:  Recent Labs   Lab Test 01/04/25  0808 01/03/25  0840 01/02/25  0741   HGB 11.2* 10.9* 11.3*     Most Recent 3 Troponin's:  Recent Labs   Lab Test 02/25/18 2007   TROPI <0.015     Most Recent 3 BNP's:No lab results found.  Most Recent D-dimer:  Recent Labs   Lab Test 02/25/18 2007   DD 0.3     Most Recent Cholesterol Panel:  Recent Labs   Lab Test 12/11/23  0740   CHOL 146   LDL 82   HDL 46*   TRIG 92     7-Day Micro Results       Collected Updated Procedure Result Status      01/01/2025 0012 01/01/2025 0423 MRSA MSSA PCR, Nasal Swab [[id number]]    Swab from Nares, Bilateral    Final result Component Value   MRSA Target DNA Negative   SA Target DNA Negative            12/31/2024 0853 01/04/2025 1046 Blood Culture Peripheral Blood [[id number]]   Peripheral Blood    Preliminary result Component Value   Culture No growth after 4 days  [P]                12/31/2024 0758 01/04/2025 1046 Blood Culture Peripheral Blood [[id number]]   Peripheral Blood    Preliminary result Component Value   Culture No growth after 4 days  [P]                      Most Recent TSH and T4:  Recent Labs   Lab Test  07/14/23  1637   TSH 2.19     Most Recent Hemoglobin A1c:No lab results found.  Most Recent 6 glucoses:  Recent Labs   Lab Test 01/02/25  0741 01/01/25  0848 12/31/24  0758 07/14/23  1637 12/08/22  0746 08/31/21  1641   * 150* 113* 90 96 75     Most Recent Urinalysis:No lab results found.  Most Recent ABG:No lab results found.  Most Recent ESR & CRP:No lab results found.  Most Recent Anemia Panel:  Recent Labs   Lab Test 01/04/25  0808   WBC 8.2   HGB 11.2*   HCT 32.8*   MCV 88        Most Recent CPK:  Recent Labs   Lab Test 12/31/24  0758   CKT 55   ,   Results for orders placed or performed during the hospital encounter of 12/31/24   CT Tibia Fibula Lower Leg Right w Contrast    Narrative    CT TIBIA FIBULA LOWER LEG RIGHT W CONTRAST 12/31/2024 11:35 AM    INDICATION: Right lower leg cellulitis with rapid onset and pain,  concern for abscess vs fasciitis.    COMPARISON: None.    CONTRAST: 90mL Isovue-370    TECHNIQUE: IV contrast. Axial, sagittal and coronal thin-section  reconstruction. Dose reduction techniques were used.     FINDINGS:   The right hip is negative for fracture or CT evidence of avascular  necrosis. The right femur is negative. The right tibia and fibula are  negative. The right ankle mortise is negative.    No significant hip, knee, or ankle joint effusion.    Minimal edema within the subcutaneous soft tissues along the  anteromedial aspect of the proximal thigh. There is some  lymphadenopathy, likely reactive. No evidence for organized mass or  fluid collection. There is some additional edema or cellulitis within  the subcutaneous soft tissues of the lower leg just above the ankle.  No subcutaneous gas. No foreign body.      Impression    IMPRESSION:  1.  No evidence for osteomyelitis, septic arthropathy, or abscess.  2.  Nonspecific edema or cellulitis within the subcutaneous soft  tissues of the lower leg just above the ankle.  3.  Additional wispy edema or cellulitis within the  anteromedial  aspect of the proximal thigh where there is some reactive  lymphadenopathy.  4.  No evidence for foreign body.  5.  No subcutaneous gas.  6.  Exam otherwise negative.      AMADO SANTANA MD         SYSTEM ID:  [id number]       Discharge Medications   Discharge Medication List as of 1/4/2025 10:15 AM        START taking these medications    Details   methocarbamol (ROBAXIN) 500 MG tablet Take 1 tablet (500 mg) by mouth 4 times daily as needed for muscle spasms or other (leg pain)., Disp-45 tablet, R-0, E-Prescribe           CONTINUE these medications which have CHANGED    Details   cephALEXin (KEFLEX) 500 MG capsule Take 1 capsule (500 mg) by mouth 4 times daily., Disp-28 capsule, R-0, E-Prescribe           CONTINUE these medications which have NOT CHANGED    Details   albuterol (PROAIR HFA/PROVENTIL HFA/VENTOLIN HFA) 108 (90 Base) MCG/ACT inhaler Inhale 2 puffs into the lungs every 6 hours., Disp-18 g, R-1, E-PrescribePharmacy may dispense brand covered by insurance (Proair, or proventil or ventolin or generic albuterol inhaler)      fluticasone-salmeterol (ADVAIR) 250-50 MCG/ACT inhaler Inhale 1 puff into the lungs daily as needed (short of breath, allergies)., Historical      loratadine (CLARITIN) 10 MG tablet Take 10 mg by mouth daily as needed for allergies., Historical      multivitamin, therapeutic (THERA-VIT) TABS tablet Take 1 tablet by mouth daily., Historical           STOP taking these medications       sertraline (ZOLOFT) 25 MG tablet Comments:   Reason for Stopping:             Allergies   No Known Allergies

## 2025-01-04 NOTE — PLAN OF CARE
Date/Time: 01/03/2025  Diagnosis: Right leg cellulitis  Mental Status: A&O x4  Activity/dangle: SB Ast w/ crutches  Diet: Regular diet  Pain: Managed with PRN Oxycodone  Farias/Voiding: Voiding in the bathroom  Tele/Restraints/Iso: N/A  02/LDA: Room air. IV saline locked  D/C Date: TBD  Other Info: Mepilex on Right heel blister. Given 1x IV Zofran for nausea.

## 2025-01-04 NOTE — PROGRESS NOTES
[facility]    Hospitalist Progress Note    Assessment & Plan   Date of Admission:  12/31/2024        Assessment & Plan  Cindi Padilla is a 34 year old female admitted on 12/31/2024 for evaluation of right leg pain, groin pain and lightheadedness. She has a past medical history of asthma and anxiety.       Sepsis  Severe /extensive right leg cellulitis suspect staph or strep  Ms. Padilla presents on 12/31/2024 for evaluation of right leg and groin pain as well as lightheadedness. She reports that her symptoms began at approximately 0640 on 12/30/2024. The pain has been radiating down to her knee. She exercised and did some stretching, but was unable to compete her workout due to lightheadedness. She endorses fever and when she awoke on 12/31/2024, she noticed that her right leg was red, swollen, painful and difficult to walk on. She has been nauseous and has vomited once. She reports that she previously had what she thought was an ingrown hair on her leg, which she treated and has since scabbed over. In ED, her heart rate is elevated at 120. She did receive a 1 liter fluid bolus in ED. Stat CT of her LE has been ordered and vancomycin has been ordered. Plan is as follows:   *Lactic acid 1.6  *WBC 21.0  *hcG:<1  *CK total: 55  *CT of the right leg:  No evidence for osteomyelitis, septic arthropathy, or abscess. Nonspecific edema or cellulitis within the subcutaneous soft tissues of the lower leg just above the ankle. Additional wispy edema or cellulitis within the anteromedial aspect of the proximal thigh where there is some reactive lymphadenopathy. No evidence for foreign body. No subcutaneous gas. Exam otherwise negative.  -Blood cultures obtained in ED-no growth to date  -Vancomycin stopped given MRSA nares negative.  Ceftriaxone changed to cefazolin.  -suspect staph or strep-likely strep.  CT imaging shows no clear deep infection.  There was some mild purulence at the site of where she  removed an ingrown hair.  -Given her SIRS and persistent leukocytosis clindamycin added to cefazolin to target toxin and its anti-inflammatory effects.  -White blood cell count trending down from 20 to10  -She has had steady improvement daily in her appearance of her rash and level of discomfort along with her leukocytosis.  Despite normalization of her white blood cell count she still has a large area of redness and induration therefore I think she needs another 24 hours of IV antibiotics  -Patient was needing oxycodone overnight but this was for headache which is now resolved not for her right lower extremity pain  -I suspect this is a strep cellulitis-severe    Plan-  -stop clindamycin  -continue cefazolin 2 g every 8 hours.   -Right lower extremity leg elevation.  Transition to compression stockings at discharge and the leg elevation at home   -Oxycodone 2.5-5 mg for moderate to severe pain   -Seen by physical therapy crutches provided if needed.  Patient does have ball at her heel which is tender with foot plant therefore may need crutches for several days.  -Needs another 24 hours of IV antibiotics.  Anticipate discharge home tomorrow-discharge orders completed  -pcp follow up 1/9/25    Cellulitis much improved on 1/4/2025.  Patient wants to discharge home on oral Keflex  She requested Robaxin for pain control which was prescribed on discharge       Acute kidney injury-resolved  prerenal. Cr down to 0.88 with fluid boluses and mant fluids.   Now taking po with improved appetite   Prn bmp     Asthma-  -no clear flare  -Resume Advair inhaler 1 puff every 12 hours  -Resume PTA albuterol 2 puffs q6h as needed for dyspnea and/or wheezing     Depression/anxiety  -Reports that she is no longer taking PTA sertraline  -Monitor for signs/symptoms of anxiety/depression        Diet: Regular diet  DVT Prophylaxis: Pneumatic Compression Devices  Farias Catheter: Not present  Lines: None     Cardiac Monitoring: None  Code  "Status:  Full code    DVT Prophylaxis: Patient ambulatory  Code Status: Full Code    Disposition: Dissipate discharge home  Medically Ready for Discharge: Anticipated Tomorrow pending continued improvement in right lower extremity cellulitic rash.  Clinically Significant Risk Factors                              # Overweight: Estimated body mass index is 25.76 kg/m  as calculated from the following:    Height as of this encounter: 1.676 m (5' 6\").    Weight as of this encounter: 72.4 kg (159 lb 9.8 oz)., PRESENT ON ADMISSION     # Asthma: noted on problem list        Medically Ready for Discharge: Ready Now        Ellen Braxton MD, MD    Available through [email or web address]  Interval History   Chart reviewed.  Discussed with bedside RN    Patient is resting comfortably in bed.  Right lower extremity cellulitis much improved.  Patient wants to discharge home today.  Requested Robaxin for pain control on discharge.  She has outpatient for PCP follow-up already scheduled.  No other acute issues      -Data reviewed today: I reviewed all new labs and imaging results over the last 24 hours. I personally reviewed laboratory studies and imaging studies since admission    Physical Exam   Temp: 98.2  F (36.8  C) Temp src: Oral BP: 117/71 Pulse: 83   Resp: 16 SpO2: 95 % O2 Device: None (Room air)    Vitals:    01/01/25 0621 01/02/25 0545 01/04/25 0632   Weight: 71.8 kg (158 lb 4.8 oz) 73.5 kg (162 lb 0.6 oz) 72.4 kg (159 lb 9.8 oz)     Vital Signs with Ranges  Temp:  [97.4  F (36.3  C)-98.2  F (36.8  C)] 98.2  F (36.8  C)  Pulse:  [83-98] 83  Resp:  [16] 16  BP: (107-117)/(65-73) 117/71  SpO2:  [95 %-100 %] 95 %  No intake/output data recorded.    Constitutional: Nad, looks better today. Less flushed, smiling   Respiratory: CTAB  Cardiovascular: Regular rate and rhythm no rubs gallops or murmurs appreciated slightly tachycardic  GI: Soft nontender nondistended  Skin/Integumen: Right lower extremity cellulitic rash clearly improved again " today.  Proximal area of redness markedly improved in intensity.  Confluent, circumferential right lower extremity rash below the knee continues to receded and less red in intensity.       Medications   Current Facility-Administered Medications   Medication Dose Route Frequency Provider Last Rate Last Admin     Current Facility-Administered Medications   Medication Dose Route Frequency Provider Last Rate Last Admin    albuterol (PROVENTIL HFA/VENTOLIN HFA) inhaler  2 puff Inhalation Q6H Tayo Keating NP        ceFAZolin (ANCEF) 2 g in 100 mL D5W intermittent infusion  2 g Intravenous Q8H Tayo Keating  mL/hr at 01/04/25 0835 2 g at 01/04/25 0835    fluticasone-vilanterol (BREO ELLIPTA) 100-25 MCG/ACT inhaler 1 puff  1 puff Inhalation Daily Tayo Keating NP        sennosides (SENOKOT) tablet 8.6 mg  8.6 mg Oral Daily Thien Perez MD           Data   Recent Labs   Lab 01/04/25  0808 01/03/25  0840 01/02/25  0741 01/01/25  0848 12/31/24  0758   WBC 8.2 10.3 14.7* 20.9* 21.0*   HGB 11.2* 10.9* 11.3* 12.0 13.1   MCV 88 88 88 88 87    221 202 217 230   NA  --   --  138 134* 135   POTASSIUM  --   --  3.8 3.6 4.0   CHLORIDE  --   --  106 102 100   CO2  --   --  23 20* 23   BUN  --   --  3.9* 9.3 15.2   CR  --   --  0.88 0.95 1.15*   ANIONGAP  --   --  9 12 12   CHRIS  --   --  8.4* 8.6* 9.2   GLC  --   --  109* 150* 113*   ALBUMIN  --   --   --   --  3.9   PROTTOTAL  --   --   --   --  7.4   BILITOTAL  --   --   --   --  0.5   ALKPHOS  --   --   --   --  67   ALT  --   --   --   --  11   AST  --   --   --   --  17       Imaging:   No results found for this or any previous visit (from the past 24 hours).

## 2025-01-04 NOTE — PLAN OF CARE
Diagnosis: Right leg cellulitis  Mental Status: A&O x4  Activity/dangle: SB Ast w/ Walker  Diet: Regular diet  Pain: Minimum pain noted no PRN requested  Farias/Voiding: Voiding in the bathroom  Tele/Restraints/Iso: N/A  02/LDA: Room air. PIV removed  Other Info: Mepilex on Right heel blister. Given 1x IV Zofran for nausea. Went over AVS and discharge medications. Discharge home with ride from spouse.

## 2025-01-05 LAB
BACTERIA BLD CULT: NO GROWTH
BACTERIA BLD CULT: NO GROWTH

## 2025-01-06 ENCOUNTER — PATIENT OUTREACH (OUTPATIENT)
Dept: FAMILY MEDICINE | Facility: CLINIC | Age: 35
End: 2025-01-06
Payer: COMMERCIAL

## 2025-01-07 ENCOUNTER — OFFICE VISIT (OUTPATIENT)
Dept: FAMILY MEDICINE | Facility: CLINIC | Age: 35
End: 2025-01-07
Payer: COMMERCIAL

## 2025-01-07 VITALS
SYSTOLIC BLOOD PRESSURE: 104 MMHG | HEART RATE: 92 BPM | OXYGEN SATURATION: 97 % | BODY MASS INDEX: 24.86 KG/M2 | DIASTOLIC BLOOD PRESSURE: 60 MMHG | WEIGHT: 154 LBS | TEMPERATURE: 97.5 F

## 2025-01-07 DIAGNOSIS — L03.115 CELLULITIS OF RIGHT LOWER EXTREMITY: Primary | ICD-10-CM

## 2025-01-07 DIAGNOSIS — N17.9 AKI (ACUTE KIDNEY INJURY): ICD-10-CM

## 2025-01-07 LAB
ANION GAP SERPL CALCULATED.3IONS-SCNC: 11 MMOL/L (ref 7–15)
BUN SERPL-MCNC: 10.6 MG/DL (ref 6–20)
CALCIUM SERPL-MCNC: 9.8 MG/DL (ref 8.8–10.4)
CHLORIDE SERPL-SCNC: 102 MMOL/L (ref 98–107)
CREAT SERPL-MCNC: 0.78 MG/DL (ref 0.51–0.95)
EGFRCR SERPLBLD CKD-EPI 2021: >90 ML/MIN/1.73M2
ERYTHROCYTE [DISTWIDTH] IN BLOOD BY AUTOMATED COUNT: 12.6 % (ref 10–15)
GLUCOSE SERPL-MCNC: 88 MG/DL (ref 70–99)
HCO3 SERPL-SCNC: 26 MMOL/L (ref 22–29)
HCT VFR BLD AUTO: 36.2 % (ref 35–47)
HGB BLD-MCNC: 12.1 G/DL (ref 11.7–15.7)
MCH RBC QN AUTO: 30.2 PG (ref 26.5–33)
MCHC RBC AUTO-ENTMCNC: 33.4 G/DL (ref 31.5–36.5)
MCV RBC AUTO: 90 FL (ref 78–100)
PLATELET # BLD AUTO: 426 10E3/UL (ref 150–450)
POTASSIUM SERPL-SCNC: 4.3 MMOL/L (ref 3.4–5.3)
RBC # BLD AUTO: 4.01 10E6/UL (ref 3.8–5.2)
SODIUM SERPL-SCNC: 139 MMOL/L (ref 135–145)
WBC # BLD AUTO: 9.3 10E3/UL (ref 4–11)

## 2025-01-07 PROCEDURE — 85027 COMPLETE CBC AUTOMATED: CPT | Performed by: FAMILY MEDICINE

## 2025-01-07 PROCEDURE — 99496 TRANSJ CARE MGMT HIGH F2F 7D: CPT | Performed by: FAMILY MEDICINE

## 2025-01-07 PROCEDURE — 80048 BASIC METABOLIC PNL TOTAL CA: CPT | Performed by: FAMILY MEDICINE

## 2025-01-07 PROCEDURE — 36415 COLL VENOUS BLD VENIPUNCTURE: CPT | Performed by: FAMILY MEDICINE

## 2025-01-07 ASSESSMENT — PAIN SCALES - GENERAL: PAINLEVEL_OUTOF10: MILD PAIN (2)

## 2025-01-07 NOTE — TELEPHONE ENCOUNTER
Patient had visit with provider 1/7 for hosp f/up. No additional follow up needed.    Andria Parish RN

## 2025-01-07 NOTE — PROGRESS NOTES
Assessment & Plan     Cellulitis of right lower extremity  Patient has severe cellulitis in the right lower extremity.  S/p hospitalization IV antibiotic currently on Keflex.  She is overall feeling better swelling is still present she is keeping it elevated.  Some redness on the lower part of the feet but it is shrinking.  Denies any fever or chills at this time advised to continue Keflex warning signs were discussed.  If any redness worsening or any pain is worsening or swelling is worsening she needs to report back immediately otherwise I suggested her to follow-up in the next 1 week again to make sure it is continue to improve.  If there is any change in symptoms including fever chills or any swelling which is worsening she needs to let us know or go to the ER for reevaluation.  - CBC with platelets; Future  - Basic metabolic panel  (Ca, Cl, CO2, Creat, Gluc, K, Na, BUN); Future  - CBC with platelets  - Basic metabolic panel  (Ca, Cl, CO2, Creat, Gluc, K, Na, BUN)    DESTIN (acute kidney injury) (H)  Elevation of creatinine will check labs and follow-up on that.  - CBC with platelets; Future  - Basic metabolic panel  (Ca, Cl, CO2, Creat, Gluc, K, Na, BUN); Future  - CBC with platelets  - Basic metabolic panel  (Ca, Cl, CO2, Creat, Gluc, K, Na, BUN)        MED REC REQUIRED  Post Medication Reconciliation Status: discharge medications reconciled, continue medications without change        Sally Puente is a 34 year old, presenting for the following health issues:  Hospital F/U        1/7/2025     9:54 AM   Additional Questions   Roomed by Blake MCNEIL     [facility] [place]         Hospital Follow-up Visit:    Hospital/Nursing Home/IP Rehab Facility: [facility]  Date of Admission: 12/31  Date of Discharge: 1/4  Reason(s) for Admission: right leg pain/swelling, lightheadedness  Was the patient in the ICU or did the patient experience delirium during hospitalization?  No  Do you have any other stressors  you would like to discuss with your provider? No    Problems taking medications regularly:  None  Medication changes since discharge: None  Problems adhering to non-medication therapy:  None    Summary of hospitalization:  [facility] discharge summary reviewed  Diagnostic Tests/Treatments reviewed.  Follow up needed: none  Other Healthcare Providers Involved in Patient s Care:         None  Update since discharge: stable.     Plan of care communicated with patient  Patient was admitted in the hospital for 4 days initially secondary to some redness in the leg which was deemed to be cellulitis she was initially on IV antibiotics and later due to response to the IV antibiotics switched to Keflex.  She needs to take that medication for another 5 days.  She feels her redness has improved however there is still some swelling in the leg as well as in the feet.  Her pain is much controlled no fevers for the last 2 days.  Denies any chest pains no shortness of breath.  She overall feels somewhat stable but still have swelling in the right leg.      Review of Systems  Constitutional, HEENT, cardiovascular, pulmonary, gi and gu systems are negative, except as otherwise noted.      Objective    /60   Pulse 92   Temp 97.5  F (36.4  C) (Tympanic)   Wt 69.9 kg (154 lb)   SpO2 97%   BMI 24.86 kg/m    Body mass index is 24.86 kg/m .  Physical Exam   GENERAL: alert and no distress  NECK: no adenopathy, no asymmetry, masses, or scars  RESP: lungs clear to auscultation - no rales, rhonchi or wheezes  CV: regular rate and rhythm, normal S1 S2, no S3 or S4, no murmur, click or rub, no peripheral edema  MS: no gross musculoskeletal defects noted, no edema  Right leg swelling noted.  There is small blisters in the back of the leg area marked on the leg reviewed redness has decreased on that some swelling in the feet still present.  No calf tenderness.            Signed Electronically by: Homer Swain MD

## 2025-01-13 ENCOUNTER — OFFICE VISIT (OUTPATIENT)
Dept: FAMILY MEDICINE | Facility: CLINIC | Age: 35
End: 2025-01-13
Payer: COMMERCIAL

## 2025-01-13 VITALS
TEMPERATURE: 97 F | WEIGHT: 151.6 LBS | OXYGEN SATURATION: 97 % | RESPIRATION RATE: 16 BRPM | DIASTOLIC BLOOD PRESSURE: 79 MMHG | SYSTOLIC BLOOD PRESSURE: 111 MMHG | HEART RATE: 94 BPM | BODY MASS INDEX: 25.26 KG/M2 | HEIGHT: 65 IN

## 2025-01-13 DIAGNOSIS — L03.115 CELLULITIS OF RIGHT LOWER EXTREMITY: Primary | ICD-10-CM

## 2025-01-13 PROCEDURE — 99213 OFFICE O/P EST LOW 20 MIN: CPT | Performed by: PHYSICIAN ASSISTANT

## 2025-01-13 RX ORDER — COPPER 313.4 MG/1
INTRAUTERINE DEVICE INTRAUTERINE
COMMUNITY
Start: 2023-12-21

## 2025-01-13 ASSESSMENT — PAIN SCALES - GENERAL: PAINLEVEL_OUTOF10: NO PAIN (1)

## 2025-01-13 NOTE — PROGRESS NOTES
"  Assessment & Plan         1. Cellulitis of right lower extremity (Primary)  Healing, improved.  Advised to continue with compression and leg elevation.  Avoid swimming etc until skin fully healed. Monitor for new symptoms.           Follow up as needed    Subjective   Cindi is a 34 year old, presenting for the following health issues:  Follow Up (Cellulitis- Right lower leg )        1/13/2025    11:07 AM   Additional Questions   Roomed by Maria D MCNEIL         1/13/2025   Forms   Any forms needing to be completed Yes     History of Present Illness       Reason for visit:  Cellulitis recheck    She eats 2-3 servings of fruits and vegetables daily.She consumes 1 sweetened beverage(s) daily.She exercises with enough effort to increase her heart rate 20 to 29 minutes per day.  She exercises with enough effort to increase her heart rate 5 days per week.   She is taking medications regularly.    Cindi was admitted  12/31 for RLE cellulitis and DESTIN.   She was  initially treated with vancomycin and then started on clindamycin and cefazolin.  She was later discharged on Keflex and finished this course.      She is back at work today, wearing compression stocking and elevating her leg when possible.  Overall, she has healing blistering on her calf and heel.  She has some new tissue formation.  Continues to have some swelling over the ankle and foot, but overall this has improved.  She is not longer having pain, fevers or other constitutional symptoms        Review of Systems  Constitutional, HEENT, cardiovascular, pulmonary, GI, , musculoskeletal, neuro, skin, endocrine and psych systems are negative, except as otherwise noted.      Objective    /79 (BP Location: Left arm, Patient Position: Sitting, Cuff Size: Adult Large)   Pulse 94   Temp 97  F (36.1  C) (Tympanic)   Resp 16   Ht 1.661 m (5' 5.39\")   Wt 68.8 kg (151 lb 9.6 oz)   SpO2 97%   BMI 24.92 kg/m    Body mass index is 24.92 kg/m .  Physical Exam "   GENERAL: alert and no distress  SKIN:  right LE with mild edema below knee, most marked at the ankle and dorsum of the foot.  There is some healing superficial breakdown on the heal and calf, both with new overlying skin growth, no oozing/drainage noted.   PSYCH: mentation appears normal, affect normal/bright            Signed Electronically by: Sherif Bull PA-C

## 2025-01-21 ENCOUNTER — LAB REQUISITION (OUTPATIENT)
Dept: LAB | Facility: CLINIC | Age: 35
End: 2025-01-21

## 2025-01-21 DIAGNOSIS — Z80.41 FAMILY HISTORY OF MALIGNANT NEOPLASM OF OVARY: ICD-10-CM

## 2025-01-21 PROCEDURE — 86304 IMMUNOASSAY TUMOR CA 125: CPT | Performed by: OBSTETRICS & GYNECOLOGY

## 2025-01-22 LAB — CANCER AG125 SERPL-ACNC: 12 U/ML

## (undated) DEVICE — LIGHT HANDLE X2

## (undated) DEVICE — GLOVE PROTEXIS MICRO 7.0  2D73PM70

## (undated) DEVICE — PACK C-SECTION LF PL15OTA83B

## (undated) DEVICE — STPL SKIN PROXIMATE 35 WIDE PMW35

## (undated) DEVICE — LINEN TOWEL PACK X5 5464

## (undated) DEVICE — SOL NACL 0.9% IRRIG 1000ML BOTTLE 07138-09

## (undated) DEVICE — PREP CHLORAPREP 26ML TINTED ORANGE  260815

## (undated) DEVICE — CATH TRAY FOLEY 16FR BARDEX W/DRAIN BAG STATLOCK 300316A

## (undated) DEVICE — DRAPE SHEET REV FOLD 3/4 9349

## (undated) DEVICE — SU PDS II 0 CT 36" Z358T

## (undated) DEVICE — ESU GROUND PAD UNIVERSAL W/O CORD

## (undated) DEVICE — SOL WATER IRRIG 1000ML BOTTLE 07139-09

## (undated) DEVICE — SUCTION CANISTER MEDIVAC LINER 3000ML W/LID 65651-530

## (undated) DEVICE — PAD CHUX UNDERPAD 23X24" 7136

## (undated) DEVICE — BLADE CLIPPER 4406

## (undated) DEVICE — SU MONOCRYL 4-0 PS-2 18" UND Y496G

## (undated) DEVICE — LINEN C-SECTION 5415

## (undated) DEVICE — LINEN BABY BLANKET 5434

## (undated) DEVICE — SU VICRYL 0 CT 36" J358H

## (undated) DEVICE — DRSG TELFA ISLAND 4X10"

## (undated) DEVICE — DRSG STERI STRIP 1/2X4" R1547

## (undated) DEVICE — DRSG KERLIX FLUFFS X5

## (undated) DEVICE — PACK SET-UP STD 9102

## (undated) RX ORDER — ONDANSETRON 2 MG/ML
INJECTION INTRAMUSCULAR; INTRAVENOUS
Status: DISPENSED
Start: 2022-09-29

## (undated) RX ORDER — KETOROLAC TROMETHAMINE 30 MG/ML
INJECTION, SOLUTION INTRAMUSCULAR; INTRAVENOUS
Status: DISPENSED
Start: 2022-09-29

## (undated) RX ORDER — MORPHINE SULFATE 1 MG/ML
INJECTION, SOLUTION EPIDURAL; INTRATHECAL; INTRAVENOUS
Status: DISPENSED
Start: 2022-09-29